# Patient Record
Sex: FEMALE | Race: WHITE | Employment: OTHER | URBAN - METROPOLITAN AREA
[De-identification: names, ages, dates, MRNs, and addresses within clinical notes are randomized per-mention and may not be internally consistent; named-entity substitution may affect disease eponyms.]

---

## 2017-02-15 ENCOUNTER — OFFICE VISIT (OUTPATIENT)
Dept: FAMILY MEDICINE CLINIC | Age: 72
End: 2017-02-15

## 2017-02-15 ENCOUNTER — TELEPHONE (OUTPATIENT)
Dept: FAMILY MEDICINE CLINIC | Age: 72
End: 2017-02-15

## 2017-02-15 VITALS
DIASTOLIC BLOOD PRESSURE: 64 MMHG | WEIGHT: 177.4 LBS | TEMPERATURE: 98.9 F | BODY MASS INDEX: 34.65 KG/M2 | SYSTOLIC BLOOD PRESSURE: 102 MMHG | HEART RATE: 88 BPM | RESPIRATION RATE: 12 BRPM

## 2017-02-15 DIAGNOSIS — I34.1 MVP (MITRAL VALVE PROLAPSE): ICD-10-CM

## 2017-02-15 DIAGNOSIS — I49.9 IRREGULAR HEART RATE: Primary | ICD-10-CM

## 2017-02-15 PROCEDURE — 93000 ELECTROCARDIOGRAM COMPLETE: CPT | Performed by: FAMILY MEDICINE

## 2017-02-15 PROCEDURE — 99214 OFFICE O/P EST MOD 30 MIN: CPT | Performed by: FAMILY MEDICINE

## 2017-03-18 DIAGNOSIS — E78.00 PURE HYPERCHOLESTEROLEMIA: ICD-10-CM

## 2017-03-19 RX ORDER — ATORVASTATIN CALCIUM 20 MG/1
TABLET, FILM COATED ORAL
Qty: 30 TABLET | Refills: 0 | Status: SHIPPED | OUTPATIENT
Start: 2017-03-19 | End: 2017-04-24 | Stop reason: SDUPTHER

## 2017-04-14 ENCOUNTER — OFFICE VISIT (OUTPATIENT)
Dept: FAMILY MEDICINE CLINIC | Age: 72
End: 2017-04-14

## 2017-04-14 VITALS
HEIGHT: 60 IN | OXYGEN SATURATION: 98 % | HEART RATE: 80 BPM | DIASTOLIC BLOOD PRESSURE: 70 MMHG | BODY MASS INDEX: 34.51 KG/M2 | SYSTOLIC BLOOD PRESSURE: 115 MMHG | WEIGHT: 175.8 LBS

## 2017-04-14 DIAGNOSIS — J11.1 INFLUENZA: ICD-10-CM

## 2017-04-14 DIAGNOSIS — E23.0 HYPOPITUITARISM (HCC): Primary | ICD-10-CM

## 2017-04-14 DIAGNOSIS — G25.0 BENIGN ESSENTIAL TREMOR: ICD-10-CM

## 2017-04-14 PROCEDURE — 99214 OFFICE O/P EST MOD 30 MIN: CPT | Performed by: FAMILY MEDICINE

## 2017-04-14 RX ORDER — PROPRANOLOL HCL 60 MG
60 CAPSULE, EXTENDED RELEASE 24HR ORAL DAILY
Qty: 30 CAPSULE | Refills: 5 | Status: SHIPPED | OUTPATIENT
Start: 2017-04-14 | End: 2018-04-16

## 2017-04-14 RX ORDER — LEVOTHYROXINE SODIUM 137 UG/1
125 TABLET ORAL
COMMUNITY
End: 2017-05-05

## 2017-04-14 RX ORDER — ONDANSETRON 4 MG/1
4-8 TABLET, ORALLY DISINTEGRATING ORAL EVERY 8 HOURS PRN
Qty: 20 TABLET | Refills: 2 | Status: SHIPPED | OUTPATIENT
Start: 2017-04-14 | End: 2018-04-09 | Stop reason: SDUPTHER

## 2017-04-14 RX ORDER — BENZONATATE 100 MG/1
100 CAPSULE ORAL
COMMUNITY
Start: 2017-04-10 | End: 2018-04-09

## 2017-04-24 DIAGNOSIS — E78.00 PURE HYPERCHOLESTEROLEMIA: ICD-10-CM

## 2017-04-24 RX ORDER — ATORVASTATIN CALCIUM 20 MG/1
TABLET, FILM COATED ORAL
Qty: 30 TABLET | Refills: 0 | Status: SHIPPED | OUTPATIENT
Start: 2017-04-24 | End: 2017-05-23 | Stop reason: SDUPTHER

## 2017-05-05 ENCOUNTER — HOSPITAL ENCOUNTER (OUTPATIENT)
Dept: OTHER | Age: 72
Discharge: HOME | End: 2017-05-05
Attending: FAMILY MEDICINE | Admitting: FAMILY MEDICINE

## 2017-05-05 ENCOUNTER — OFFICE VISIT (OUTPATIENT)
Dept: FAMILY MEDICINE CLINIC | Age: 72
End: 2017-05-05

## 2017-05-05 VITALS
OXYGEN SATURATION: 98 % | DIASTOLIC BLOOD PRESSURE: 60 MMHG | HEART RATE: 81 BPM | SYSTOLIC BLOOD PRESSURE: 97 MMHG | WEIGHT: 177 LBS | RESPIRATION RATE: 12 BRPM | TEMPERATURE: 98.6 F | BODY MASS INDEX: 34.57 KG/M2

## 2017-05-05 DIAGNOSIS — R09.1 PLEURISY: Primary | ICD-10-CM

## 2017-05-05 DIAGNOSIS — R07.81 RIB PAIN ON RIGHT SIDE: ICD-10-CM

## 2017-05-05 DIAGNOSIS — R09.1 PLEURISY: ICD-10-CM

## 2017-05-05 PROCEDURE — 99213 OFFICE O/P EST LOW 20 MIN: CPT | Performed by: FAMILY MEDICINE

## 2017-05-05 RX ORDER — TRAMADOL HYDROCHLORIDE 50 MG/1
50 TABLET ORAL EVERY 6 HOURS PRN
Qty: 30 TABLET | Refills: 1 | Status: SHIPPED | OUTPATIENT
Start: 2017-05-05 | End: 2018-04-16

## 2017-05-05 RX ORDER — OSELTAMIVIR PHOSPHATE 30 MG/1
CAPSULE ORAL
COMMUNITY
Start: 2017-04-10 | End: 2017-05-05

## 2017-05-05 RX ORDER — LEVOTHYROXINE SODIUM 0.12 MG/1
TABLET ORAL
COMMUNITY
Start: 2017-03-29 | End: 2018-09-06 | Stop reason: ALTCHOICE

## 2017-05-23 DIAGNOSIS — E78.00 PURE HYPERCHOLESTEROLEMIA: ICD-10-CM

## 2017-05-26 RX ORDER — ATORVASTATIN CALCIUM 20 MG/1
TABLET, FILM COATED ORAL
Qty: 30 TABLET | Refills: 0 | Status: SHIPPED | OUTPATIENT
Start: 2017-05-26 | End: 2017-06-27 | Stop reason: SDUPTHER

## 2017-06-27 DIAGNOSIS — E78.00 PURE HYPERCHOLESTEROLEMIA: ICD-10-CM

## 2017-06-27 RX ORDER — ATORVASTATIN CALCIUM 20 MG/1
TABLET, FILM COATED ORAL
Qty: 30 TABLET | Refills: 5 | Status: SHIPPED | OUTPATIENT
Start: 2017-06-27 | End: 2018-02-28 | Stop reason: SDUPTHER

## 2018-04-09 ENCOUNTER — OFFICE VISIT (OUTPATIENT)
Dept: FAMILY MEDICINE CLINIC | Age: 73
End: 2018-04-09

## 2018-04-09 VITALS
RESPIRATION RATE: 16 BRPM | TEMPERATURE: 96.6 F | WEIGHT: 172.4 LBS | BODY MASS INDEX: 33.67 KG/M2 | HEART RATE: 77 BPM | OXYGEN SATURATION: 99 % | SYSTOLIC BLOOD PRESSURE: 120 MMHG | DIASTOLIC BLOOD PRESSURE: 84 MMHG

## 2018-04-09 DIAGNOSIS — E78.00 PURE HYPERCHOLESTEROLEMIA: ICD-10-CM

## 2018-04-09 DIAGNOSIS — E23.0 PANHYPOPITUITARISM (HCC): ICD-10-CM

## 2018-04-09 DIAGNOSIS — J06.9 VIRAL URI: ICD-10-CM

## 2018-04-09 DIAGNOSIS — R73.9 HYPERGLYCEMIA: ICD-10-CM

## 2018-04-09 DIAGNOSIS — L20.82 FLEXURAL ECZEMA: ICD-10-CM

## 2018-04-09 DIAGNOSIS — E03.9 ACQUIRED HYPOTHYROIDISM: ICD-10-CM

## 2018-04-09 DIAGNOSIS — E55.9 VITAMIN D DEFICIENCY: ICD-10-CM

## 2018-04-09 DIAGNOSIS — Z23 NEED FOR SHINGLES VACCINE: ICD-10-CM

## 2018-04-09 DIAGNOSIS — R42 LIGHTHEADEDNESS: Primary | ICD-10-CM

## 2018-04-09 DIAGNOSIS — Z12.31 ENCOUNTER FOR SCREENING MAMMOGRAM FOR BREAST CANCER: ICD-10-CM

## 2018-04-09 DIAGNOSIS — Z11.59 ENCOUNTER FOR HEPATITIS C SCREENING TEST FOR LOW RISK PATIENT: ICD-10-CM

## 2018-04-09 DIAGNOSIS — E27.49 GLUCOCORTICOID DEFICIENCY (HCC): ICD-10-CM

## 2018-04-09 PROCEDURE — 99214 OFFICE O/P EST MOD 30 MIN: CPT | Performed by: FAMILY MEDICINE

## 2018-04-09 PROCEDURE — 93000 ELECTROCARDIOGRAM COMPLETE: CPT | Performed by: FAMILY MEDICINE

## 2018-04-09 RX ORDER — ONDANSETRON 4 MG/1
4-8 TABLET, ORALLY DISINTEGRATING ORAL EVERY 8 HOURS PRN
Qty: 20 TABLET | Refills: 2 | Status: SHIPPED | OUTPATIENT
Start: 2018-04-09 | End: 2019-04-09

## 2018-04-09 RX ORDER — TRIAMCINOLONE ACETONIDE 1 MG/ML
LOTION TOPICAL
Qty: 60 ML | Refills: 1 | Status: SHIPPED | OUTPATIENT
Start: 2018-04-09 | End: 2018-04-16

## 2018-04-09 RX ORDER — ATORVASTATIN CALCIUM 20 MG/1
TABLET, FILM COATED ORAL
Qty: 30 TABLET | Refills: 5 | Status: SHIPPED | OUTPATIENT
Start: 2018-04-09 | End: 2018-11-06 | Stop reason: SDUPTHER

## 2018-04-09 ASSESSMENT — ENCOUNTER SYMPTOMS
SHORTNESS OF BREATH: 0
SORE THROAT: 1
WHEEZING: 0
ABDOMINAL PAIN: 0
CHEST TIGHTNESS: 0
SINUS PRESSURE: 0
RHINORRHEA: 1
SINUS PAIN: 0

## 2018-04-10 DIAGNOSIS — D50.0 IRON DEFICIENCY ANEMIA DUE TO CHRONIC BLOOD LOSS: Primary | ICD-10-CM

## 2018-04-10 DIAGNOSIS — D64.9 ANEMIA, UNSPECIFIED TYPE: ICD-10-CM

## 2018-04-10 DIAGNOSIS — D64.9 ANEMIA, UNSPECIFIED TYPE: Primary | ICD-10-CM

## 2018-04-10 LAB
A/G RATIO: 1.5 (ref 1.1–2.2)
ALBUMIN SERPL-MCNC: 4 G/DL (ref 3.4–5)
ALP BLD-CCNC: 96 U/L (ref 40–129)
ALT SERPL-CCNC: 9 U/L (ref 10–40)
ANION GAP SERPL CALCULATED.3IONS-SCNC: 14 MMOL/L (ref 3–16)
AST SERPL-CCNC: 11 U/L (ref 15–37)
BASOPHILS ABSOLUTE: 0.1 K/UL (ref 0–0.2)
BASOPHILS RELATIVE PERCENT: 0.7 %
BILIRUB SERPL-MCNC: 0.3 MG/DL (ref 0–1)
BUN BLDV-MCNC: 13 MG/DL (ref 7–20)
CALCIUM SERPL-MCNC: 9 MG/DL (ref 8.3–10.6)
CHLORIDE BLD-SCNC: 100 MMOL/L (ref 99–110)
CHOLESTEROL, TOTAL: 154 MG/DL (ref 0–199)
CO2: 24 MMOL/L (ref 21–32)
CREAT SERPL-MCNC: 0.9 MG/DL (ref 0.6–1.2)
EOSINOPHILS ABSOLUTE: 0.2 K/UL (ref 0–0.6)
EOSINOPHILS RELATIVE PERCENT: 1.6 %
FERRITIN: 9.1 NG/ML (ref 15–150)
GFR AFRICAN AMERICAN: >60
GFR NON-AFRICAN AMERICAN: >60
GLOBULIN: 2.7 G/DL
GLUCOSE BLD-MCNC: 123 MG/DL (ref 70–99)
HCT VFR BLD CALC: 30.7 % (ref 36–48)
HCT VFR BLD CALC: 33.4 % (ref 36–48)
HDLC SERPL-MCNC: 58 MG/DL (ref 40–60)
HEMOGLOBIN: 9.9 G/DL (ref 12–16)
HEPATITIS C ANTIBODY INTERPRETATION: NORMAL
IMMATURE RETIC FRACT: 0.46 (ref 0.21–0.37)
IRON SATURATION: 6 % (ref 15–50)
IRON: 25 UG/DL (ref 37–145)
LDL CHOLESTEROL CALCULATED: 61 MG/DL
LYMPHOCYTES ABSOLUTE: 2.2 K/UL (ref 1–5.1)
LYMPHOCYTES RELATIVE PERCENT: 18.3 %
MCH RBC QN AUTO: 23.5 PG (ref 26–34)
MCHC RBC AUTO-ENTMCNC: 32.2 G/DL (ref 31–36)
MCV RBC AUTO: 72.9 FL (ref 80–100)
MONOCYTES ABSOLUTE: 0.8 K/UL (ref 0–1.3)
MONOCYTES RELATIVE PERCENT: 6.8 %
NEUTROPHILS ABSOLUTE: 8.8 K/UL (ref 1.7–7.7)
NEUTROPHILS RELATIVE PERCENT: 72.6 %
PDW BLD-RTO: 19.1 % (ref 12.4–15.4)
PLATELET # BLD: 326 K/UL (ref 135–450)
PMV BLD AUTO: 9.4 FL (ref 5–10.5)
POTASSIUM SERPL-SCNC: 4 MMOL/L (ref 3.5–5.1)
PROLACTIN: 2 NG/ML
RBC # BLD: 4.21 M/UL (ref 4–5.2)
RETICULOCYTE ABSOLUTE COUNT: 0.09 M/UL (ref 0.02–0.1)
RETICULOCYTE COUNT PCT: 1.94 % (ref 0.5–2.18)
SODIUM BLD-SCNC: 138 MMOL/L (ref 136–145)
T4 FREE: 2.2 NG/DL (ref 0.9–1.8)
TOTAL IRON BINDING CAPACITY: 386 UG/DL (ref 260–445)
TOTAL PROTEIN: 6.7 G/DL (ref 6.4–8.2)
TRIGL SERPL-MCNC: 175 MG/DL (ref 0–150)
TSH REFLEX: <0.01 UIU/ML (ref 0.27–4.2)
VITAMIN B-12: 562 PG/ML (ref 211–911)
VITAMIN D 25-HYDROXY: 31.2 NG/ML
VLDLC SERPL CALC-MCNC: 35 MG/DL
WBC # BLD: 12.1 K/UL (ref 4–11)

## 2018-04-10 RX ORDER — LANOLIN ALCOHOL/MO/W.PET/CERES
325 CREAM (GRAM) TOPICAL 2 TIMES DAILY
Qty: 90 TABLET | Refills: 3 | COMMUNITY
Start: 2018-04-10 | End: 2019-07-10

## 2018-04-11 ENCOUNTER — TELEPHONE (OUTPATIENT)
Dept: FAMILY MEDICINE CLINIC | Age: 73
End: 2018-04-11

## 2018-04-11 LAB
ESTIMATED AVERAGE GLUCOSE: 119.8 MG/DL
HBA1C MFR BLD: 5.8 %

## 2018-04-12 LAB — GROWTH HORMONE: 1.28 NG/ML (ref 0.05–8)

## 2018-04-13 LAB — ADRENOCORTICOTROPIC HORMONE: <5 PG/ML (ref 6–58)

## 2018-04-16 ENCOUNTER — HOSPITAL ENCOUNTER (OUTPATIENT)
Dept: MAMMOGRAPHY | Age: 73
Discharge: HOME | End: 2018-04-16
Attending: FAMILY MEDICINE | Admitting: FAMILY MEDICINE

## 2018-04-16 DIAGNOSIS — Z12.31 ENCOUNTER FOR SCREENING MAMMOGRAM FOR BREAST CANCER: ICD-10-CM

## 2018-04-19 LAB — CORTISOL FREE, SERUM: <0.05 UG/DL

## 2018-04-27 ENCOUNTER — HOSPITAL ENCOUNTER (OUTPATIENT)
Dept: ENDOSCOPY | Age: 73
Discharge: HOME | End: 2018-04-27
Attending: INTERNAL MEDICINE | Admitting: INTERNAL MEDICINE

## 2018-04-27 RX ORDER — LIDOCAINE HYDROCHLORIDE 10 MG/ML
1 INJECTION, SOLUTION EPIDURAL; INFILTRATION; INTRACAUDAL; PERINEURAL
Status: DISCONTINUED | OUTPATIENT
Start: 2018-04-27 | End: 2018-04-27

## 2018-04-27 RX ORDER — SODIUM CHLORIDE 9 MG/ML
INJECTION, SOLUTION INTRAVENOUS CONTINUOUS
Status: CANCELLED | OUTPATIENT
Start: 2018-04-27

## 2018-04-27 RX ORDER — SODIUM CHLORIDE 0.9 % (FLUSH) 0.9 %
10 SYRINGE (ML) INJECTION EVERY 12 HOURS SCHEDULED
Status: CANCELLED | OUTPATIENT
Start: 2018-04-27

## 2018-04-27 RX ORDER — SODIUM CHLORIDE 0.9 % (FLUSH) 0.9 %
10 SYRINGE (ML) INJECTION EVERY 12 HOURS SCHEDULED
Status: DISCONTINUED | OUTPATIENT
Start: 2018-04-27 | End: 2018-04-28 | Stop reason: HOSPADM

## 2018-04-27 RX ORDER — LIDOCAINE HYDROCHLORIDE 10 MG/ML
1 INJECTION, SOLUTION EPIDURAL; INFILTRATION; INTRACAUDAL; PERINEURAL
Status: CANCELLED | OUTPATIENT
Start: 2018-04-27 | End: 2018-04-27

## 2018-04-27 RX ORDER — SODIUM CHLORIDE 0.9 % (FLUSH) 0.9 %
10 SYRINGE (ML) INJECTION PRN
Status: DISCONTINUED | OUTPATIENT
Start: 2018-04-27 | End: 2018-04-28 | Stop reason: HOSPADM

## 2018-04-27 RX ORDER — SODIUM CHLORIDE 0.9 % (FLUSH) 0.9 %
10 SYRINGE (ML) INJECTION PRN
Status: CANCELLED | OUTPATIENT
Start: 2018-04-27

## 2018-04-27 RX ORDER — SODIUM CHLORIDE 9 MG/ML
INJECTION, SOLUTION INTRAVENOUS CONTINUOUS
Status: DISCONTINUED | OUTPATIENT
Start: 2018-04-27 | End: 2018-04-28 | Stop reason: HOSPADM

## 2018-04-27 RX ORDER — ONDANSETRON 2 MG/ML
4 INJECTION INTRAMUSCULAR; INTRAVENOUS
Status: ACTIVE | OUTPATIENT
Start: 2018-04-27 | End: 2018-04-27

## 2018-05-09 PROBLEM — Z11.59 ENCOUNTER FOR HEPATITIS C SCREENING TEST FOR LOW RISK PATIENT: Status: RESOLVED | Noted: 2018-04-09 | Resolved: 2018-05-09

## 2018-05-09 PROBLEM — Z12.31 ENCOUNTER FOR SCREENING MAMMOGRAM FOR BREAST CANCER: Status: RESOLVED | Noted: 2018-04-09 | Resolved: 2018-05-09

## 2018-07-13 ENCOUNTER — TELEPHONE (OUTPATIENT)
Dept: FAMILY MEDICINE CLINIC | Age: 73
End: 2018-07-13

## 2018-07-13 DIAGNOSIS — D64.9 ANEMIA, UNSPECIFIED TYPE: Primary | ICD-10-CM

## 2018-07-18 DIAGNOSIS — D64.9 ANEMIA, UNSPECIFIED TYPE: ICD-10-CM

## 2018-07-18 LAB
FERRITIN: 32.1 NG/ML (ref 15–150)
HCT VFR BLD CALC: 42.7 % (ref 36–48)
HEMOGLOBIN: 14.4 G/DL (ref 12–16)
IRON SATURATION: 26 % (ref 15–50)
IRON: 79 UG/DL (ref 37–145)
MCH RBC QN AUTO: 30.2 PG (ref 26–34)
MCHC RBC AUTO-ENTMCNC: 33.8 G/DL (ref 31–36)
MCV RBC AUTO: 89.4 FL (ref 80–100)
PDW BLD-RTO: 17.3 % (ref 12.4–15.4)
PLATELET # BLD: 228 K/UL (ref 135–450)
PMV BLD AUTO: 9.8 FL (ref 5–10.5)
RBC # BLD: 4.77 M/UL (ref 4–5.2)
TOTAL IRON BINDING CAPACITY: 301 UG/DL (ref 260–445)
WBC # BLD: 10.5 K/UL (ref 4–11)

## 2018-09-06 ENCOUNTER — OFFICE VISIT (OUTPATIENT)
Dept: FAMILY MEDICINE CLINIC | Age: 73
End: 2018-09-06

## 2018-09-06 VITALS
OXYGEN SATURATION: 98 % | DIASTOLIC BLOOD PRESSURE: 81 MMHG | SYSTOLIC BLOOD PRESSURE: 134 MMHG | TEMPERATURE: 97.8 F | BODY MASS INDEX: 33.32 KG/M2 | RESPIRATION RATE: 12 BRPM | HEART RATE: 69 BPM | WEIGHT: 170.6 LBS

## 2018-09-06 DIAGNOSIS — R23.8 SKIN IRRITATION: Primary | ICD-10-CM
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
## 2018-09-06 DIAGNOSIS — B35.9 TINEA: ICD-10-CM

## 2018-09-06 DIAGNOSIS — Z23 NEED FOR INFLUENZA VACCINATION: ICD-10-CM

## 2018-09-06 PROCEDURE — 90662 IIV NO PRSV INCREASED AG IM: CPT | Performed by: FAMILY MEDICINE

## 2018-09-06 PROCEDURE — G0008 ADMIN INFLUENZA VIRUS VAC: HCPCS | Performed by: FAMILY MEDICINE

## 2018-09-06 PROCEDURE — 99213 OFFICE O/P EST LOW 20 MIN: CPT | Performed by: FAMILY MEDICINE

## 2018-09-06 RX ORDER — LEVOTHYROXINE SODIUM 0.1 MG/1
TABLET ORAL
COMMUNITY
Start: 2018-08-29 | End: 2021-12-15 | Stop reason: SDUPTHER

## 2018-09-06 RX ORDER — TRIAMCINOLONE ACETONIDE 1 MG/G
CREAM TOPICAL
Qty: 28.4 G | Refills: 0 | Status: SHIPPED | OUTPATIENT
Start: 2018-09-06 | End: 2019-07-10

## 2018-09-06 RX ORDER — NYSTATIN 100000 [USP'U]/G
POWDER TOPICAL
Qty: 56.7 G | Refills: 2 | Status: SHIPPED | OUTPATIENT
Start: 2018-09-06 | End: 2020-08-03 | Stop reason: ALTCHOICE

## 2018-09-06 ASSESSMENT — PATIENT HEALTH QUESTIONNAIRE - PHQ9
SUM OF ALL RESPONSES TO PHQ QUESTIONS 1-9: 0
SUM OF ALL RESPONSES TO PHQ QUESTIONS 1-9: 0
2. FEELING DOWN, DEPRESSED OR HOPELESS: 0
1. LITTLE INTEREST OR PLEASURE IN DOING THINGS: 0
SUM OF ALL RESPONSES TO PHQ9 QUESTIONS 1 & 2: 0

## 2018-09-06 NOTE — PROGRESS NOTES
Vaccine Information Sheet, \"Influenza - Inactivated\"  given to Sekou Mallory, or parent/legal guardian of  Sekou Mallory and verbalized understanding. Patient responses:    Have you ever had a reaction to a flu vaccine? No  Are you able to eat eggs without adverse effects? Yes  Do you have any current illness? No  Have you ever had Guillian Upton Syndrome? No    Flu vaccine given per order. Please see immunization tab. Current Influenza vaccine VIS given to patient. Influenza consent form/questionnaire completed and signed. Patient responses to  Influenza consent form / questionnaire  reviewed. Vaccine given per protocol.
Restless legs syndrome (RLS)     Wears glasses        Past Surgical History:   Procedure Laterality Date    CARPAL TUNNEL RELEASE Bilateral     CHOLECYSTECTOMY  05/13/2013    DIAGNOSTIC LAPAROSCOPY,LIGATION OF RIGHT ROUND    JOINT REPLACEMENT Bilateral     2006 & 2007    PITUITARY EXCISION  1990 5/6 removed - Dr Abby Juan        Family History   Problem Relation Age of Onset    No Known Problems Mother     No Known Problems Father     No Known Problems Maternal Grandmother     No Known Problems Maternal Grandfather     No Known Problems Paternal Grandmother     No Known Problems Paternal Grandfather     No Known Problems Sister     No Known Problems Brother     No Known Problems Paternal Aunt     No Known Problems Paternal Uncle     No Known Problems Maternal Aunt     No Known Problems Maternal Uncle     No Known Problems Other     Anesth Problems Neg Hx     Malig Hyperten Neg Hx     Hypotension Neg Hx     Malig Hypertherm Neg Hx     Pseudochol. Deficiency Neg Hx        Social History   Substance Use Topics    Smoking status: Never Smoker    Smokeless tobacco: Never Used    Alcohol use No            Review of Systems  Review of Systems    Objective:   Physical Exam  Vitals:    09/06/18 1003   BP: 134/81   Pulse: 69   Resp: 12   Temp: 97.8 °F (36.6 °C)   SpO2: 98%   Weight: 170 lb 9.6 oz (77.4 kg)       Physical Exam   Constitutional: She is oriented to person, place, and time. She appears well-developed and well-nourished. No distress. Cardiovascular: Normal rate, regular rhythm and normal heart sounds. Pulmonary/Chest: Effort normal and breath sounds normal.   Neurological: She is alert and oriented to person, place, and time. Skin: Skin is warm and dry. Assessment:       Diagnosis Orders   1. Skin irritation  triamcinolone (KENALOG) 0.1 % cream   2. Insect bite, initial encounter  triamcinolone (KENALOG) 0.1 % cream   3.  Tinea  nystatin (MYCOSTATIN) 531982 UNIT/GM

## 2019-02-11 ENCOUNTER — HOSPITAL ENCOUNTER (OUTPATIENT)
Age: 74
Discharge: HOME | End: 2019-02-11
Payer: MEDICARE

## 2019-02-11 ENCOUNTER — OFFICE VISIT (OUTPATIENT)
Dept: FAMILY MEDICINE CLINIC | Age: 74
End: 2019-02-11
Payer: MEDICARE

## 2019-02-11 ENCOUNTER — HOSPITAL ENCOUNTER (OUTPATIENT)
Dept: GENERAL RADIOLOGY | Age: 74
Discharge: HOME | End: 2019-02-11
Payer: MEDICARE

## 2019-02-11 VITALS
SYSTOLIC BLOOD PRESSURE: 132 MMHG | WEIGHT: 164.4 LBS | TEMPERATURE: 98.1 F | HEIGHT: 60 IN | HEART RATE: 74 BPM | OXYGEN SATURATION: 98 % | DIASTOLIC BLOOD PRESSURE: 82 MMHG | BODY MASS INDEX: 32.28 KG/M2

## 2019-02-11 DIAGNOSIS — M79.644 FINGER PAIN, RIGHT: ICD-10-CM

## 2019-02-11 DIAGNOSIS — E23.0 HYPOPITUITARISM (HCC): ICD-10-CM

## 2019-02-11 DIAGNOSIS — M79.644 FINGER PAIN, RIGHT: Primary | ICD-10-CM

## 2019-02-11 PROCEDURE — 99213 OFFICE O/P EST LOW 20 MIN: CPT | Performed by: FAMILY MEDICINE

## 2019-02-11 PROCEDURE — 73140 X-RAY EXAM OF FINGER(S): CPT

## 2019-02-19 ENCOUNTER — TELEPHONE (OUTPATIENT)
Dept: FAMILY MEDICINE CLINIC | Age: 74
End: 2019-02-19

## 2019-02-19 ENCOUNTER — OFFICE VISIT (OUTPATIENT)
Dept: FAMILY MEDICINE CLINIC | Age: 74
End: 2019-02-19
Payer: MEDICARE

## 2019-02-19 VITALS
SYSTOLIC BLOOD PRESSURE: 136 MMHG | RESPIRATION RATE: 12 BRPM | WEIGHT: 162.8 LBS | OXYGEN SATURATION: 98 % | HEART RATE: 77 BPM | DIASTOLIC BLOOD PRESSURE: 78 MMHG | BODY MASS INDEX: 31.79 KG/M2

## 2019-02-19 DIAGNOSIS — S60.041A CONTUSION OF RIGHT RING FINGER WITHOUT DAMAGE TO NAIL, INITIAL ENCOUNTER: ICD-10-CM

## 2019-02-19 DIAGNOSIS — M81.0 AGE-RELATED OSTEOPOROSIS WITHOUT CURRENT PATHOLOGICAL FRACTURE: ICD-10-CM

## 2019-02-19 DIAGNOSIS — F32.9 REACTIVE DEPRESSION (SITUATIONAL): ICD-10-CM

## 2019-02-19 DIAGNOSIS — D50.0 IRON DEFICIENCY ANEMIA DUE TO CHRONIC BLOOD LOSS: ICD-10-CM

## 2019-02-19 DIAGNOSIS — E23.0 PANHYPOPITUITARISM (HCC): ICD-10-CM

## 2019-02-19 DIAGNOSIS — M19.041 OSTEOARTHRITIS OF FINGER OF RIGHT HAND: Primary | ICD-10-CM

## 2019-02-19 PROCEDURE — 99214 OFFICE O/P EST MOD 30 MIN: CPT | Performed by: FAMILY MEDICINE

## 2019-02-19 RX ORDER — ZOLEDRONIC ACID 5 MG/100ML
5 INJECTION, SOLUTION INTRAVENOUS ONCE
Qty: 100 ML | Refills: 0 | Status: SHIPPED | OUTPATIENT
Start: 2019-02-19 | End: 2019-03-25 | Stop reason: SDUPTHER

## 2019-02-19 RX ORDER — OMEPRAZOLE 40 MG/1
40 CAPSULE, DELAYED RELEASE ORAL DAILY
Qty: 90 CAPSULE | Refills: 3 | Status: SHIPPED | OUTPATIENT
Start: 2019-02-19 | End: 2020-02-13

## 2019-02-19 ASSESSMENT — PATIENT HEALTH QUESTIONNAIRE - PHQ9
SUM OF ALL RESPONSES TO PHQ QUESTIONS 1-9: 0
SUM OF ALL RESPONSES TO PHQ QUESTIONS 1-9: 0
2. FEELING DOWN, DEPRESSED OR HOPELESS: 0
SUM OF ALL RESPONSES TO PHQ9 QUESTIONS 1 & 2: 0
1. LITTLE INTEREST OR PLEASURE IN DOING THINGS: 0

## 2019-02-19 ASSESSMENT — ENCOUNTER SYMPTOMS
SHORTNESS OF BREATH: 0
COUGH: 0

## 2019-03-25 ENCOUNTER — TELEPHONE (OUTPATIENT)
Dept: FAMILY MEDICINE CLINIC | Age: 74
End: 2019-03-25

## 2019-03-25 DIAGNOSIS — M81.0 AGE-RELATED OSTEOPOROSIS WITHOUT CURRENT PATHOLOGICAL FRACTURE: ICD-10-CM

## 2019-03-25 RX ORDER — ZOLEDRONIC ACID 5 MG/100ML
5 INJECTION, SOLUTION INTRAVENOUS ONCE
Qty: 100 ML | Refills: 0 | Status: SHIPPED | OUTPATIENT
Start: 2019-03-25 | End: 2020-08-10

## 2019-03-28 ENCOUNTER — HOSPITAL ENCOUNTER (OUTPATIENT)
Dept: ONCOLOGY | Age: 74
Setting detail: INFUSION SERIES
Discharge: HOME | End: 2019-03-28
Payer: MEDICARE

## 2019-03-28 VITALS
DIASTOLIC BLOOD PRESSURE: 73 MMHG | HEART RATE: 74 BPM | TEMPERATURE: 98.4 F | RESPIRATION RATE: 16 BRPM | SYSTOLIC BLOOD PRESSURE: 107 MMHG

## 2019-03-28 PROCEDURE — 96365 THER/PROPH/DIAG IV INF INIT: CPT

## 2019-03-28 PROCEDURE — 6360000002 HC RX W HCPCS: Performed by: FAMILY MEDICINE

## 2019-03-28 PROCEDURE — 99201 HC NEW PT, OUTPT VISIT LEVEL 1: CPT

## 2019-03-28 RX ORDER — ZOLEDRONIC ACID 5 MG/100ML
5 INJECTION, SOLUTION INTRAVENOUS ONCE
Status: COMPLETED | OUTPATIENT
Start: 2019-03-28 | End: 2019-03-28

## 2019-03-28 RX ADMIN — ZOLEDRONIC ACID 5 MG: 5 INJECTION, SOLUTION INTRAVENOUS at 13:08

## 2019-03-28 NOTE — PLAN OF CARE
Problem: KNOWLEDGE DEFICIT  Goal: Patient/S.O. demonstrates understanding of disease process, treatment plan, medications, and discharge instructions. Note:   Pt seen and assessed at [address] [facility] for Reclast infusion per orders from Dr. Nata Wolfe. Infused per [facility] policy. Monitoring completed for infusion reactions - see flowsheet. Pt tolerated infusion well and without incident. Pt verbalizes understanding of discharge instructions. Discharged ambulatory to home with self.

## 2019-03-28 NOTE — PROGRESS NOTES
Patient arrived ambulatory to Outpatient infusion for Reclast.  Alert, oriented, vss.  Denies complaints.

## 2019-04-29 ENCOUNTER — HOSPITAL ENCOUNTER (OUTPATIENT)
Dept: MAMMOGRAPHY | Age: 74
Discharge: HOME | End: 2019-04-29
Payer: MEDICARE

## 2019-04-29 DIAGNOSIS — Z12.31 VISIT FOR SCREENING MAMMOGRAM: ICD-10-CM

## 2019-04-29 PROCEDURE — 77063 BREAST TOMOSYNTHESIS BI: CPT

## 2019-07-01 LAB
AVERAGE GLUCOSE: 111
HBA1C MFR BLD: 5.5 %

## 2019-07-10 ENCOUNTER — OFFICE VISIT (OUTPATIENT)
Dept: FAMILY MEDICINE CLINIC | Age: 74
End: 2019-07-10
Payer: MEDICARE

## 2019-07-10 VITALS
RESPIRATION RATE: 12 BRPM | BODY MASS INDEX: 30.82 KG/M2 | OXYGEN SATURATION: 96 % | WEIGHT: 157 LBS | SYSTOLIC BLOOD PRESSURE: 98 MMHG | TEMPERATURE: 97.7 F | HEIGHT: 60 IN | HEART RATE: 69 BPM | DIASTOLIC BLOOD PRESSURE: 67 MMHG

## 2019-07-10 DIAGNOSIS — D72.829 LEUKOCYTOSIS, UNSPECIFIED TYPE: Primary | ICD-10-CM

## 2019-07-10 DIAGNOSIS — D72.829 LEUKOCYTOSIS, UNSPECIFIED TYPE: ICD-10-CM

## 2019-07-10 LAB
BASOPHILS ABSOLUTE: 0 K/UL (ref 0–0.2)
BASOPHILS RELATIVE PERCENT: 0.4 %
EOSINOPHILS ABSOLUTE: 0.1 K/UL (ref 0–0.6)
EOSINOPHILS RELATIVE PERCENT: 0.5 %
HCT VFR BLD CALC: 45.2 % (ref 36–48)
HEMOGLOBIN: 14.7 G/DL (ref 12–16)
LYMPHOCYTES ABSOLUTE: 1.4 K/UL (ref 1–5.1)
LYMPHOCYTES RELATIVE PERCENT: 13.6 %
MCH RBC QN AUTO: 30.3 PG (ref 26–34)
MCHC RBC AUTO-ENTMCNC: 32.6 G/DL (ref 31–36)
MCV RBC AUTO: 93.1 FL (ref 80–100)
MONOCYTES ABSOLUTE: 0.5 K/UL (ref 0–1.3)
MONOCYTES RELATIVE PERCENT: 5.1 %
NEUTROPHILS ABSOLUTE: 8.1 K/UL (ref 1.7–7.7)
NEUTROPHILS RELATIVE PERCENT: 80.4 %
PDW BLD-RTO: 14.5 % (ref 12.4–15.4)
PLATELET # BLD: 247 K/UL (ref 135–450)
PMV BLD AUTO: 10.2 FL (ref 5–10.5)
RBC # BLD: 4.86 M/UL (ref 4–5.2)
WBC # BLD: 10.1 K/UL (ref 4–11)

## 2019-07-10 PROCEDURE — 99213 OFFICE O/P EST LOW 20 MIN: CPT | Performed by: FAMILY MEDICINE

## 2019-07-10 NOTE — PROGRESS NOTES
Subjective:      Patient ID: Madeleine Alves 69 y.o. female. is here for evaluation for elevated WBC      HPI    Maggy Mckeon was referred by Dr. Kaci Coffey. Routine labs showed increased WBC. She complains of some fatigue. She denies fever, night sweats, weight loss, URI, ST, cough, congestion, dysuria. She has been doing more the past few weeks; her partner recently had TKR. She is sleeping well. Outpatient Medications Marked as Taking for the 7/10/19 encounter (Office Visit) with Mariia Garcias MD   Medication Sig Dispense Refill    atorvastatin (LIPITOR) 20 MG tablet TAKE ONE TABLET BY MOUTH DAILY 30 tablet 2    omeprazole (PRILOSEC) 40 MG delayed release capsule Take 1 capsule by mouth daily 90 capsule 3    diclofenac sodium (VOLTAREN) 1 % GEL Apply 2 g topically 4 times daily 2 Tube 0    levothyroxine (SYNTHROID) 100 MCG tablet       nystatin (MYCOSTATIN) 390948 UNIT/GM powder Apply 3 times daily.  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[address]    Glucocorticoid deficiency ([address]    Vitamin D deficiency    Osteopenia    Nausea & vomiting    Hiatal hernia    Elevated liver enzymes    Need for shingles vaccine    Anemia       Past Medical History:   Diagnosis Date    Anxiety     Depression     GERD (gastroesophageal reflux disease)     Hypothyroidism     Osteoporosis     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    Restless legs syndrome (RLS)     Wears glasses        Past Surgical History:   Procedure Laterality Date    CARPAL TUNNEL RELEASE Bilateral     CHOLECYSTECTOMY  05/13/2013    DIAGNOSTIC LAPAROSCOPY,LIGATION OF RIGHT ROUND    JOINT REPLACEMENT Bilateral     2006 & 2007    PITUITARY EXCISION  1990 5/6 removed - Dr Cristobal Tirado        Family History   Problem Relation Age of Onset    No Known Problems Mother     No Known Problems Father     No Known Problems Maternal Grandmother     No Known Problems Maternal Grandfather     No Known Problems Paternal Grandmother     No Known Problems Paternal Grandfather     No Known Problems Sister     No Known Problems Brother     No Known Problems Paternal Aunt     No Known Problems Paternal Uncle     No Known Problems Maternal Aunt     No Known Problems Maternal Uncle     No Known Problems Other     Anesth Problems Neg Hx     Malig Hyperten Neg Hx     Hypotension Neg Hx     Malig Hypertherm Neg Hx     Pseudochol. Deficiency Neg Hx        Social History     Tobacco Use    Smoking status: Never Smoker    Smokeless tobacco: Never Used   Substance Use Topics    Alcohol use: No    Drug use: No            Review of Systems  Review of Systems    Objective:   Physical Exam  Vitals:    07/10/19 1424   BP: 98/67   Pulse: 69   Resp: 12   Temp: 97.7 °F (36.5 °C)   TempSrc: Oral   SpO2: 96%   Weight: 157 lb (71.2 kg)   Height: 5' (1.524 m)       Physical Exam  NAD  Skin is warm and dry. Well hydrated, no rash. No cervical, supraclavicular or submandibular LNS. Chest is clear, no wheezing or rales. Normal symmetric air entry throughout both lung fields. Heart regular with normal rate, no murmer or gallop  The abdomen is soft without tenderness, guarding, mass, rebound or organomegaly. Bowel sounds are normal. No CVA inguinal adenopathy noted. Assessment:       Diagnosis Orders   1. Leukocytosis, unspecified type  CBC Auto Differential          Plan:      Reassured this is likely not significant.  Call or return to clinic prn if these symptoms worsen or fail to improve as

## 2019-08-19 DIAGNOSIS — E78.00 PURE HYPERCHOLESTEROLEMIA: ICD-10-CM

## 2019-08-19 RX ORDER — ATORVASTATIN CALCIUM 20 MG/1
TABLET, FILM COATED ORAL
Qty: 90 TABLET | Refills: 1 | Status: SHIPPED | OUTPATIENT
Start: 2019-08-19 | End: 2020-02-13

## 2020-02-07 ENCOUNTER — NURSE TRIAGE (OUTPATIENT)
Dept: OTHER | Facility: CLINIC | Age: 75
End: 2020-02-07

## 2020-02-13 RX ORDER — ATORVASTATIN CALCIUM 20 MG/1
TABLET, FILM COATED ORAL
Qty: 90 TABLET | Refills: 0 | Status: SHIPPED | OUTPATIENT
Start: 2020-02-13 | End: 2020-05-13

## 2020-02-13 RX ORDER — OMEPRAZOLE 40 MG/1
CAPSULE, DELAYED RELEASE ORAL
Qty: 90 CAPSULE | Refills: 0 | Status: SHIPPED | OUTPATIENT
Start: 2020-02-13 | End: 2020-05-13

## 2020-02-14 ENCOUNTER — OFFICE VISIT (OUTPATIENT)
Dept: FAMILY MEDICINE CLINIC | Age: 75
End: 2020-02-14
Payer: MEDICARE

## 2020-02-14 VITALS
WEIGHT: 165 LBS | TEMPERATURE: 97.1 F | HEIGHT: 61 IN | HEART RATE: 69 BPM | SYSTOLIC BLOOD PRESSURE: 132 MMHG | BODY MASS INDEX: 31.15 KG/M2 | DIASTOLIC BLOOD PRESSURE: 81 MMHG | OXYGEN SATURATION: 99 % | RESPIRATION RATE: 12 BRPM

## 2020-02-14 LAB
BILIRUBIN, POC: NEGATIVE
BLOOD URINE, POC: ABNORMAL
CLARITY, POC: CLEAR
COLOR, POC: CLEAR
GLUCOSE URINE, POC: NEGATIVE
KETONES, POC: NEGATIVE
LEUKOCYTE EST, POC: ABNORMAL
NITRITE, POC: NEGATIVE
PH, POC: 6
PROTEIN, POC: NEGATIVE
SPECIFIC GRAVITY, POC: 1
UROBILINOGEN, POC: 0.2

## 2020-02-14 PROCEDURE — 81002 URINALYSIS NONAUTO W/O SCOPE: CPT | Performed by: FAMILY MEDICINE

## 2020-02-14 PROCEDURE — 1111F DSCHRG MED/CURRENT MED MERGE: CPT | Performed by: FAMILY MEDICINE

## 2020-02-14 PROCEDURE — 99215 OFFICE O/P EST HI 40 MIN: CPT | Performed by: FAMILY MEDICINE

## 2020-02-14 NOTE — PROGRESS NOTES
On the basis of positive falls risk screening, assessment and plan is as follows: home safety tips provided. Post-Discharge Transitional Care Management Services or Hospital Follow Up      Ernestine Gamez   YOB: 1945    Date of Office Visit:  2/14/2020  Date of Hospital Admission: 6/12/15  Date of Hospital Discharge: 6/12/15  Readmission Risk Score(high >=14%. Medium >=10%):No data recorded    Care management risk score Rising risk (score 2-5) and Complex Care (Scores >=6): 0     Non face to face  following discharge, date last encounter closed (first attempt may have been earlier): *No documented post hospital discharge outreach found in the last 14 days *No documented post hospital discharge outreach found in the last 14 days    Call initiated 2 business days of discharge: *No response recorded in the last 14 days     Patient Active Problem List   Diagnosis    Hypopituitarism ([address]    Hyperlipemia    Osteoporosis    Reactive depression (situational)    Acromegaly and gigantism ([address]    Hypothyroidism    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[address]    Glucocorticoid deficiency ([address]    Vitamin D deficiency    Osteopenia    Nausea & vomiting    Hiatal hernia    Elevated liver enzymes    Need for shingles vaccine    Anemia       Allergies   Allergen Reactions    Sulfa Antibiotics      anaphylaxis    Codeine      vomiting    Demerol      nausea    Nsaids Dermatitis       Medications listed as ordered at the time of discharge from hospital   Nick, [facility] [place] [employer] Medication Instructions JESENIA:    Printed on:02/14/20 6105   Medication Information                      atorvastatin (LIPITOR) 20 MG tablet  TAKE ONE TABLET BY MOUTH DAILY             CALCIUM PO  Take by mouth             diclofenac sodium (VOLTAREN) 1 % GEL  Apply 2 g topically 4 times daily             levothyroxine (SYNTHROID) 100 MCG tablet               nystatin (MYCOSTATIN) 174930 UNIT/GM powder  Apply 3 times daily.

## 2020-02-17 ENCOUNTER — TELEPHONE (OUTPATIENT)
Dept: FAMILY MEDICINE CLINIC | Age: 75
End: 2020-02-17

## 2020-02-17 RX ORDER — CEFUROXIME AXETIL 500 MG/1
500 TABLET ORAL 2 TIMES DAILY
Qty: 10 TABLET | Refills: 0 | Status: SHIPPED | OUTPATIENT
Start: 2020-02-17 | End: 2020-02-22

## 2020-02-17 NOTE — TELEPHONE ENCOUNTER
Patient said she finished medication on 2-14-20 when she saw you and now symptoms of UTI have returned. She c/o a little burning and urinary urgency, upset stomach and diarrhea. Please advise if something can be called in to pharmacy. Thanks.

## 2020-02-17 NOTE — TELEPHONE ENCOUNTER
Pt said all of her symtoms are back from the UTI. She still has urgency to urinate,and upset stomach.  [facility] [address] [facility], [address] [facility] - P [phone] - f [phone]

## 2020-05-13 RX ORDER — ATORVASTATIN CALCIUM 20 MG/1
TABLET, FILM COATED ORAL
Qty: 90 TABLET | Refills: 0 | Status: SHIPPED | OUTPATIENT
Start: 2020-05-13 | End: 2020-08-10 | Stop reason: SDUPTHER

## 2020-05-13 RX ORDER — OMEPRAZOLE 40 MG/1
CAPSULE, DELAYED RELEASE ORAL
Qty: 90 CAPSULE | Refills: 0 | Status: SHIPPED | OUTPATIENT
Start: 2020-05-13 | End: 2020-08-10 | Stop reason: SDUPTHER

## 2020-07-27 ENCOUNTER — PATIENT MESSAGE (OUTPATIENT)
Dept: FAMILY MEDICINE CLINIC | Age: 75
End: 2020-07-27

## 2020-07-27 NOTE — TELEPHONE ENCOUNTER
Message from Regine Munguia was vomiting every 10-15 minutes from 7pm [id number]. Took several -4 ?- Zofran tablets & 2 old left over phenergran suppositories. She is sleeping now 7:45am. Did not get her temp but will when awake. I have had intermittent diarrhea for at least 2 weeks, headache. Both weak & fatigued. My temp is 100. Is there a norovirus circulating now? Can you call in new phenergran suppositories for [facility] Or? Or something? We've been staying home and very careful-but-a couple of old friends from the [place] dropped in 14 days ago- trumpers fresh from [place]- took their masks off! I should have been rude. Do you have any suggestions? Hope you and your family are all safe & well!   Thank you,  Nayla Huang & for Cecili[facility]

## 2020-07-31 ENCOUNTER — TELEPHONE (OUTPATIENT)
Dept: FAMILY MEDICINE CLINIC | Age: 75
End: 2020-07-31

## 2020-07-31 NOTE — TELEPHONE ENCOUNTER
Patient is calling today to ask for a general surgeon. Marlene Argueta is the surgeon who she is scheduled with for her hernia repair and does not want this surgeon.      Please advise    [phone] (home)

## 2020-08-03 ENCOUNTER — OFFICE VISIT (OUTPATIENT)
Dept: SURGERY | Age: 75
End: 2020-08-03
Payer: MEDICARE

## 2020-08-03 VITALS
BODY MASS INDEX: 29.53 KG/M2 | HEART RATE: 84 BPM | HEIGHT: 61 IN | SYSTOLIC BLOOD PRESSURE: 136 MMHG | WEIGHT: 156.4 LBS | TEMPERATURE: 97.1 F | DIASTOLIC BLOOD PRESSURE: 72 MMHG

## 2020-08-03 PROCEDURE — 99203 OFFICE O/P NEW LOW 30 MIN: CPT | Performed by: SURGERY

## 2020-08-03 NOTE — PROGRESS NOTES
PATIENT NAME: Barbara Romero     YOB: 1945     TODAY'S DATE: 8/3/2020    Reason for Visit:  Umbilical hernia    Requesting Physician:  Dr. Franki Styles:              The patient is a 77 y.o. female who presents with   Chief Complaint   Patient presents with    New Patient     umbilical hernia, Ref: Dr. Ghislaine Ceballos    Ms. Perfecto Rivera is a 77year old female with past medical history significant for anxiety, depression, GERD, hypothyroidism, osteoporosis, and pituitary abnormality who presents with hernia at the umbilicus. She was recently admitted to [facility] for incarcerated umbilical hernia on 2/80/6047. CT scan was obtained which showed incarcerated umbilical hernia resulting in small bowel obstruction with evidence of strangulation. The hernia was reduced in the emergency department and the patient was discharged the following day. She states she still does have some abdominal pain. She was having issues with constipation, however she had a normal bowel movement today. She denies any nausea or vomiting.  She has a history of a laparoscopic cholecystectomy in the past.     REVIEW OF SYSTEMS:  CONSTITUTIONAL:  negative  HEENT:  negative  RESPIRATORY:  negative  CARDIOVASCULAR:  negative  GASTROINTESTINAL:  negative except for constipation and abdominal pain  GENITOURINARY:  negative  HEMATOLOGIC/LYMPHATIC:  negative  NEUROLOGICAL:  negative    PHYSICAL EXAM:  VITALS:  /72   Pulse 84   Temp 97.1 °F (36.2 °C)   Ht 5' 1\" (1.549 m)   Wt 156 lb 6.4 oz (70.9 kg)   BMI 29.55 kg/m²     CONSTITUTIONAL:  alert, no apparent distress   EYES:  sclera clear  ENT:  normocepalic, without obvious abnormality  NECK:  supple, symmetrical, trachea midline   LUNGS:  Symmetric chest rise, normal effort, on RA  CARDIOVASCULAR:  regular rate and rhythm  ABDOMEN:  scars noted laparoscopic including umbilical, soft, non-distended, minimally tender in umbilical region, voluntary guarding absent, no masses palpated and hernia present at previous incision at the umbilicus   MUSCULOSKELETAL:  0+ pitting edema lower extremities  NEUROLOGIC:  Mental Status Exam:  Level of Alertness:   awake  Orientation:   person, place, time  SKIN:  normal skin color, texture, turgor    Radiology Review:    CT abdomen pelvis 7/28/2020: Incarcerated umbilical hernia resulting in small bowel obstruction with some evidence of    strangulation.         Severe diverticulosis      IMPRESSION/RECOMMENDATIONS:    Patient with recent episode of incarceration of incisional hernia at the umbilicus requiring reduction and admission to the hospital. She has been recovering well. Bowel movements have returned to normal. Due to recent episode of incarceration would recommend incisional hernia repair. Risks, benefits, and alternatives were discussed with the patient and she is in agreement. She did state that she sometimes gets steroids when undergoing surgery so will have her see her endocrinologist prior to surgery for further recommendations. [facility]  8/3/20  8:07 PM  [phone]    I have seen, examined, and reviewed the patients chart. I agree with the residents assessment and have made appropriate changes.     Jessica Marcial

## 2020-08-03 NOTE — LETTER
[facility] of [address] [phone]   [phone]                                                                                                      Kathyleen Leyden, MD FACS                                        SURGERY ORDER   -- Time of order -- 8/3/20    5:27 PM    Facility:   [facility] # _________________                                  Scheduled by: ____________    Surgery Date & Time: 20 2:30pm                      Pt arrival:  12:30pm    Patient Name:  Iona Yun     :  1945 PCP:  Ilsa Lucero MD       Home Ph:    [phone] (home)                                                     PROCEDURE:  Open repair of incisional hernia with mesh    DIAGNOSIS:      ICD-10-CM    1.  Incisional hernia, without obstruction or gangrene  K43.2        Anesthesia: _General  Time Needed:  1 hour   Pt Position:  supine         Outpatient __x__ Admit ______  Assist._____  Pre-Op H & P to be done by:PCP     Labs Needed:   CBC ___  PT/PTT___ INR ____  CMP ___ EKG ___   Urine Hcg ___    Cardiac Clearance ___  (if Xd  to be done by) __________________    Patient to meet with Anesthesiology prior to surgery ___no__     Medications to be stopped 5 days before surgery:none   Additional / Special Orders:     **Ancef 2 gm IV OCTOR   (If patient weight is > 120 kg, give 3 gm IV OCTOR)

## 2020-08-03 NOTE — LETTER
Surgery Schedule Request Form  [facility]  [address] [place] [address]. [address]    DATE OF SURGERY: 8/11/20      TIME OF SURGERY: Case #3       Confirmation#:__________________        Surgeon &Procedure Information:    Surgeon Name: Hansonbryant Starks     Phone: [phone]         Fax: [phone]  Co-Case Additional Surgeon: None    Procedure Name: Robotic Incisional hernia repair    CPT CODES (required for scheduling): [id number]  DIAGNOSIS:    Diagnosis Orders   1. Incisional hernia, without obstruction or gangrene     K43.2    LENGTH OF PROCEDURE: 60 min   Patient Status: Outpatient            PATIENT NAME: Joann Romero               YOB: 1945  SEX: female     SS #:   PHONE: [phone] (home)     INSURANCE: [employer]    INSURANCE PHONE:                                              SUBSCRIBER NAME: Self  MEMBER ID: Eglon Party #:     PCP: Elvira Starkey MD                                      WEIGHT: 156 lbs    ANESTHESIA:  Darryl Alaniz, [address] [place] [address] TO: [phone]   QUESTIONS?  CALL: [phone]

## 2020-08-03 NOTE — LETTER
[address] [facility] General Surgery  [address] [address] [facility]. [address] [facility] [facility] [address] [facility]  [phone], fax:  [phone]    Sincere Sotelo M.D. Instructions for Outpatient Surgery or 23 hour Observation    Patient Name: [id number]:    [facility] of [address] [place], [place]    Date of Surgery 08/25/20  Time to arrive 12:30pm at the main entrance    [address] [place] [address] in the [facility] - VOLUNTEER GABRIEL: will need to call and schedule      PLEASE BE CERTAIN TO FOLLOW THESE INSTRUCTIONS CAREFULLY BEFORE SURGERY    1. NO ASPIRIN OR VITAMIN E FOR FIVE (5) DAYS PRIOR TO SURGERY  2. SHOWER / [address] WITH HIBICLENS SOAP THE NIGHT BEFORE AND THE MORNING OF SURGERY. (Available over the counter at any pharmacy)  3. Do NOT eat or drink anything after midnight the evening before surgery. Food or drink intake of any kind will result in the cancellation of surgery. 4. Be certain to report to your physician any changes in your physical condition. 5. A pre-operative exam MUST be performed by your primary medical doctor. This must be within 30 days prior to your surgery date. 6. Covid testing must be performed at [facility] in the white tent outside the ER on 08/19/20 between 8am-11am.     AFTER SURGERY  1. A responsible adult is REQUIRED to accompany you to the hospital and remain there for your surgery. If this arrangement has not been made at the time of your surgery, your surgery may be cancelled  2. You should not be left alone for 24  48 hours following surgery. 3. You should not drive, sign any important documents or make any important decisions until you have fully recovered from anesthesia (approximately 24  48 hours) AND are off all narcotic pain medications. · Any paperwork needing completion for either your employer or insurance company can be faxed, mailed or dropped off at our office to my attention. Please allow 7 business days for the paperwork to be completed. You will be contacted once it has been completed at which time you will be able to pick it up or have it mailed. · NOTE: Due to HIPPA policy, completed paperwork can not be faxed and per [facility] of 2-1-16, Fees for form completion may apply. If you have any questions, please feel free to call me at the number above.

## 2020-08-03 NOTE — LETTER
General Surgery  Dr. Hal Castleman  [address] [place] [address]  Phone  [phone]    Please present this paper to the staff upon your pre operative exam.       Patient's name:Lexi Romero       : 1945    Dear Kane Sloan Provider: The following procedure has been scheduled and the patient will be required to have a preoperative exam within 30 days of surgery. Please order any additional testing based upon your patient's health history you feel is necessary in order to give medical clearance for surgery. If you have any questions, please feel free to call our office. Hospital: [facility].     Date of procedure: 20    Procedure: Robotic Incisional hernia repair    Type of anesthesia used: General          Once medical clearance has been given, please  fax it to our office at [phone], if outside of the [facility]. If a [facility] provider, please state whether the patient is cleared or not in your office notes for your patient in [address] [place] [address] Rd.        Thank you,    Beryl Castillo  Patient

## 2020-08-05 ENCOUNTER — TELEPHONE (OUTPATIENT)
Dept: SURGERY | Age: 75
End: 2020-08-05

## 2020-08-05 NOTE — TELEPHONE ENCOUNTER
Patient is requesting a return call to schedule surgery sooner. Patient states he would recommend another Dr in office if patient decided she wanted the surgery sooner. Please advise, Thank you!

## 2020-08-05 NOTE — TELEPHONE ENCOUNTER
Spoke with patient. Informed her that [address] from Dr. Andy Cogan office will be calling her tomorrow to schedule surgery.

## 2020-08-06 ENCOUNTER — NURSE ONLY (OUTPATIENT)
Dept: PRIMARY CARE CLINIC | Age: 75
End: 2020-08-06
Payer: MEDICARE

## 2020-08-06 PROCEDURE — 99211 OFF/OP EST MAY X REQ PHY/QHP: CPT | Performed by: NURSE PRACTITIONER

## 2020-08-06 NOTE — PROGRESS NOTES
[facility] / [facility] ([facility]) [address] [facility], [address]    Acknowledgment of Informed Consent for Surgical or Medical Procedure and Sedation  I agree to allow doctor(s) Miles Steiner and his/her associates or assistants, including residents and/or other qualified medical practitioner to perform the following medical treatment or procedure and to administer or direct the administration of sedation as necessary:  Procedure(s): ROBOTIC [address]  My doctor has explained the following regarding the proposed procedure:   the explanation of the procedure   the benefits of the procedure   the potential problems that might occur during recuperation   the risks and side effects of the procedure which could include but are not limited to severe blood loss, infection, stroke or death   the benefits, risks and side effect of alternative procedures including the consequences of declining this procedure or any alternative procedures   the likelihood of achieving satisfactory results. I acknowledge no guarantee or assurance has been made to me regarding the results. I understand that during the course of this treatment/procedure, unforeseen conditions can occur which require an additional or different procedure. I agree to allow my physician or assistants to perform such extension of the original procedure as they may find necessary. I understand that sedation will often result in temporary impairment of memory and fine motor skills and that sedation can occasionally progress to a state of deep sedation or general anesthesia. I understand the risks of anesthesia for surgery include, but are not limited to, sore throat, hoarseness, injury to face, mouth, or teeth; nausea; headache; injury to blood vessels or nerves; death, brain damage, or paralysis.     I understand that if I have a Limitation of Treatment order in effect during my hospitalization, the order may or may not be in effect during this procedure. I give my doctor permission to give me blood or blood products. I understand that there are risks with receiving blood such as hepatitis, AIDS, fever, or allergic reaction. I acknowledge that the risks, benefits, and alternatives of this treatment have been explained to me and that no express or implied warranty has been given by the hospital, any blood bank, or any person or entity as to the blood or blood components transfused. At the discretion of my doctor, I agree to allow observers, equipment/product representatives and allow photographing, and/or televising of the procedure, provided my name or identity is maintained confidentially. I agree the hospital may dispose of or use for scientific or educational purposes any tissue, fluid, or body parts which may be removed.     ________________________________Date________Time______ am/pm  (Sharon One)  Patient or Signature of Closest Relative or Legal Guardian    ________________________________Date________Time______am/pm      Page 1 of  1  Witness

## 2020-08-06 NOTE — TELEPHONE ENCOUNTER
Called pt, Surgery scheduled for robotic, incisional hernia repair with Dr. Marrero Left on 8/11/20. Pt getting Covid test today, and scheduling a H&P with Dr. Ilsa Fernandez ASAP.  Surgery information to be given to pt in person after her Covid test.

## 2020-08-10 ENCOUNTER — TELEPHONE (OUTPATIENT)
Dept: BARIATRICS/WEIGHT MGMT | Age: 75
End: 2020-08-10

## 2020-08-10 ENCOUNTER — OFFICE VISIT (OUTPATIENT)
Dept: FAMILY MEDICINE CLINIC | Age: 75
End: 2020-08-10
Payer: MEDICARE

## 2020-08-10 ENCOUNTER — ANESTHESIA EVENT (OUTPATIENT)
Dept: OPERATING ROOM | Age: 75
End: 2020-08-10
Payer: MEDICARE

## 2020-08-10 VITALS
HEART RATE: 72 BPM | HEIGHT: 60 IN | OXYGEN SATURATION: 99 % | WEIGHT: 154 LBS | DIASTOLIC BLOOD PRESSURE: 73 MMHG | RESPIRATION RATE: 14 BRPM | BODY MASS INDEX: 30.23 KG/M2 | SYSTOLIC BLOOD PRESSURE: 104 MMHG | TEMPERATURE: 98 F

## 2020-08-10 PROCEDURE — 99215 OFFICE O/P EST HI 40 MIN: CPT | Performed by: FAMILY MEDICINE

## 2020-08-10 PROCEDURE — 93000 ELECTROCARDIOGRAM COMPLETE: CPT | Performed by: FAMILY MEDICINE

## 2020-08-10 RX ORDER — ATORVASTATIN CALCIUM 20 MG/1
TABLET, FILM COATED ORAL
Qty: 90 TABLET | Refills: 0 | Status: SHIPPED | OUTPATIENT
Start: 2020-08-10 | End: 2020-11-05

## 2020-08-10 RX ORDER — CIPROFLOXACIN 500 MG/1
500 TABLET, FILM COATED ORAL 2 TIMES DAILY
Qty: 6 TABLET | Refills: 0 | Status: SHIPPED | OUTPATIENT
Start: 2020-08-10 | End: 2020-08-13

## 2020-08-10 RX ORDER — OMEPRAZOLE 40 MG/1
CAPSULE, DELAYED RELEASE ORAL
Qty: 90 CAPSULE | Refills: 0 | Status: SHIPPED | OUTPATIENT
Start: 2020-08-10 | End: 2020-11-05

## 2020-08-10 NOTE — PROGRESS NOTES
Preoperative Consultation      Fernando Urbano  YOB: 1945    Date of Service:  8/10/2020    Vitals:    08/10/20 1106   BP: 104/73   Pulse: 72   Resp: 14   Temp: 98 °F (36.7 °C)   TempSrc: Tympanic   SpO2: 99%   Weight: 154 lb (69.9 kg)   Height: 5' (1.524 m)      Wt Readings from Last 2 Encounters:   08/10/20 154 lb (69.9 kg)   08/03/20 156 lb 6.4 oz (70.9 kg)     BP Readings from Last 3 Encounters:   08/10/20 104/73   08/03/20 136/72   02/14/20 132/81        Chief Complaint   Patient presents with    Pre-op Exam     hernia repair 8/11/20 w/ Dr. Jena Lim     Allergies   Allergen Reactions    Sulfa Antibiotics      anaphylaxis    Codeine      vomiting    Demerol      nausea    Nsaids Dermatitis     Outpatient Medications Marked as Taking for the 8/10/20 encounter (Office Visit) with Janice Marin MD   Medication Sig Dispense Refill    atorvastatin (LIPITOR) 20 MG tablet TAKE ONE TABLET BY MOUTH DAILY 90 tablet 0    omeprazole (PRILOSEC) 40 MG delayed release capsule TAKE ONE CAPSULE BY MOUTH DAILY 90 capsule 0    levothyroxine (SYNTHROID) 100 MCG tablet       CALCIUM PO Take by mouth      predniSONE (DELTASONE) 5 MG tablet Take 5 mg by mouth daily         This patient presents to the office today for a preoperative consultation at the request of surgeon, Dr. Skylar Young, who plans on performing umbilical hernia repair on August 11 at [place] [address].  The current problem began 2 weeks ago, and symptoms have been improving with time. Conservative therapy: hernia reduced in the ER.     Planned anesthesia: General   Known anesthesia problems: None   Bleeding risk: No recent or remote history of abnormal bleeding  Personal or FH of DVT/PE: No    Patient objection to receiving blood products: No    Patient Active Problem List   Diagnosis    Hypopituitarism ([address]    Hyperlipemia    Osteoporosis    Reactive depression (situational)    Acromegaly and gigantism ([address]    Hypothyroidism    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    Glucocorticoid deficiency ([facility]    Vitamin D deficiency    Osteopenia    Nausea & vomiting    Hiatal hernia    Elevated liver enzymes    Need for shingles vaccine    Anemia       Past Medical History:   Diagnosis Date    Anxiety     Depression     GERD (gastroesophageal reflux disease)     Hypothyroidism     Osteoporosis     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    Restless legs syndrome (RLS)     Wears glasses      Past Surgical History:   Procedure Laterality Date    CARPAL TUNNEL RELEASE Bilateral     CHOLECYSTECTOMY  05/13/2013    DIAGNOSTIC LAPAROSCOPY,LIGATION OF RIGHT ROUND    JOINT REPLACEMENT Bilateral     2006 & 2007    PITUITARY EXCISION  1990 5/6 removed - Dr Xavi Escoto     Family History   Problem Relation Age of Onset    No Known Problems Mother     No Known Problems Father     No Known Problems Maternal Grandmother     No Known Problems Maternal Grandfather     No Known Problems Paternal Grandmother     No Known Problems Paternal Grandfather     No Known Problems Sister     No Known Problems Brother     No Known Problems Paternal Aunt     No Known Problems Paternal Uncle     No Known Problems Maternal Aunt     No Known Problems Maternal Uncle     No Known Problems Other     Anesth Problems Neg Hx     Malig Hyperten Neg Hx     Hypotension Neg Hx     Malig Hypertherm Neg Hx     Pseudochol.  Deficiency Neg Hx      Social History     Socioeconomic History    Marital status: Single     Spouse name: Not on file    Number of children: Not on file    Years of education: Not on file    Highest education level: Not on file   Occupational History    Not on file   Social Needs    Financial resource strain: Not on file    Food insecurity     Worry: Not on file     Inability: Not on file    Transportation needs     Medical: Not on file     Non-medical: Not on file   Tobacco Use    Smoking status: Never Smoker    Smokeless tobacco: Never Used   Substance and Sexual Activity    Alcohol use: No    Drug use: No    Sexual activity: Yes     Partners: Female   Lifestyle    Physical activity     Days per week: Not on file     Minutes per session: Not on file    Stress: Not on file   Relationships    Social connections     Talks on phone: Not on file     Gets together: Not on file     Attends [employer] service: Not on file     Active member of club or organization: Not on file     Attends meetings of clubs or organizations: Not on file     Relationship status: Not on file    Intimate partner violence     Fear of current or ex partner: Not on file     Emotionally abused: Not on file     Physically abused: Not on file     Forced sexual activity: Not on file   Other Topics Concern    Not on file   Social History Narrative    Not on file       Review of Systems  A comprehensive review of systems was negative except for what was noted in the HPI. No dysuria      Physical Exam   Constitutional: She is oriented to person, place, and time. She appears well-developed and well-nourished. No distress. HENT:   Head: Normocephalic and atraumatic. Mouth/Throat: Uvula is midline, oropharynx is clear and moist and mucous membranes are normal.   Eyes: Conjunctivae and EOM are normal. Pupils are equal, round, and reactive to light. Neck: Trachea normal and normal range of motion. Neck supple. No JVD present. Carotid bruit is not present. No mass and no thyromegaly present. Cardiovascular: Normal rate, regular rhythm, normal heart sounds and intact distal pulses. Exam reveals no gallop and no friction rub. No murmur heard. Pulmonary/Chest: Effort normal and breath sounds normal. No respiratory distress. She has no wheezes. She has no rales. Abdominal: Soft. Normal aorta and bowel sounds are normal. She exhibits no distension and no mass. There is no hepatosplenomegaly. No tenderness.    Musculoskeletal: She exhibits no edema and no tenderness. Neurological: She is alert and oriented to person, place, and time. She has normal strength. No cranial nerve deficit. Coordination and gait normal.   Skin: Skin is warm and dry. No rash noted. No erythema. Psychiatric: She has a normal mood and affect. Her behavior is normal.     EKG Interpretation:  normal sinus rhythm, low voltage    Lab Review  Reviewed on Care Everywhere. Urine cx in the ER was positive - she was notified. She is asymptomatic. CBC normal.  Cr 1.09    K 4.2       Assessment:       77 y.o. patient with planned surgery as above. Known risk factors for perioperative complications: panhypopituitary. Current medications which may produce withdrawal symptoms if withheld perioperatively: none      Plan:     1. Preoperative workup as follows: ECG  2. Change in medication regimen before surgery: medication management per endocrinology, Dr. Melani Dukes. Melanie Robertson spoke to her this am.   3. Prophylaxis for cardiac events with perioperative beta-blockers: Not indicated  ACC/AHA indications for pre-operative beta-blocker use:    · Vascular surgery with history of postitive stress test  · Intermediate or high risk surgery with history of CAD   · Intermediate or high risk surgery with multiple clinical predictors of CAD- 2 of the following: history of compensated or prior heart failure, history of cerebrovascular disease, DM, or renal insufficiency    Routine administration of higher-dose, long-acting metoprolol in beta-blocker-naïve patients on the day of surgery, and in the absence of dose titration is associated with an overall increase in mortality. Beta-blockers should be started days to weeks prior to surgery and titrated to pulse < 70.  4. Deep vein thrombosis prophylaxis: not indicated  5. No contraindications to planned surgery  6. UTI - Cipro   2 doses today.

## 2020-08-10 NOTE — TELEPHONE ENCOUNTER
Dr. Warden Pierre, pt's endocrinologist said pt could be given 1 dose of either: Solu Medrol 40 mg or hydro cortisone 100 mg by anesthesia the morning of surgery.

## 2020-08-10 NOTE — TELEPHONE ENCOUNTER
Pt is scheduled for surgery with Dr. Burt Dill tomorrow. When speaking with her to confirm she was getting her H&P and orders from her endocrinologist, she stated that her endocrinologist wanted her to have a dose of Solu Medrol, or another medicine, but seemed confused on dosing. She stated her dr mailed us a letter. I asked pt to call them and have them fax it to us instead. Can you please scan that and route it to Dr. Burt Dill so he can see her endocrinologist's advice? I told her to put the fax to your attention.

## 2020-08-10 NOTE — PROGRESS NOTES
[facility] PRE-SURGICAL TESTING INSTRUCTIONS                              PRIOR TO PROCEDURE DATE:  1. Please follow any guidelines/instructions prior to your procedure as advised by your surgeon. 2. Arrange for someone to drive you home and be with you for the first 24 hours after discharge for your safety after your procedure for which you received sedation. Ensure it is someone we can share information with regarding your discharge. 3. You must contact your surgeon for instructions IF:   You are taking any blood thinners, aspirin, anti-inflammatory or vitamin E.   There is a change in your physical condition such as a cold, fever, rash, cuts, sores or any other infection, especially near your surgical site. 4. Do not drink alcohol the day before or day of your procedure. 5. A Pre-op History and Physical for surgery MUST be completed by your Physician or Urgent Care within 30 days of your procedure date. Please bring a copy with you on the day of your procedure and along with any other testing performed. THE DAY OF YOUR PROCEDURE:  1. Follow instructions for ARRIVAL TIME as DIRECTED BY YOUR SURGEON. I    2. Enter the MAIN entrance from [employer] and follow the signs to the free [employer] or [employer] parking (offered free of charge 6am-5pm). 3. Enter the Main Entrance of the hospital (do not enter from the lower level of the parking garage). Upon entrance, check in with the  at the main desk on your left. If no one is available at the desk, proceed into the [facility] Waiting Room and go through the door directly into the [facility]. There is a Check-in desk ACROSS from Room 5 (marked with a sign hanging from the ceiling). The phone number for the surgery center is [phone]. 4. Please call [phone] option #2 option #2 if you have not been preregistered yet. On the day of your procedure bring your insurance card and photo ID.  You will be registered at your bedside once brought back to your room. 5. DO NOT EAT ANYTHING eight hours prior to surgery. May have 8 ounces of water 4 hours prior to surgery. 6. MEDICATIONS    Take the following medications with a SMALL sip of water: levothyroxine and omeprazole. Patient to get clarification about steroids from endocrinologist.   Use your usual dose of inhalers the morning of surgery. BRING your rescue inhaler with you to hospital.    Anesthesia does NOT want you to take insulin the morning of surgery. They will control your blood sugar while you are at the hospital. Please contact your ordering physician for instructions regarding your insulin the night before your procedure. If you have an insulin pump, please keep it set on basal rate. 7. Do not swallow water when brushing teeth. No gum, candy, mints or ice chips. Refrain from smoking or at least decrease the amount. 8. Dress in loose, comfortable clothing appropriate for redressing after your procedure. Do not wear jewelry (including body piercings), make-up (especially NO eye make-up), fingernail polish (NO toenail polish if foot/leg surgery), lotion, powders or metal hairclips. 9. Dentures, glasses, or contacts will need to be removed before your procedure. Bring cases for your glasses, contacts, dentures, or hearing aids to protect them while you are in surgery. 10. If you use a CPAP, please bring it with you on the day of your procedure. 11. We recommend that valuable personal  belongings such as cash, cell phones, e-tablets or jewelry, be left at home during your stay. The hospital will not be responsible for valuables that are not secured in the hospital safe. However, if your insurance requires a co-pay, you may want to bring a method of payment, i.e. Check or credit card, if you wish to pay your co-pay the day of surgery. 12. If you are to stay overnight, you may bring a bag with personal items.  Please have any large items you may need brought in by your family after your arrival to your hospital room. 15. If you have a Living Will or Durable Power of , please bring a copy on the day of your procedure. 15. With your permission, one family member may accompany you while you are being prepared for surgery. Once you are ready, additional family members may join you. HOW WE KEEP YOU SAFE and WORK TO PREVENT SURGICAL SITE INFECTIONS:  1. Health care workers should always check your ID bracelet to verify your name and birth date. You will be asked many times to state your name, date of birth, and allergies. 2. Health care workers should always clean their hands with soap or alcohol gel before providing care to you. It is okay to ask anyone if they cleaned their hands before they touch you. 3. You will be actively involved in verifying the type of procedure you are having and ensuring the correct surgical site. This will be confirmed multiple times prior to your procedure. Do NOT ashkan your surgery site UNLESS instructed to by your surgeon. 4. Do not shave or wax for 72 hours prior to procedure near your operative site. Shaving with a razor can irritate your skin and make it easier to develop an infection. On the day of your procedure, any hair that needs to be removed near the surgical site will be clipped by a healthcare worker using a special clippers designed to avoid skin irritation. 5. When you are in the operating room, your surgical site will be cleansed with a special soap, and in most cases, you will be given an antibiotic before the surgery begins. What to expect AFTER YOUR PROCEDURE:  1. Immediately following your procedure, your will be taken to the PACU for the first phase of your recovery. Your nurse will help you recover from any potential side effects of anesthesia, such as extreme drowsiness, changes in your vital signs or breathing patterns.  Nausea, headache, muscle aches, or sore throat may also occur after anesthesia. Your nurse will help you manage these potential side effects. 2. For comfort and safety, arrange to have someone at home with you for the first 24 hours after discharge. 3. You and your family will be given written instructions about your diet, activity, dressing care, medications, and return visits. 4. Once at home, should issues with nausea, pain, or bleeding occur, or should you notice any signs of infection, you should call your surgeon. 5. Always clean your hands before and after caring for your wound. Do not let your family touch your surgery site without cleaning their hands. 6. Narcotic pain medications can cause significant constipation. You may want to add a stool softener to your postoperative medication schedule or speak to your surgeon on how best to manage this SIDE EFFECT. SPECIAL INSTRUCTIONS   Thank you for allowing us to care for you. We strive to exceed your expectations in the delivery of care and service provided to you and your family. If you need to contact us for any reason, please call us at [phone]    Instructions reviewed with patient during preadmission testing phone interview. Richelle Gordon. 8/10/2020 .2:50 PM      ADDITIONAL EDUCATIONAL INFORMATION REVIEWED PER PHONE WITH YOU AND/OR YOUR FAMILY:  Yes Antibacterial Soap

## 2020-08-10 NOTE — TELEPHONE ENCOUNTER
Called nephro office. Got a verbal order. Pt can be given Solu medrol 40 mg or hydro cortisone 100 mg by anesthesia the morning of surgery. Dr. Shayy Wallace notified.

## 2020-08-11 ENCOUNTER — ANESTHESIA (OUTPATIENT)
Dept: OPERATING ROOM | Age: 75
End: 2020-08-11
Payer: MEDICARE

## 2020-08-11 ENCOUNTER — HOSPITAL ENCOUNTER (OUTPATIENT)
Age: 75
Setting detail: OUTPATIENT SURGERY
Discharge: HOME | End: 2020-08-11
Attending: SURGERY | Admitting: SURGERY
Payer: MEDICARE

## 2020-08-11 VITALS
HEIGHT: 60 IN | TEMPERATURE: 96.9 F | SYSTOLIC BLOOD PRESSURE: 152 MMHG | RESPIRATION RATE: 16 BRPM | WEIGHT: 154 LBS | DIASTOLIC BLOOD PRESSURE: 72 MMHG | OXYGEN SATURATION: 98 % | BODY MASS INDEX: 30.23 KG/M2 | HEART RATE: 66 BPM

## 2020-08-11 VITALS — DIASTOLIC BLOOD PRESSURE: 104 MMHG | TEMPERATURE: 94.6 F | OXYGEN SATURATION: 99 % | SYSTOLIC BLOOD PRESSURE: 201 MMHG

## 2020-08-11 PROCEDURE — 2500000003 HC RX 250 WO HCPCS: Performed by: NURSE ANESTHETIST, CERTIFIED REGISTERED

## 2020-08-11 PROCEDURE — S2900 ROBOTIC SURGICAL SYSTEM: HCPCS | Performed by: SURGERY

## 2020-08-11 PROCEDURE — 6360000002 HC RX W HCPCS: Performed by: ANESTHESIOLOGY

## 2020-08-11 PROCEDURE — 3700000001 HC ADD 15 MINUTES (ANESTHESIA): Performed by: SURGERY

## 2020-08-11 PROCEDURE — 7100000001 HC PACU RECOVERY - ADDTL 15 MIN: Performed by: SURGERY

## 2020-08-11 PROCEDURE — C1781 MESH (IMPLANTABLE): HCPCS | Performed by: SURGERY

## 2020-08-11 PROCEDURE — 6370000000 HC RX 637 (ALT 250 FOR IP): Performed by: STUDENT IN AN ORGANIZED HEALTH CARE EDUCATION/TRAINING PROGRAM

## 2020-08-11 PROCEDURE — 2580000003 HC RX 258: Performed by: SURGERY

## 2020-08-11 PROCEDURE — 7100000000 HC PACU RECOVERY - FIRST 15 MIN: Performed by: SURGERY

## 2020-08-11 PROCEDURE — 6360000002 HC RX W HCPCS: Performed by: SURGERY

## 2020-08-11 PROCEDURE — 6360000002 HC RX W HCPCS: Performed by: NURSE ANESTHETIST, CERTIFIED REGISTERED

## 2020-08-11 PROCEDURE — 49655 PR LAP, INCISIONAL HERNIA REPAIR,INCARCERATED: CPT | Performed by: SURGERY

## 2020-08-11 PROCEDURE — 7100000010 HC PHASE II RECOVERY - FIRST 15 MIN: Performed by: SURGERY

## 2020-08-11 PROCEDURE — 3700000000 HC ANESTHESIA ATTENDED CARE: Performed by: SURGERY

## 2020-08-11 PROCEDURE — 2709999900 HC NON-CHARGEABLE SUPPLY: Performed by: SURGERY

## 2020-08-11 PROCEDURE — 3600000009 HC SURGERY ROBOT BASE: Performed by: SURGERY

## 2020-08-11 PROCEDURE — 2580000003 HC RX 258: Performed by: ANESTHESIOLOGY

## 2020-08-11 PROCEDURE — 3600000019 HC SURGERY ROBOT ADDTL 15MIN: Performed by: SURGERY

## 2020-08-11 PROCEDURE — 2500000003 HC RX 250 WO HCPCS: Performed by: SURGERY

## 2020-08-11 PROCEDURE — L0450 TLSO FLEX TRUNK/THOR PRE OTS: HCPCS | Performed by: SURGERY

## 2020-08-11 PROCEDURE — 6370000000 HC RX 637 (ALT 250 FOR IP): Performed by: ANESTHESIOLOGY

## 2020-08-11 DEVICE — MESH HERN DIA4.5IN MFIL RESRB RND W/ HYDRGEL BARR SCFLD: Type: IMPLANTABLE DEVICE | Site: ABDOMEN | Status: FUNCTIONAL

## 2020-08-11 RX ORDER — LABETALOL 20 MG/4 ML (5 MG/ML) INTRAVENOUS SYRINGE
5 EVERY 10 MIN PRN
Status: DISCONTINUED | OUTPATIENT
Start: 2020-08-11 | End: 2020-08-11 | Stop reason: HOSPADM

## 2020-08-11 RX ORDER — DOCUSATE SODIUM 100 MG/1
100 CAPSULE, LIQUID FILLED ORAL 2 TIMES DAILY PRN
Qty: 60 CAPSULE | Refills: 0 | Status: SHIPPED | OUTPATIENT
Start: 2020-08-11 | End: 2020-09-10

## 2020-08-11 RX ORDER — SODIUM CHLORIDE 0.9 % (FLUSH) 0.9 %
10 SYRINGE (ML) INJECTION EVERY 12 HOURS SCHEDULED
Status: DISCONTINUED | OUTPATIENT
Start: 2020-08-11 | End: 2020-08-11 | Stop reason: HOSPADM

## 2020-08-11 RX ORDER — ESMOLOL HYDROCHLORIDE 10 MG/ML
INJECTION INTRAVENOUS PRN
Status: DISCONTINUED | OUTPATIENT
Start: 2020-08-11 | End: 2020-08-11 | Stop reason: SDUPTHER

## 2020-08-11 RX ORDER — FENTANYL CITRATE 50 UG/ML
25 INJECTION, SOLUTION INTRAMUSCULAR; INTRAVENOUS EVERY 5 MIN PRN
Status: DISCONTINUED | OUTPATIENT
Start: 2020-08-11 | End: 2020-08-11 | Stop reason: HOSPADM

## 2020-08-11 RX ORDER — OXYCODONE HYDROCHLORIDE AND ACETAMINOPHEN 5; 325 MG/1; MG/1
1 TABLET ORAL
Status: COMPLETED | OUTPATIENT
Start: 2020-08-11 | End: 2020-08-11

## 2020-08-11 RX ORDER — SODIUM CHLORIDE, SODIUM LACTATE, POTASSIUM CHLORIDE, CALCIUM CHLORIDE 600; 310; 30; 20 MG/100ML; MG/100ML; MG/100ML; MG/100ML
INJECTION, SOLUTION INTRAVENOUS CONTINUOUS
Status: DISCONTINUED | OUTPATIENT
Start: 2020-08-11 | End: 2020-08-11 | Stop reason: HOSPADM

## 2020-08-11 RX ORDER — ONDANSETRON 2 MG/ML
INJECTION INTRAMUSCULAR; INTRAVENOUS PRN
Status: DISCONTINUED | OUTPATIENT
Start: 2020-08-11 | End: 2020-08-11 | Stop reason: SDUPTHER

## 2020-08-11 RX ORDER — HYDROMORPHONE HCL 110MG/55ML
PATIENT CONTROLLED ANALGESIA SYRINGE INTRAVENOUS PRN
Status: DISCONTINUED | OUTPATIENT
Start: 2020-08-11 | End: 2020-08-11 | Stop reason: SDUPTHER

## 2020-08-11 RX ORDER — SODIUM CHLORIDE 0.9 % (FLUSH) 0.9 %
10 SYRINGE (ML) INJECTION PRN
Status: DISCONTINUED | OUTPATIENT
Start: 2020-08-11 | End: 2020-08-11 | Stop reason: HOSPADM

## 2020-08-11 RX ORDER — SODIUM CHLORIDE 9 MG/ML
INJECTION, SOLUTION INTRAVENOUS CONTINUOUS
Status: DISCONTINUED | OUTPATIENT
Start: 2020-08-11 | End: 2020-08-11 | Stop reason: HOSPADM

## 2020-08-11 RX ORDER — MAGNESIUM HYDROXIDE 1200 MG/15ML
LIQUID ORAL CONTINUOUS PRN
Status: COMPLETED | OUTPATIENT
Start: 2020-08-11 | End: 2020-08-11

## 2020-08-11 RX ORDER — ACETAMINOPHEN 160 MG
1000 TABLET,DISINTEGRATING ORAL DAILY
COMMUNITY

## 2020-08-11 RX ORDER — PROPOFOL 10 MG/ML
INJECTION, EMULSION INTRAVENOUS PRN
Status: DISCONTINUED | OUTPATIENT
Start: 2020-08-11 | End: 2020-08-11 | Stop reason: SDUPTHER

## 2020-08-11 RX ORDER — FENTANYL CITRATE 50 UG/ML
50 INJECTION, SOLUTION INTRAMUSCULAR; INTRAVENOUS EVERY 5 MIN PRN
Status: DISCONTINUED | OUTPATIENT
Start: 2020-08-11 | End: 2020-08-11 | Stop reason: HOSPADM

## 2020-08-11 RX ORDER — ACETAMINOPHEN 500 MG
1000 TABLET ORAL
Status: COMPLETED | OUTPATIENT
Start: 2020-08-11 | End: 2020-08-11

## 2020-08-11 RX ORDER — BUPIVACAINE HYDROCHLORIDE 5 MG/ML
INJECTION, SOLUTION EPIDURAL; INTRACAUDAL PRN
Status: DISCONTINUED | OUTPATIENT
Start: 2020-08-11 | End: 2020-08-11 | Stop reason: ALTCHOICE

## 2020-08-11 RX ORDER — OXYCODONE HYDROCHLORIDE AND ACETAMINOPHEN 5; 325 MG/1; MG/1
1 TABLET ORAL EVERY 6 HOURS PRN
Qty: 16 TABLET | Refills: 0 | Status: SHIPPED | OUTPATIENT
Start: 2020-08-11 | End: 2020-08-15

## 2020-08-11 RX ORDER — LIDOCAINE HYDROCHLORIDE 20 MG/ML
INJECTION, SOLUTION EPIDURAL; INFILTRATION; INTRACAUDAL; PERINEURAL PRN
Status: DISCONTINUED | OUTPATIENT
Start: 2020-08-11 | End: 2020-08-11 | Stop reason: SDUPTHER

## 2020-08-11 RX ORDER — FENTANYL CITRATE 50 UG/ML
INJECTION, SOLUTION INTRAMUSCULAR; INTRAVENOUS PRN
Status: DISCONTINUED | OUTPATIENT
Start: 2020-08-11 | End: 2020-08-11 | Stop reason: SDUPTHER

## 2020-08-11 RX ORDER — HEPARIN SODIUM 5000 [USP'U]/ML
5000 INJECTION, SOLUTION INTRAVENOUS; SUBCUTANEOUS ONCE
Status: COMPLETED | OUTPATIENT
Start: 2020-08-11 | End: 2020-08-11

## 2020-08-11 RX ORDER — HYDRALAZINE HYDROCHLORIDE 20 MG/ML
5 INJECTION INTRAMUSCULAR; INTRAVENOUS EVERY 10 MIN PRN
Status: DISCONTINUED | OUTPATIENT
Start: 2020-08-11 | End: 2020-08-11 | Stop reason: HOSPADM

## 2020-08-11 RX ORDER — PROMETHAZINE HYDROCHLORIDE 25 MG/ML
6.25 INJECTION, SOLUTION INTRAMUSCULAR; INTRAVENOUS
Status: COMPLETED | OUTPATIENT
Start: 2020-08-11 | End: 2020-08-11

## 2020-08-11 RX ORDER — ONDANSETRON 2 MG/ML
4 INJECTION INTRAMUSCULAR; INTRAVENOUS
Status: COMPLETED | OUTPATIENT
Start: 2020-08-11 | End: 2020-08-11

## 2020-08-11 RX ORDER — ROCURONIUM BROMIDE 10 MG/ML
INJECTION, SOLUTION INTRAVENOUS PRN
Status: DISCONTINUED | OUTPATIENT
Start: 2020-08-11 | End: 2020-08-11 | Stop reason: SDUPTHER

## 2020-08-11 RX ADMIN — HYDROMORPHONE HYDROCHLORIDE 1 MG: 2 INJECTION, SOLUTION INTRAMUSCULAR; INTRAVENOUS; SUBCUTANEOUS at 12:10

## 2020-08-11 RX ADMIN — LIDOCAINE HYDROCHLORIDE 100 MG: 20 INJECTION, SOLUTION EPIDURAL; INFILTRATION; INTRACAUDAL; PERINEURAL at 09:58

## 2020-08-11 RX ADMIN — ROCURONIUM BROMIDE 30 MG: 10 INJECTION INTRAVENOUS at 11:03

## 2020-08-11 RX ADMIN — FENTANYL CITRATE 100 MCG: 50 INJECTION INTRAMUSCULAR; INTRAVENOUS at 09:55

## 2020-08-11 RX ADMIN — HYDROCORTISONE SODIUM SUCCINATE 100 MG: 100 INJECTION, POWDER, FOR SOLUTION INTRAMUSCULAR; INTRAVENOUS at 09:49

## 2020-08-11 RX ADMIN — SUGAMMADEX 200 MG: 100 INJECTION, SOLUTION INTRAVENOUS at 12:05

## 2020-08-11 RX ADMIN — SODIUM CHLORIDE, SODIUM LACTATE, POTASSIUM CHLORIDE, AND CALCIUM CHLORIDE: 600; 310; 30; 20 INJECTION, SOLUTION INTRAVENOUS at 09:15

## 2020-08-11 RX ADMIN — FENTANYL CITRATE 75 MCG: 50 INJECTION INTRAMUSCULAR; INTRAVENOUS at 11:05

## 2020-08-11 RX ADMIN — HYDROMORPHONE HYDROCHLORIDE 0.5 MG: 1 INJECTION, SOLUTION INTRAMUSCULAR; INTRAVENOUS; SUBCUTANEOUS at 13:08

## 2020-08-11 RX ADMIN — HEPARIN SODIUM 5000 UNITS: 5000 INJECTION INTRAVENOUS; SUBCUTANEOUS at 09:24

## 2020-08-11 RX ADMIN — ONDANSETRON 4 MG: 2 INJECTION INTRAMUSCULAR; INTRAVENOUS at 13:28

## 2020-08-11 RX ADMIN — SODIUM CHLORIDE, SODIUM LACTATE, POTASSIUM CHLORIDE, AND CALCIUM CHLORIDE: 600; 310; 30; 20 INJECTION, SOLUTION INTRAVENOUS at 09:00

## 2020-08-11 RX ADMIN — ESMOLOL HYDROCHLORIDE 10 MG: 10 INJECTION, SOLUTION INTRAVENOUS at 10:52

## 2020-08-11 RX ADMIN — PROPOFOL 130 MG: 10 INJECTION, EMULSION INTRAVENOUS at 09:58

## 2020-08-11 RX ADMIN — PROMETHAZINE HYDROCHLORIDE 6.25 MG: 25 INJECTION INTRAMUSCULAR; INTRAVENOUS at 15:27

## 2020-08-11 RX ADMIN — ESMOLOL HYDROCHLORIDE 20 MG: 10 INJECTION, SOLUTION INTRAVENOUS at 12:03

## 2020-08-11 RX ADMIN — ACETAMINOPHEN 1000 MG: 500 TABLET ORAL at 14:23

## 2020-08-11 RX ADMIN — ONDANSETRON 4 MG: 2 INJECTION INTRAMUSCULAR; INTRAVENOUS at 11:40

## 2020-08-11 RX ADMIN — FENTANYL CITRATE 50 MCG: 50 INJECTION, SOLUTION INTRAMUSCULAR; INTRAVENOUS at 12:39

## 2020-08-11 RX ADMIN — CEFAZOLIN 2 G: 330 INJECTION, POWDER, FOR SOLUTION INTRAMUSCULAR; INTRAVENOUS at 10:09

## 2020-08-11 RX ADMIN — PROPOFOL 70 MG: 10 INJECTION, EMULSION INTRAVENOUS at 10:40

## 2020-08-11 RX ADMIN — OXYCODONE HYDROCHLORIDE AND ACETAMINOPHEN 1 TABLET: 5; 325 TABLET ORAL at 13:20

## 2020-08-11 RX ADMIN — ROCURONIUM BROMIDE 50 MG: 10 INJECTION INTRAVENOUS at 09:58

## 2020-08-11 RX ADMIN — HYDROMORPHONE HYDROCHLORIDE 1 MG: 2 INJECTION, SOLUTION INTRAMUSCULAR; INTRAVENOUS; SUBCUTANEOUS at 12:14

## 2020-08-11 ASSESSMENT — PULMONARY FUNCTION TESTS
PIF_VALUE: 28
PIF_VALUE: 28
PIF_VALUE: 20
PIF_VALUE: 23
PIF_VALUE: 20
PIF_VALUE: 22
PIF_VALUE: 0
PIF_VALUE: 29
PIF_VALUE: 0
PIF_VALUE: 32
PIF_VALUE: 29
PIF_VALUE: 0
PIF_VALUE: 28
PIF_VALUE: 19
PIF_VALUE: 31
PIF_VALUE: 20
PIF_VALUE: 0
PIF_VALUE: 21
PIF_VALUE: 25
PIF_VALUE: 20
PIF_VALUE: 21
PIF_VALUE: 29
PIF_VALUE: 28
PIF_VALUE: 22
PIF_VALUE: 31
PIF_VALUE: 32
PIF_VALUE: 24
PIF_VALUE: 28
PIF_VALUE: 1
PIF_VALUE: 21
PIF_VALUE: 19
PIF_VALUE: 20
PIF_VALUE: 30
PIF_VALUE: 28
PIF_VALUE: 30
PIF_VALUE: 2
PIF_VALUE: 8
PIF_VALUE: 21
PIF_VALUE: 32
PIF_VALUE: 22
PIF_VALUE: 14
PIF_VALUE: 30
PIF_VALUE: 20
PIF_VALUE: 12
PIF_VALUE: 6
PIF_VALUE: 21
PIF_VALUE: 19
PIF_VALUE: 30
PIF_VALUE: 31
PIF_VALUE: 28
PIF_VALUE: 28
PIF_VALUE: 32
PIF_VALUE: 0
PIF_VALUE: 24
PIF_VALUE: 32
PIF_VALUE: 30
PIF_VALUE: 20
PIF_VALUE: 30
PIF_VALUE: 1
PIF_VALUE: 20
PIF_VALUE: 32
PIF_VALUE: 31
PIF_VALUE: 30
PIF_VALUE: 20
PIF_VALUE: 30
PIF_VALUE: 32
PIF_VALUE: 20
PIF_VALUE: 19
PIF_VALUE: 6
PIF_VALUE: 20
PIF_VALUE: 20
PIF_VALUE: 6
PIF_VALUE: 31
PIF_VALUE: 29
PIF_VALUE: 31
PIF_VALUE: 28
PIF_VALUE: 30
PIF_VALUE: 30
PIF_VALUE: 24
PIF_VALUE: 19
PIF_VALUE: 21
PIF_VALUE: 31
PIF_VALUE: 20
PIF_VALUE: 31
PIF_VALUE: 31
PIF_VALUE: 28
PIF_VALUE: 26
PIF_VALUE: 23
PIF_VALUE: 3
PIF_VALUE: 0
PIF_VALUE: 28
PIF_VALUE: 19
PIF_VALUE: 19
PIF_VALUE: 23
PIF_VALUE: 20
PIF_VALUE: 19
PIF_VALUE: 31
PIF_VALUE: 21
PIF_VALUE: 32
PIF_VALUE: 30
PIF_VALUE: 31
PIF_VALUE: 6
PIF_VALUE: 24
PIF_VALUE: 31
PIF_VALUE: 34
PIF_VALUE: 20
PIF_VALUE: 30
PIF_VALUE: 32
PIF_VALUE: 23
PIF_VALUE: 20
PIF_VALUE: 25
PIF_VALUE: 24
PIF_VALUE: 20
PIF_VALUE: 20
PIF_VALUE: 1
PIF_VALUE: 28
PIF_VALUE: 29
PIF_VALUE: 28
PIF_VALUE: 5
PIF_VALUE: 0
PIF_VALUE: 19
PIF_VALUE: 32
PIF_VALUE: 24
PIF_VALUE: 34
PIF_VALUE: 30
PIF_VALUE: 30
PIF_VALUE: 31
PIF_VALUE: 30
PIF_VALUE: 22
PIF_VALUE: 20
PIF_VALUE: 30
PIF_VALUE: 19
PIF_VALUE: 0
PIF_VALUE: 0
PIF_VALUE: 20
PIF_VALUE: 29
PIF_VALUE: 21
PIF_VALUE: 28
PIF_VALUE: 22
PIF_VALUE: 21
PIF_VALUE: 0
PIF_VALUE: 24
PIF_VALUE: 20
PIF_VALUE: 31

## 2020-08-11 ASSESSMENT — PAIN DESCRIPTION - DESCRIPTORS
DESCRIPTORS: ACHING;SORE

## 2020-08-11 ASSESSMENT — PAIN DESCRIPTION - LOCATION
LOCATION: ABDOMEN

## 2020-08-11 ASSESSMENT — PAIN SCALES - GENERAL
PAINLEVEL_OUTOF10: 3
PAINLEVEL_OUTOF10: 7
PAINLEVEL_OUTOF10: 3
PAINLEVEL_OUTOF10: 6
PAINLEVEL_OUTOF10: 5
PAINLEVEL_OUTOF10: 5
PAINLEVEL_OUTOF10: 8

## 2020-08-11 ASSESSMENT — PAIN DESCRIPTION - ORIENTATION
ORIENTATION: MID

## 2020-08-11 ASSESSMENT — PAIN DESCRIPTION - PAIN TYPE
TYPE: ACUTE PAIN;SURGICAL PAIN
TYPE: ACUTE PAIN
TYPE: ACUTE PAIN;SURGICAL PAIN

## 2020-08-11 ASSESSMENT — PAIN - FUNCTIONAL ASSESSMENT: PAIN_FUNCTIONAL_ASSESSMENT: 0-10

## 2020-08-11 NOTE — PROGRESS NOTES
Pt continuing to c/o abd pain. Dr Behzad Coats informed. Instructed to give pt one time dose of tordal. Pt stated she has generalized itching from all NSAIDS. Will place call to surgeon.

## 2020-08-11 NOTE — ANESTHESIA POSTPROCEDURE EVALUATION
Department of Anesthesiology  Postprocedure Note    Patient: Adina Kwon  MRN: [id number]  YOB: 1945  Date of evaluation: 8/11/2020  Time:  7:27 PM     Procedure Summary     Date:  08/11/20 Room / Location:  [address] [place] [address]    Anesthesia Start:  9094 Anesthesia Stop:  6698    Procedure:  ROBOTIC INCISIONAL INCARCERATED HERNIA REPAIR WITH MESH (N/A ) Diagnosis:       Incisional hernia, without obstruction or gangrene      (Incisional hernia, without obstruction or gangrene [K43.2])    Surgeon: Michelle Whiteside DO Responsible Provider:  Luis Oglesby DO    Anesthesia Type:  general ASA Status:  2          Anesthesia Type: general    Vangie Phase I: Vangie Score: 10    Vangie Phase II: Vangie Score: 10    Last vitals: Reviewed and per EMR flowsheets.        Anesthesia Post Evaluation    Patient location during evaluation: PACU  Patient participation: complete - patient participated  Level of consciousness: awake and alert  Pain score: 3  Airway patency: patent  Nausea & Vomiting: no nausea and no vomiting  Complications: no  Cardiovascular status: hemodynamically stable  Respiratory status: acceptable  Hydration status: euvolemic

## 2020-08-11 NOTE — OP NOTE
DATE OF PROCEDURE:  08/11/2020     PREOPERATIVE DIAGNOSIS:  Incarcerated incisional hernia.     POSTOPERATIVE DIAGNOSIS:  Incarcerated incisional hernia.     PROCEDURE PERFORMED:  Robotic incarcerated incisional hernia  repair with mesh.     SURGEON:  Aureliano Cardona DO     ASSISTANT:  Jack Beckford DO     ANESTHESIA:  General endotracheal.     COMPLICATIONS:  None.     CONDITION:  Stable.     ESTIMATED BLOOD LOSS:  20 mL.     INDICATION FOR PROCEDURE:  The patient is a 42-year-old female on chronic steroids for adrenal insufficiency with incisional hernia with incarcerated fat. She was boarded and consented after understanding all risks, benefits, and alternatives of the procedure.     DESCRIPTION OF PROCEDURE:  The patient was brought to the operative  suite, laid in the supine position with arms outstretched and after  induction of general endotracheal anesthesia, tube was confirmed to be  in place with end-tidal CO2 and secured. Armstrong catheter was placed with  clear return of urine. The patient was prepped and draped in the usual  sterile manner.     Using the existing peritoneal dialysis catheter, pneumoperitoneum was  established and this was to 12 mm of CO2. A 5-mm 30-degree camera  housed in a 5-mm Optiview trocar was used to enter the abdomen under  direct visualization in the right upper quadrant. Two additional 8-mm  trocars were placed and the original 5-mm trocar was replaced for an  8-mm trocar and robot was docked.     Attention was first paid to the hernia which was circumferentially dissected free and a piece of omentum that was incarcerated in it was completely reduced. Picture was taken before and after reduction of incarcerated fat.     Once this was accomplished, an approximately 2 cm defect was appreciated and this was primarily closed using #1 Stratafix suture. Once this was accomplished, an 11-cm Phasix ST mesh was introduced into the abdominal cavity with Prolene stitch in the middle.  A

## 2020-08-11 NOTE — PROGRESS NOTES
Pt arrived anxious with CO pain from surgery and CO breathing spO2 100 % @ 4L NC report from crna of recent dose of 2 mg hydromorphone and 25 mcg of Fentanyl, Steroids also given in OR

## 2020-08-11 NOTE — PROGRESS NOTES
Ambulatory Surgery/Procedure Discharge Note    Vitals:    08/11/20 1507   BP: (!) 152/72   Pulse: 66   Resp: 16   Temp: 96.9 °F (36.1 °C)   SpO2: 98%     Patient arrived in Phase II recovery alert and oriented. Reports 3 out of 10 pain. Patient received pain medication in PACU and reports no pain if sitting still. Patient slightly nauseated. Administered Phenergan as ordered. Patient reports decrease in nausea. Reviewed discharge instructions with patient and sister. Both verbalized understanding. No intake/output data recorded. Restroom use offered before discharge. Yes    Pain assessment:  present - adequately treated  Pain Level: 3        Patient discharged to home/self care.  Patient discharged via wheel chair by transporter to waiting family/S.O.       8/11/2020 3:54 PM

## 2020-08-11 NOTE — H&P
Full history and physical exam performed within the last 24 hours. Patient states no interval change since H&P completed.     Luis Enrique Knight 8/11/2020

## 2020-08-11 NOTE — ANESTHESIA PRE PROCEDURE
Department of Anesthesiology  Preprocedure Note       Name:  Nick Patterson   Age:  77 y.o.  :  1945                                          MRN:  [id number]         Date:  2020      Surgeon: Sigifredo Gutierrez): Marquise Ruiz DO    Procedure: Procedure(s):  ROBOTIC INCISIONAL HERNIA REPAIR    Medications prior to admission:   Prior to Admission medications    Medication Sig Start Date End Date Taking? Authorizing Provider   atorvastatin (LIPITOR) 20 MG tablet TAKE ONE TABLET BY MOUTH DAILY 8/10/20  Yes Conner Barros MD   omeprazole (PRILOSEC) 40 MG delayed release capsule TAKE ONE CAPSULE BY MOUTH DAILY 8/10/20  Yes Conner Barros MD   ciprofloxacin (CIPRO) 500 MG tablet Take 1 tablet by mouth 2 times daily for 3 days 8/10/20 8/13/20 Yes Conner Barros MD   levothyroxine (SYNTHROID) 100 MCG tablet  18  Yes Historical Provider, MD   CALCIUM PO Take by mouth   Yes Historical Provider, MD   predniSONE (DELTASONE) 5 MG tablet Take 5 mg by mouth daily   Yes Historical Provider, MD       Current medications:    Current Facility-Administered Medications   Medication Dose Route Frequency Provider Last Rate Last Dose    sodium chloride flush 0.9 % injection 10 mL  10 mL Intravenous 2 times per day Urbano Krunal, DO        sodium chloride flush 0.9 % injection 10 mL  10 mL Intravenous PRN Urbano Krunal, DO        0.9 % sodium chloride infusion   Intravenous Continuous Urbano Krunal, DO        lactated ringers infusion   Intravenous Continuous Urbano Krunal, DO        ceFAZolin (ANCEF) 2 g in dextrose 5 % 50 mL IVPB  2 g Intravenous Once Marquise Ruiz DO        heparin (porcine) injection 5,000 Units  5,000 Units Subcutaneous Once Marquise Ruiz DO           Allergies:     Allergies   Allergen Reactions    Sulfa Antibiotics      anaphylaxis    Codeine      vomiting    Demerol      nausea    Nsaids Dermatitis       Problem List:    Patient Active Problem List   Diagnosis Code    [facility] E23.0    Hyperlipemia E78.5    Osteoporosis M81.0    Reactive depression (situational) F32.9    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    Hypothyroidism E03.9    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    Glucocorticoid deficiency (HCC) E27.49    Vitamin D deficiency E55.9    Osteopenia M85.80    Nausea & vomiting R11.2    Hiatal hernia K44.9    Elevated liver enzymes R74.8    Need for shingles vaccine Z23    Anemia D64.9       Past Medical History:        Diagnosis Date    Anxiety     Depression     GERD (gastroesophageal reflux disease)     History of blood transfusion     Hypothyroidism     Osteoporosis     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    Restless legs syndrome (RLS)     Wears glasses        Past Surgical History:        Procedure Laterality Date    CARPAL TUNNEL RELEASE Bilateral     CHOLECYSTECTOMY  05/13/2013    DIAGNOSTIC LAPAROSCOPY,LIGATION OF RIGHT ROUND    JOINT REPLACEMENT Bilateral     2006 & 2007    PITUITARY EXCISION  1990 5/6 removed - Dr Walter Joyce       Social History:    Social History     Tobacco Use    Smoking status: Never Smoker    Smokeless tobacco: Never Used   Substance Use Topics    Alcohol use: No                                Counseling given: Not Answered      Vital Signs (Current):   Vitals:    08/11/20 0728   BP: 109/77   Pulse: 71   Resp: 16   Temp: 97.5 °F (36.4 °C)   TempSrc: Oral   SpO2: 98%   Weight: 154 lb (69.9 kg)   Height: 5' (1.524 m)                                              BP Readings from Last 3 Encounters:   08/11/20 109/77   08/10/20 104/73   08/03/20 136/72       NPO Status:                                                                                 BMI:   Wt Readings from Last 3 Encounters:   08/11/20 154 lb (69.9 kg)   08/10/20 154 lb (69.9 kg)   08/03/20 156 lb 6.4 oz (70.9 kg)     Body mass index is 30.08 kg/m².     CBC:   Lab Results   Component Value Date    WBC 10.1 07/10/2019    RBC 4.86 07/10/2019    HGB 14.7 07/10/2019    HCT 45.2 07/10/2019    MCV 93.1 07/10/2019    RDW 14.5 07/10/2019     07/10/2019       CMP:   Lab Results   Component Value Date     04/10/2018    K 4.0 04/10/2018     04/10/2018    CO2 24 04/10/2018    BUN 13 04/10/2018    CREATININE 0.9 04/10/2018    GFRAA >60 04/10/2018    GFRAA 112 05/14/2013    AGRATIO 1.5 04/10/2018    LABGLOM >60 04/10/2018    LABGLOM 58 12/02/2010    GLUCOSE 123 04/10/2018    PROT 6.7 04/10/2018    PROT 7.1 03/18/2013    CALCIUM 9.0 04/10/2018    BILITOT 0.3 04/10/2018    ALKPHOS 96 04/10/2018    AST 11 04/10/2018    ALT 9 04/10/2018       POC Tests: No results for input(s): POCGLU, POCNA, POCK, POCCL, POCBUN, POCHEMO, POCHCT in the last 72 hours.     Coags:   Lab Results   Component Value Date    PROTIME 13.0 05/09/2013    INR 1.0 05/09/2013    APTT 31.6 05/09/2013       HCG (If Applicable): No results found for: PREGTESTUR, PREGSERUM, HCG, HCGQUANT     ABGs: No results found for: PHART, PO2ART, ZYL9TBD, LNR3XDO, BEART, M9HMXMGR     Type & Screen (If Applicable):  No results found for: LABABO, LABRH    Drug/Infectious Status (If Applicable):  No results found for: HIV, HEPCAB    COVID-19 Screening (If Applicable): No results found for: COVID19      Anesthesia Evaluation  Patient summary reviewed and Nursing notes reviewed no history of anesthetic complications:   Airway: Mallampati: II  TM distance: >3 FB   Neck ROM: full  Mouth opening: > = 3 FB Dental: normal exam         Pulmonary:Negative Pulmonary ROS                              Cardiovascular:    (+) hyperlipidemia        Rhythm: regular  Rate: normal                    Neuro/Psych:   (+) psychiatric history:             ROS comment: RLS  H/o pituitary tumor resection 20 years ago GI/Hepatic/Renal:   (+) GERD:,           Endo/Other:    (+) hypothyroidism: arthritis: OA., .                 Abdominal:           Vascular:                                      Anesthesia Plan      general     ASA 3 (With prior surgery pt had to return to hospital (hypotension and shock) due to not receiving stress dose steroids in the periop period. Will give 100 mg hydrocortisone)  Induction: intravenous. MIPS: Prophylactic antiemetics administered. Anesthetic plan and risks discussed with patient. Plan discussed with CRNA.                 Sapphire Renner DO   8/11/2020

## 2020-08-11 NOTE — PROGRESS NOTES
Spoke with Dr Jessica Townsend, one time dose Tylenol ordered. Anesthesia updated with plan for alternative treatment.

## 2020-08-11 NOTE — PROGRESS NOTES
PACU Transfer to hospitals  Pt's Current Allergies: Sulfa antibiotics; Codeine; Demerol; and Nsaids    Pt meets criteria to transfer to next phase of care per DAMEON SCORE and ASPAN standards        Vitals:    08/11/20 1445   BP: (!) 147/76   Pulse: 66   Resp: 9   Temp: 97.2 °F (36.2 °C)   SpO2: 100%      BP within 20% of pt's admitting BP as per Dameon Score      Intake/Output Summary (Last 24 hours) at 8/11/2020 1453  Last data filed at 8/11/2020 1445  Gross per 24 hour   Intake 2060 ml   Output 500 ml   Net 1560 ml       Asked patient if needed to use the bathroom prior to transfer to hospitals. Pt attempted to void per bedpan, unable to void. Pain assessment:  present - adequately treated  Pain Level: 3      Patient transferred to care of hospitals RN.   Family updated and directed to [facility]    8/11/2020 2:53 PM

## 2020-08-14 LAB — SARS-COV-2, NAA: NOT DETECTED

## 2020-08-14 NOTE — RESULT ENCOUNTER NOTE
Your test for COVID-19, also known as novel coronavirus, came back negative. No virus was detected from the sample collected. Testing is not 100%. Until your symptoms are fully resolved, you may still be contagious. We recommend that you remain isolated for 7 days minimum or 72 hours after your symptoms have completely resolved, whichever is longer. If you were exposed to a known positive COVID-19 patient, then you must remain isolated for 14 days. If you were tested for a pre-op, then you remain in isolated until your procedure. Continually monitor symptoms. Contact a medical provider if symptoms are worsening. If you have any additional questions, contact your PCP.     For additional information, please visit the Centers for Disease Control and Prevention [email or web address]cy

## 2020-08-27 ENCOUNTER — OFFICE VISIT (OUTPATIENT)
Dept: BARIATRICS/WEIGHT MGMT | Age: 75
End: 2020-08-27

## 2020-08-27 VITALS
SYSTOLIC BLOOD PRESSURE: 100 MMHG | HEART RATE: 86 BPM | DIASTOLIC BLOOD PRESSURE: 80 MMHG | WEIGHT: 152.2 LBS | TEMPERATURE: 97.3 F | BODY MASS INDEX: 29.72 KG/M2

## 2020-08-27 PROCEDURE — 99024 POSTOP FOLLOW-UP VISIT: CPT | Performed by: SURGERY

## 2020-08-28 NOTE — PROGRESS NOTES
Patient doing well  No N/V/F/C  Tolerating diet  Having regular BM's    Incisions C/D/I    S/P robotic incisional hernia repair    F/U PRN     Salvage

## 2020-11-05 RX ORDER — ATORVASTATIN CALCIUM 20 MG/1
TABLET, FILM COATED ORAL
Qty: 90 TABLET | Refills: 0 | Status: SHIPPED | OUTPATIENT
Start: 2020-11-05 | End: 2021-02-04

## 2020-11-05 RX ORDER — OMEPRAZOLE 40 MG/1
CAPSULE, DELAYED RELEASE ORAL
Qty: 90 CAPSULE | Refills: 0 | Status: SHIPPED | OUTPATIENT
Start: 2020-11-05 | End: 2021-02-04

## 2020-11-05 NOTE — TELEPHONE ENCOUNTER
Last OV 8/10/20  Please advise  Thank you      Ref.  Range 4/10/2018 09:13   Cholesterol, Total Latest Ref Range: 0 - 199 mg/dL 154   HDL Cholesterol Latest Ref Range: 40 - 60 mg/dL 58   LDL Calculated Latest Ref Range: <100 mg/dL 61   Triglycerides Latest Ref Range: 0 - 150 mg/dL 175 (H)   VLDL Cholesterol Calculated Latest Ref Range: Not Established mg/dL 35

## 2020-11-05 NOTE — TELEPHONE ENCOUNTER
Okay to schedule nurse visit for irrigation or would you like to see pt in office first  Please advise  Thank you     ----- Message from Kenyatta Vásquez sent at 11/5/2020 10:28 AM EST -----  Subject: Message to Provider    QUESTIONS  Information for Provider? Pt. would like to know if she can have her ears   (wax) cleaned out at the office tomorrow when she comes to the lab for   blood work please advise and call pt. back to confirm. Q  ---------------------------------------------------------------------------  --------------  CALL BACK INFO  What is the best way for the office to contact you? OK to leave message on   voicemail  Preferred Call Back Phone Number? [phone]  ---------------------------------------------------------------------------  --------------  SCRIPT ANSWERS  Relationship to Patient?  Self

## 2020-11-09 NOTE — PROGRESS NOTES
Subjective:      Patient ID: Ronny Runner 77 y.o. female. is here for evaluation for ear fullness. [facility] complains of fullness in her ears; left for a month or two. Right just this. She had an episode of light headedness and feeling that she was going to faint in the shower this am.  Had not had anything to eat or drink and took a hot shower. Got out and felt fine. No LOC. Does not describe vertigo. The hearing is blunted and is having a static sound in her left ear. No pain or discharge. Rx: Debrox did not help. 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 Has appt with her this week.            Outpatient Medications Marked as Taking for the 11/10/20 encounter (Office Visit) with Edouard Funez MD   Medication Sig Dispense Refill    omeprazole (PRILOSEC) 40 MG delayed release capsule TAKE ONE CAPSULE BY MOUTH DAILY 90 capsule 0    atorvastatin (LIPITOR) 20 MG tablet TAKE ONE TABLET BY MOUTH DAILY 90 tablet 0    Cholecalciferol (VITAMIN D3) 50 MCG (2000 UT) CAPS Take 1,000 Units by mouth daily      levothyroxine (SYNTHROID) 100 MCG tablet       CALCIUM PO Take by mouth      predniSONE (DELTASONE) 5 MG tablet Take 5 mg by mouth daily          Allergies   Allergen Reactions    Sulfa Antibiotics      anaphylaxis    Codeine      vomiting    Demerol      nausea    Nsaids Itching and Dermatitis     States that all NSAIDS cause severe itching       Patient Active Problem List   Diagnosis    Hypopituitarism ([facility]    Hyperlipemia    Osteoporosis    Reactive depression (situational)    Acromegaly and gigantism (HCC)    Hypothyroidism    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HCC)    Glucocorticoid deficiency (HCC)    Vitamin D deficiency    Osteopenia    Nausea & vomiting    Hiatal hernia    Elevated liver enzymes    Need for shingles vaccine    Anemia       Past Medical History:   Diagnosis Date    Anxiety     Depression     GERD (gastroesophageal reflux disease)  History of blood transfusion     Hypothyroidism     Osteoporosis     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    Restless legs syndrome (RLS)     Wears glasses        Past Surgical History:   Procedure Laterality Date    CARPAL TUNNEL RELEASE Bilateral     CHOLECYSTECTOMY  05/13/2013    DIAGNOSTIC LAPAROSCOPY,LIGATION OF RIGHT ROUND    HERNIA REPAIR N/A 8/11/2020    ROBOTIC INCISIONAL INCARCERATED HERNIA REPAIR WITH MESH performed by Rebecca Mcnamara DO at [address] Bilateral     2006 & 2007    PITUITARY EXCISION  1990 5/6 removed - Dr Mary Hoffman        Family History   Problem Relation Age of Onset    No Known Problems Mother     No Known Problems Father     No Known Problems Maternal Grandmother     No Known Problems Maternal Grandfather     No Known Problems Paternal Grandmother     No Known Problems Paternal Grandfather     No Known Problems Sister     No Known Problems Brother     No Known Problems Paternal Aunt     No Known Problems Paternal Uncle     No Known Problems Maternal Aunt     No Known Problems Maternal Uncle     No Known Problems Other     Anesth Problems Neg Hx     Malig Hyperten Neg Hx     Hypotension Neg Hx     Malig Hypertherm Neg Hx     Pseudochol. Deficiency Neg Hx        Social History     Tobacco Use    Smoking status: Never Smoker    Smokeless tobacco: Never Used   Substance Use Topics    Alcohol use: No    Drug use: No            Review of Systems  Review of Systems    Objective:   Physical Exam  Vitals:    11/10/20 1458   BP: 98/77   Pulse: 64   Resp: 14   Temp: 97.2 °F (36.2 °C)   TempSrc: Temporal   Weight: 160 lb (72.6 kg)       Physical Exam  NAD    Skin is warm and dry. No rash. Well hydrated  TM/EAC clear and TM is mobile on the left. TM is occluded by cerumen on the right. Cardiovascular: PMI is not displaced, and no thrill noted. Regular rate and rhythm with no rub, murmur or gallop. No peripheral edema.

## 2020-11-10 ENCOUNTER — OFFICE VISIT (OUTPATIENT)
Dept: FAMILY MEDICINE CLINIC | Age: 75
End: 2020-11-10
Payer: MEDICARE

## 2020-11-10 VITALS
RESPIRATION RATE: 14 BRPM | DIASTOLIC BLOOD PRESSURE: 77 MMHG | WEIGHT: 160 LBS | TEMPERATURE: 97.2 F | SYSTOLIC BLOOD PRESSURE: 98 MMHG | BODY MASS INDEX: 31.25 KG/M2 | HEART RATE: 64 BPM

## 2020-11-10 PROCEDURE — 99213 OFFICE O/P EST LOW 20 MIN: CPT | Performed by: FAMILY MEDICINE

## 2020-11-10 PROCEDURE — 69210 REMOVE IMPACTED EAR WAX UNI: CPT | Performed by: FAMILY MEDICINE

## 2020-11-19 ENCOUNTER — TELEPHONE (OUTPATIENT)
Dept: FAMILY MEDICINE CLINIC | Age: 75
End: 2020-11-19

## 2020-11-19 NOTE — TELEPHONE ENCOUNTER
Mayito Dixon called stating the spray that Dr. Juanita Wild prescribed to help with her left ear is not working. She still cannot hear out of it. She called to schedule with Dr. Shanthi Lopez but they need a referral.  She would like to know if a referral can be placed in Epic. Please advise. Thanks.         Mayito Dixon [phone] (home)

## 2020-12-01 NOTE — PROGRESS NOTES
Jessica Sanchez   1945, 77 y.o. female   [id number]       Referring Provider: Ramonita Pizano MD, PhD  Referral Type: In an order in [address] [place] [address]    Reason for Visit: Evaluation of suspected change in hearing, tinnitus, or balance. ADULT AUDIOLOGIC EVALUATION      Jessica Sanchez is a 77 y.o. female seen today, 12/7/2020 for an initial audiologic evaluation. Temperature taken upon entering office and was in acceptable range. AUDIOLOGIC AND OTHER PERTINENT MEDICAL HISTORY:        Jessica Sanchez noted concern for left ear tinnitus (static sound), ear pressure (clogged sensation), and decreased hearing for about 6 months, started after washing her hair, felt like she had water or wax stuck in her ear; no concerns for right ear; can feel imbalanced at times, comes and goes; family history of hearing loss - father, he had occupational noise exposure. Jessica Sanchez denied otalgia, otorrhea, history of occupational/recreational noise exposure, history of head trauma, and history of ear surgery. IMPRESSIONS:       Today's results are consistent with left ear sensorineural hearing loss with right ear within normal limits to mild high frequency sensorineural hearing loss. Hearing loss is significant enough to result in difficulty understanding speech in most listening environments. Discussed good communication strategies, tinnitus management strategies, and recommended hearing aid for left ear. ASSESSMENT AND FINDINGS:       Otoscopy revealed: Clear ear canals bilaterally      RIGHT EAR:  Hearing Sensitivity: Within normal limits through 3000 sloping to mild high frequency sensorineural hearing loss. Speech Recognition Threshold: 15 dB HL  Word Recognition: Excellent (100%), based on NU-6 25-word list at 50 dBHL using recorded speech stimuli. This finding is consistent with hearing sensitivity.   Tympanometry: Normal peak pressure and compliance, Type A tympanogram, consistent with normal middle ear function. LEFT EAR:  Hearing Sensitivity: Mild to moderate through 2000 Hz precipitously sloping to severe sensorineural hearing loss. Speech Recognition Threshold: 50 dB HL, masked  Word Recognition: Good (80%), based on NU-6 25-word list at 75 dBHL, masked, using recorded speech stimuli. This finding is consistent with hearing sensitivity. Tympanometry: Normal peak pressure and compliance, Type A tympanogram, consistent with normal middle ear function. NOTE: An asymmetry of 20-60 dBHL is present across all frequencies tested with left ear worse than right ear; there is also a clinically significant difference in word recognition with left ear (80%) worse than right ear (100%). Reliability: Good  Transducer: Inserts    See scanned audiogram dated 12/7/2020 for results. PATIENT EDUCATION:       The following items were discussed with the patient:    - Good Communication Strategies   - Hearing Loss and Hearing Aids    - Tinnitus Management Strategies    Educational information was shared in the After Visit Summary. RECOMMENDATIONS:                                                                                                                                                                                                                                                                      The following items are recommended based on patient report and results from today's appointment:   - Continue medical follow-up with Lupe Wood MD, PhD.   - Jose Guadalupe Opal hearing as medically indicated and/or sooner if a change in hearing is noted. - Pending medical clearance, if desired, schedule a Hearing Aid Evaluation (HAE) appointment to discuss hearing aid options. - Utilize \"Good Communication Strategies\" as discussed to assist in speech understanding with communication partners.     - Maintain a sound enriched environment to assist in the management of tinnitus symptoms.            [address] [place]  Audiologist      Chart CC'd to: Lyly Munguia MD, PhD      Degree of   Hearing Sensitivity dB Range   Within Normal Limits (WNL) 0 - 20   Mild 20 - 40   Moderate 40 - 55   Moderately-Severe 55 - 70   Severe 70 - 90   Profound 90 +

## 2020-12-07 ENCOUNTER — OFFICE VISIT (OUTPATIENT)
Dept: ENT CLINIC | Age: 75
End: 2020-12-07
Payer: MEDICARE

## 2020-12-07 ENCOUNTER — PROCEDURE VISIT (OUTPATIENT)
Dept: AUDIOLOGY | Age: 75
End: 2020-12-07
Payer: MEDICARE

## 2020-12-07 VITALS
TEMPERATURE: 96.9 F | HEIGHT: 60 IN | DIASTOLIC BLOOD PRESSURE: 85 MMHG | BODY MASS INDEX: 31.41 KG/M2 | HEART RATE: 75 BPM | SYSTOLIC BLOOD PRESSURE: 134 MMHG | WEIGHT: 160 LBS

## 2020-12-07 PROCEDURE — 92557 COMPREHENSIVE HEARING TEST: CPT | Performed by: AUDIOLOGIST

## 2020-12-07 PROCEDURE — 99203 OFFICE O/P NEW LOW 30 MIN: CPT | Performed by: OTOLARYNGOLOGY

## 2020-12-07 PROCEDURE — 92567 TYMPANOMETRY: CPT | Performed by: AUDIOLOGIST

## 2020-12-07 ASSESSMENT — ENCOUNTER SYMPTOMS
EYE DISCHARGE: 0
STRIDOR: 0
CHOKING: 0
EYE PAIN: 0
BLOOD IN STOOL: 0
RHINORRHEA: 0
FACIAL SWELLING: 0
SINUS PAIN: 0
CHEST TIGHTNESS: 0
COUGH: 0
SINUS PRESSURE: 0
WHEEZING: 0
BACK PAIN: 0
SORE THROAT: 0
VOMITING: 0
TROUBLE SWALLOWING: 0
DIARRHEA: 0
SHORTNESS OF BREATH: 0
VOICE CHANGE: 0
COLOR CHANGE: 0
NAUSEA: 0
APNEA: 0
CONSTIPATION: 0

## 2020-12-07 NOTE — PROGRESS NOTES
Subjective:      Patient ID: Abby Eller is a 77 y.o. female. HPI  Hearing Loss HPI  CC: hearing loss    General: Jerrod Coleman is a(n) 77 y.o. female who presents with a 6 week hsisory of left sided hearing loss. Noticed after shower. Static in left ear. Though it was water. Persisted. No change since. No vertigo or loud tinnitus. Denies recent cold or flu. Denies acoustic trauma.    How lon weeks  Side:left  Prior audiogram:No  Previous episodes: no  Tinnitus:Yes  Otorrhea:No  Vertigo:No  Prior ear surgery: No  Ear trauma: No  Ear problems as child: No  Denies other medical problems including: birth anoxia, kidney disease, poor vision or progressive vision loss, syncope, thyroid problems  Family Hx of hearing loss:No  Denies family history of: thyroid disease, renal disease, syncope, autoimmune disease, hearing loss at a young age, progressive hearing loss    Patient Active Problem List   Diagnosis    Hypopituitarism ([address]    Hyperlipemia    Osteoporosis    Reactive depression (situational)    Acromegaly and gigantism ([address]    Hypothyroidism    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[address]    Glucocorticoid deficiency ([address]    Vitamin D deficiency    Osteopenia    Nausea & vomiting    Hiatal hernia    Elevated liver enzymes    Need for shingles vaccine    Anemia     Past Surgical History:   Procedure Laterality Date    CARPAL TUNNEL RELEASE Bilateral     CHOLECYSTECTOMY  2013    DIAGNOSTIC LAPAROSCOPY,LIGATION OF RIGHT ROUND    HERNIA REPAIR N/A 2020    ROBOTIC INCISIONAL INCARCERATED HERNIA REPAIR WITH MESH performed by Fausto Fish DO at [facility] 15 Bilateral      &    [address] [place] [address]     removed - Dr Osbaldo Jay     Family History   Problem Relation Age of Onset    No Known Problems Mother     No Known Problems Father     No Known Problems Maternal Grandmother     No Known Problems Maternal Grandfather     No Known Problems Paternal Grandmother     No Known Authorizing Provider   budesonide (RHINOCORT AQUA) 32 MCG/ACT nasal spray 2 sprays by Each Nostril route 2 times daily 11/10/20   Zack Perrin MD   omeprazole (PRILOSEC) 40 MG delayed release capsule TAKE ONE CAPSULE BY MOUTH DAILY 11/5/20   Zack Perrin MD   atorvastatin (LIPITOR) 20 MG tablet TAKE ONE TABLET BY MOUTH DAILY 11/5/20   Zack Perrin MD   Cholecalciferol (VITAMIN D3) 50 MCG (2000 UT) CAPS Take 1,000 Units by mouth daily    Historical Provider, MD   levothyroxine (SYNTHROID) 100 MCG tablet  8/29/18   Historical Provider, MD   CALCIUM PO Take by mouth    Historical Provider, MD   predniSONE (DELTASONE) 5 MG tablet Take 5 mg by mouth daily    Historical Provider, MD       Review of Systems   Constitutional: Negative for activity change, appetite change, chills, fatigue and fever. HENT: Negative for congestion, dental problem, drooling, ear discharge, ear pain, facial swelling, hearing loss, mouth sores, nosebleeds, postnasal drip, rhinorrhea, sinus pressure, sinus pain, sneezing, sore throat, tinnitus, trouble swallowing and voice change. Eyes: Negative for pain, discharge and visual disturbance. Respiratory: Negative for apnea, cough, choking, chest tightness, shortness of breath, wheezing and stridor. Cardiovascular: Negative for palpitations. Gastrointestinal: Negative for blood in stool, constipation, diarrhea, nausea and vomiting. Endocrine: Negative for cold intolerance, heat intolerance, polydipsia, polyphagia and polyuria. Musculoskeletal: Negative for back pain, gait problem, neck pain and neck stiffness. Skin: Negative for color change, pallor, rash and wound. Allergic/Immunologic: Negative for environmental allergies, food allergies and immunocompromised state. Neurological: Negative for dizziness, facial asymmetry, speech difficulty, light-headedness, numbness and headaches. Hematological: Negative for adenopathy. Does not bruise/bleed easily. midline. No oropharyngeal exudate, posterior oropharyngeal erythema or uvula swelling. Tonsils: No tonsillar abscesses. Eyes:      General: Lids are normal. Lids are everted, no foreign bodies appreciated. Right eye: No discharge. Left eye: No discharge. Extraocular Movements:      Right eye: Normal extraocular motion and no nystagmus. Left eye: Normal extraocular motion and no nystagmus. Conjunctiva/sclera:      Right eye: No chemosis or exudate. Left eye: No chemosis or exudate. Neck:      Musculoskeletal: Neck supple. Thyroid: No thyroid mass or thyromegaly. Vascular: Normal carotid pulses. Trachea: Trachea normal. No tracheal deviation. Cardiovascular:      Rate and Rhythm: Normal rate and regular rhythm. Pulmonary:      Effort: No tachypnea, bradypnea or respiratory distress. Breath sounds: No stridor. Musculoskeletal:      Right shoulder: She exhibits normal range of motion. Left shoulder: She exhibits normal range of motion. Lymphadenopathy:      Head:      Right side of head: No submental, submandibular, tonsillar, preauricular, posterior auricular or occipital adenopathy. Left side of head: No submental, submandibular, tonsillar, preauricular, posterior auricular or occipital adenopathy. Cervical:      Right cervical: No superficial, deep or posterior cervical adenopathy. Left cervical: No superficial, deep or posterior cervical adenopathy. Skin:     Findings: No bruising, erythema, laceration or lesion. Neurological:      Mental Status: She is alert and oriented to person, place, and time. Psychiatric:         Speech: Speech normal.         Behavior: Behavior normal.       Patient here for audiology results. Explained audiogram to patient and discussed findings in light of symptoms. Answered all patient questions and formulated treatment plan. Left mild to moderate SNHL with decreased discrimination.  Normal tymps. Please see Plan section for further details. Assessment:       Diagnosis Orders   1. Sensorineural hearing loss (SNHL) of left ear with unrestricted hearing of right ear  MRI IAC POSTERIOR FOSSA W WO CONTRAST   2. Tinnitus of left ear             Plan:      Asymmetric hearing loss of unknown origin. MRI IAC with and without to identify possible retrocochlear pathology. If negative recommend left sided hearing aid to imrpove sound localization and vocal identity. Repeat audio in one year.            Nicole Sheppard MD

## 2020-12-07 NOTE — Clinical Note
Dr. Cash Garrett,    Please see note from this patient's audiogram from today. Please let me know if there is anything further you need.       Angela Party [id number] [place]  Audiologist

## 2020-12-07 NOTE — PATIENT INSTRUCTIONS
Good Communication Strategies    Communication can be challenging for anyone, but can be especially difficult for those with some degree of hearing loss. While we may not be able to control every factor that may lead to difficulty with communication, there are Good Communication Strategies that we can all use in our day-to-day lives. Communication takes both parties working together for it to be successful. Tips as a Listener:   1. Control your environment. It is important to limit the amount of background noise in the room when possible. You should also consider having a good light source in the room to best see the other person. 2. Ask for clarification. Instead of saying \"What?\", you can use parts of what you heard to make a new question. For example, if you heard the word \"Thursday\" but not the rest of the week, you may ask \"What was that about Thursday? \" or \"What did you want to do Thursday? \". This shows the person talking that you are listening and will help them better explain what they are saying. 3. Be an advocate for yourself. If you are hearing but not understanding, tell the other person \"I can hear you, but I need you to slow down when you speak. \"  Or if someone is facing the other direction, say \"I cannot hear you when you are not looking at me when we talk. \"       Tips as a Talker:   - Sit or stand 3 to 6 feet away to maximize audibility         -- It is unrealistic to believe someone else will fully hear your message if you are speaking from across the room or in a different room in the house   - Stay at eye level to help with visual cues   - Make sure you have the persons attention before speaking   - Use facial expressions and gestures to accentuate your message   - Raise your voice slightly (do not scream)   - Speak slowly and distinctly   - Use short, simple sentences   - Rephrase your words if the person is having a hard time understanding you    - To avoid distortion, dont speak If it goes on all the time, you may have tinnitus. Tinnitus is usually caused by long-term exposure to loud noise. This damages the nerves in the inner ear. It can occur with all types of hearing loss. It may be a symptom of almost any ear problem. Tinnitus may be caused by a buildup of earwax. Or, it may be caused by ear infections or certain medicines (especially antibiotics or large amounts of aspirin). You can also hear noises in your ears because of an injury to the ears, drinking too much alcohol or caffeine, or a medical condition. Other conditions may also contribute to tinnitus, including: head and neck trauma, temporomandibular joint disorder (TMJ), sinus pressure and barometric trauma, traumatic brain injury, metabolic disorders, autoimmune disorders, stress, and high blood pressure. You may need tests to evaluate your hearing and to find causes of long-lasting tinnitus. Your doctor may suggest one or more treatments to help you cope with the tinnitus. You can also do things at home to help reduce symptoms. Follow-up care is a key part of your treatment and safety. Be sure to make and go to all appointments, and call your doctor if you are having problems. It's also a good idea to know your test results and keep a list of the medicines you take. How can you care for yourself at home? · Limit or cut out alcohol, caffeine, and sodium. They can make your symptoms worse. · Do not smoke or use other tobacco products. Nicotine reduces blood flow to the ear and makes tinnitus worse. If you need help quitting, talk to your doctor about stop-smoking programs and medicines. These can increase your chances of quitting for good. · Talk to your doctor about whether to stop taking aspirin and similar products such as ibuprofen or naproxen. · Get exercise often. It can help improve blood flow to the ear. Ways to manage/cope with tinnitus  Some tinnitus may last a long time.  To manage your tinnitus, try to:  · Avoid noises that you think caused your tinnitus. If you can't avoid loud noises, wear earplugs or earmuffs. · Ignore the sound by paying attention to other things. Keeping your brain busy with other tasks or background noise can help your brain not focus on the tinnitus. · Try to not give the tinnitus an emotional reaction. Do your best to ignore the sound and not let it bother you. Relax using biofeedback, meditation, or yoga. Feeling stressed and being tired can make tinnitus worse. · Play music or white noise to help you sleep. Background noise may cover up the noise that you hear in your ears. You can buy a tabletop machine or a device that sits under your pillow to play soothing sounds, like ocean waves. · Smart phones have free apps, such as Whist, Relax Melodies, ReSound Relief, and White Noise Lite. These apps have different types of sounds/noise, some of which you can blend together to find sounds that are most soothing to you. · Hearing aid technology, especially when there is some hearing loss, may help reduce tinnitus symptoms by giving your brain better access to the sounds it is missing. There are some hearing aids with built-in noise generator programs, which may help when amplification alone is not enough. Additional resources may be found through the [employer] at www.betty.org    When should you call for help? Call 911 anytime you think you may need emergency care. For example, call if:    · You have symptoms of a stroke. These may include:  ? Sudden numbness, tingling, weakness, or loss of movement in your face, arm, or leg, especially on only one side of your body. ? Sudden vision changes. ? Sudden trouble speaking. ? Sudden confusion or trouble understanding simple statements. ? Sudden problems with walking or balance. ? A sudden, severe headache that is different from past headaches.     Call your doctor now or seek immediate medical care if:    · You develop other symptoms. These may include hearing loss (or worse hearing loss), balance problems, dizziness, nausea, or vomiting. Watch closely for changes in your health, and be sure to contact your doctor if:    · Your tinnitus moves from both ears to one ear. · Your hearing loss gets worse within 1 day after an ear injury. · Your tinnitus or hearing loss does not get better within 1 week after an ear injury. · Your tinnitus bothers you enough that you want to take medicines to help you cope with it. If you notice changes in your tinnitus and/or your hearing, it is recommended that you have your hearing tested by your audiologist and to follow-up with your physician that manages your hearing loss (such as your ENT or Primary Care doctor). Hearing Loss: Care Instructions  Your Care Instructions      Hearing loss is a sudden or slow decrease in how well you hear. It can range from mild to profound. Permanent hearing loss can occur with aging, and it can happen when you are exposed long-term to loud noise. Examples include listening to loud music, riding motorcycles, or being around other loud machines. Hearing loss can affect your work and home life. It can make you feel lonely or depressed. You may feel that you have lost your independence. But hearing aids and other devices can help you hear better and feel connected to others. Follow-up care is a key part of your treatment and safety. Be sure to make and go to all appointments, and call your doctor if you are having problems. It's also a good idea to know your test results and keep a list of the medicines you take. How can you care for yourself at home? · Avoid loud noises whenever possible. This helps keep your hearing from getting worse. Always wear hearing protection around loud noises. · If appropriate, wear hearing aid(s) as directed.   It is recommended that hearing aids are worn during all waking hours to keep getting worse. · You have new symptoms, such as dizziness or nausea.

## 2021-02-04 DIAGNOSIS — E78.00 PURE HYPERCHOLESTEROLEMIA: ICD-10-CM

## 2021-02-04 RX ORDER — ATORVASTATIN CALCIUM 20 MG/1
TABLET, FILM COATED ORAL
Qty: 90 TABLET | Refills: 1 | Status: SHIPPED | OUTPATIENT
Start: 2021-02-04 | End: 2022-02-16

## 2021-02-04 RX ORDER — OMEPRAZOLE 40 MG/1
CAPSULE, DELAYED RELEASE ORAL
Qty: 90 CAPSULE | Refills: 1 | Status: SHIPPED | OUTPATIENT
Start: 2021-02-04 | End: 2022-02-21

## 2021-02-04 NOTE — TELEPHONE ENCOUNTER
Last OV 11/10/20  Please advise  Thank you      Ref.  Range 4/10/2018 09:13   Cholesterol, Total Latest Ref Range: 0 - 199 mg/dL 154   HDL Cholesterol Latest Ref Range: 40 - 60 mg/dL 58   LDL Calculated Latest Ref Range: <100 mg/dL 61   Triglycerides Latest Ref Range: 0 - 150 mg/dL 175 (H)   VLDL Cholesterol Calculated Latest Ref Range: Not Established mg/dL 35

## 2021-08-24 PROBLEM — Z23 NEED FOR SHINGLES VACCINE: Status: RESOLVED | Noted: 2018-04-09 | Resolved: 2021-08-24

## 2021-08-25 ENCOUNTER — OFFICE VISIT (OUTPATIENT)
Dept: FAMILY MEDICINE CLINIC | Age: 76
End: 2021-08-25
Payer: MEDICARE

## 2021-08-25 VITALS
BODY MASS INDEX: 31.68 KG/M2 | TEMPERATURE: 96.8 F | WEIGHT: 162.2 LBS | HEART RATE: 57 BPM | OXYGEN SATURATION: 98 % | SYSTOLIC BLOOD PRESSURE: 124 MMHG | DIASTOLIC BLOOD PRESSURE: 78 MMHG | RESPIRATION RATE: 12 BRPM

## 2021-08-25 DIAGNOSIS — E27.49 GLUCOCORTICOID DEFICIENCY (HCC): ICD-10-CM

## 2021-08-25 DIAGNOSIS — R11.2 INTRACTABLE VOMITING WITH NAUSEA, UNSPECIFIED VOMITING TYPE: Primary | ICD-10-CM

## 2021-08-25 DIAGNOSIS — R73.01 ELEVATED FASTING BLOOD SUGAR: ICD-10-CM

## 2021-08-25 DIAGNOSIS — E22.0 ACROMEGALY AND GIGANTISM (HCC): ICD-10-CM

## 2021-08-25 DIAGNOSIS — E78.00 PURE HYPERCHOLESTEROLEMIA: ICD-10-CM

## 2021-08-25 DIAGNOSIS — R74.8 ELEVATED LIVER ENZYMES: ICD-10-CM

## 2021-08-25 DIAGNOSIS — Z91.81 AT HIGH RISK FOR FALLS: ICD-10-CM

## 2021-08-25 DIAGNOSIS — R25.1 TREMOR: ICD-10-CM

## 2021-08-25 DIAGNOSIS — E23.0 PANHYPOPITUITARISM (HCC): ICD-10-CM

## 2021-08-25 DIAGNOSIS — K44.9 HIATAL HERNIA: ICD-10-CM

## 2021-08-25 PROCEDURE — 99214 OFFICE O/P EST MOD 30 MIN: CPT | Performed by: FAMILY MEDICINE

## 2021-08-25 RX ORDER — PANTOPRAZOLE SODIUM 40 MG/1
40 TABLET, DELAYED RELEASE ORAL DAILY
Qty: 30 TABLET | Refills: 5 | Status: SHIPPED | OUTPATIENT
Start: 2021-08-25 | End: 2022-02-21 | Stop reason: SDUPTHER

## 2021-08-25 NOTE — PROGRESS NOTES
Subjective:      Patient ID: Martín Gomez 77 y.o. female. The primary encounter diagnosis was Acromegaly and gigantism ([address] Diagnoses of Glucocorticoid deficiency ([address] Elevated liver enzymes, Pure hypercholesterolemia, Hiatal hernia, and 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[address] were also pertinent to this visit. HPI    Acromegaly, glucocorticoid deficiency, panhypopituitarism:  Has not had labs done or seen endocrinology since July 2020    She has had heartburn on and off x several months. Omeprazole did not help much so started to take to it just occasionally. The heart bur   5 days heartburn, nausea vomiting. This is a recurrent problem  Emesis occurs after eating. No abdominal pain. Alternates constipation or diarrhea. Mild constipation before onset of sxs. No dalila or hematochezia. .  This is more severe than previous flares. She has not had emesis since improved diet - eating oatmeal, cooked vegetables. Drinking plenty of fluids. Still has heartburn but much less severe. Her bowels are back to daily x few days. She has not lost any weight. Has not been taking Prilosec regularly. Complaints of significant fatigue x over one year. She did not have labs done last year due to Alan. Had endoscopy and colonoscopy 4/27/18 - normal except hiatal hernia. Hs tremor right. interferes with writing, eating.  Mom had head tremor      Lab Results   Component Value Date     04/10/2018    K 4.0 04/10/2018     04/10/2018    CO2 24 04/10/2018    BUN 13 04/10/2018    CREATININE 0.9 04/10/2018    GLUCOSE 123 (H) 04/10/2018    CALCIUM 9.0 04/10/2018    PROT 6.7 04/10/2018    LABALBU 4.0 04/10/2018    BILITOT 0.3 04/10/2018    ALKPHOS 96 04/10/2018    AST 11 (L) 04/10/2018    ALT 9 (L) 04/10/2018    LABGLOM >60 04/10/2018    GFRAA >60 04/10/2018    AGRATIO 1.5 04/10/2018    GLOB 2.7 04/10/2018        Lab Results   Component Value Date    WBC 10.1 07/10/2019    HGB 14.7 07/10/2019    HCT 45.2 07/10/2019    MCV 93.1 07/10/2019     07/10/2019     TSH   Date Value Ref Range Status   04/10/2018 <0.01 (L) 0.27 - 4.20 uIU/mL Final   06/23/2015 0.01 uIU/mL Final   10/17/2013 0.01 uIU/mL Final   08/28/2013 <0.01 (L) 0.35 - 5.50 uIU/mL Final   03/18/2013 0.06 (L) 0.35 - 5.5 uIU/ml Final   03/20/2012 <0.01 (L) 0.35 - 5.5 uIU/ml Final   05/24/2011 <0.01 (L) 0.35 - 5.5 uIU/ml Final     Lab Results   Component Value Date    CHOL 154 04/10/2018    CHOL 205 (H) 03/18/2013    CHOL 178 03/20/2012     Lab Results   Component Value Date    TRIG 175 (H) 04/10/2018    TRIG 128 03/18/2013    TRIG 103 03/20/2012     Lab Results   Component Value Date    HDL 58 04/10/2018    HDL 63 (H) 03/18/2013    HDL 66 (H) 03/20/2012     Lab Results   Component Value Date    LDLCALC 61 04/10/2018    LDLCALC 117 (H) 03/18/2013    LDLCALC 91 03/20/2012     Lab Results   Component Value Date    LABVLDL 35 04/10/2018    LABVLDL 26 03/18/2013    LABVLDL 21 03/20/2012     Lab Results   Component Value Date    CHOLHDLRATIO 4.1 12/02/2010          Outpatient Medications Marked as Taking for the 8/25/21 encounter (Office Visit) with Elkin Vieira MD   Medication Sig Dispense Refill    omeprazole (PRILOSEC) 40 MG delayed release capsule TAKE ONE CAPSULE BY MOUTH DAILY 90 capsule 1    atorvastatin (LIPITOR) 20 MG tablet TAKE ONE TABLET BY MOUTH DAILY 90 tablet 1    budesonide (RHINOCORT AQUA) 32 MCG/ACT nasal spray 2 sprays by Each Nostril route 2 times daily 8.6 g 2    Cholecalciferol (VITAMIN D3) 50 MCG (2000 UT) CAPS Take 1,000 Units by mouth daily      levothyroxine (SYNTHROID) 100 MCG tablet       CALCIUM PO Take by mouth      predniSONE (DELTASONE) 5 MG tablet Take 5 mg by mouth daily          Allergies   Allergen Reactions    Sulfa Antibiotics      anaphylaxis    Codeine      vomiting    Demerol      nausea    Flurbiprofen Itching    Nsaids Itching and Dermatitis     States that all NSAIDS cause severe itching Patient Active Problem List   Diagnosis    [facility]    Hyperlipemia    Osteoporosis    Reactive depression (situational)    Acromegaly and gigantism ([facility]    Hypothyroidism    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[facility]    Glucocorticoid deficiency ([facility]    Vitamin D deficiency    Osteopenia    Nausea & vomiting    Hiatal hernia    Elevated liver enzymes    Anemia       Past Medical History:   Diagnosis Date    Anxiety     Depression     GERD (gastroesophageal reflux disease)     History of blood transfusion     Hypothyroidism     Osteoporosis     Pituitary abnormality ([facility]     Benign Tumor - acromegaly    Restless legs syndrome (RLS)     Wears glasses        Past Surgical History:   Procedure Laterality Date    CARPAL TUNNEL RELEASE Bilateral     CHOLECYSTECTOMY  05/13/2013    DIAGNOSTIC LAPAROSCOPY,LIGATION OF RIGHT ROUND    HERNIA REPAIR N/A 8/11/2020    ROBOTIC INCISIONAL INCARCERATED HERNIA REPAIR WITH MESH performed by Neli Ledesma DO at [place] [address] Bilateral     2006 & 2007    PITUITARY EXCISION  1990 5/6 removed - Dr Galindo [place]        Family History   Problem Relation Age of Onset    No Known Problems Mother     No Known Problems Father     No Known Problems Maternal Grandmother     No Known Problems Maternal Grandfather     No Known Problems Paternal Grandmother     No Known Problems Paternal Grandfather     No Known Problems Sister     No Known Problems Brother     No Known Problems Paternal Aunt     No Known Problems Paternal Uncle     No Known Problems Maternal Aunt     No Known Problems Maternal Uncle     No Known Problems Other     Anesth Problems Neg Hx     Malig Hyperten Neg Hx     Hypotension Neg Hx     Malig Hypertherm Neg Hx     Pseudochol.  Deficiency Neg Hx        Social History     Tobacco Use    Smoking status: Never Smoker    Smokeless tobacco: Never Used   Vaping Use    Vaping Use: Never used   Substance Use Topics    Alcohol use: No    Drug use: No            Review of Systems  Review of Systems    Objective:   Physical Exam  Vitals:    08/25/21 1610   BP: 124/78   Pulse: 57   Resp: 12   Temp: 96.8 °F (36 °C)   TempSrc: Temporal   SpO2: 98%   Weight: 162 lb 3.2 oz (73.6 kg)     Wt Readings from Last 3 Encounters:   08/25/21 162 lb 3.2 oz (73.6 kg)   12/07/20 160 lb (72.6 kg)   11/10/20 160 lb (72.6 kg)        Physical Exam    NAD    Skin is warm and dry. No rash. Well hydrated  Alert and oriented x 3. Mood and affect are normal.  Mildly fatigued appearing. The neck is supple and free of adenopathy or masses, the thyroid is normal without enlargement or nodules. Chest: clear with no wheezes or rales. No retractions, or use of accessory muscles noted. Cardiovascular: PMI is not displaced, and no thrill noted. Regular rate and rhythm with no rub, murmur or gallop. No peripheral edema. The abdomen is soft with mild diffuse tenderness; no guarding, mass, rebound or organomegaly. Aorta, femoral, DP and PT pulses intact.    + hi frequency, low amp tremor right UE. No cogwheeling, masked facies. Assessment:       Diagnosis Orders   1. Intractable vomiting with nausea, unspecified vomiting type  PAOLA Coppola MD, Gastroenterology, [facility]  Suspect secondary to [facility]  Continue low residue diet for now. Add Protonix; stop Prilosec   2. Hiatal hernia  CBC Auto Differential    PAOLA Coppola MD, Gastroenterology, [facility]   3. Acromegaly and gigantism ([facility]  Comprehensive Metabolic Panel    Hemoglobin A1C    TSH with Reflex    CBC Auto Differential    GROWTH HORMONE    INSULIN-LIKE GROWTH FACTOR    PROLACTIN    FOLLICLE STIMULATING HORMONE    Importance of labs, medication compliance and endocrinology follow up addressed  She will schedule with Dr. Laurent Tompkins   4.  Glucocorticoid deficiency ([facility]  Comprehensive Metabolic Panel    Hemoglobin A1C    TSH with Reflex    CBC Auto Differential    GROWTH HORMONE INSULIN-LIKE GROWTH FACTOR    PROLACTIN    FOLLICLE STIMULATING HORMONE   5. Elevated liver enzymes  cmp   6. Pure hypercholesterolemia  Lipid Panel   7. 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[address]  Comprehensive Metabolic Panel    Hemoglobin A1C    TSH with Reflex    GROWTH HORMONE    INSULIN-LIKE GROWTH FACTOR    PROLACTIN    FOLLICLE STIMULATING HORMONE   8. Elevated fasting blood sugar  Hemoglobin A1C   9. 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 At high risk for falls            Plan:      Side effects of current medications reviewed and questions answered. Follow up in 3 months or prn. On the basis of positive falls risk screening, assessment and plan is as follows: home safety tips provided.

## 2021-10-11 ENCOUNTER — OFFICE VISIT (OUTPATIENT)
Dept: FAMILY MEDICINE CLINIC | Age: 76
End: 2021-10-11
Payer: MEDICARE

## 2021-10-11 VITALS
RESPIRATION RATE: 16 BRPM | HEART RATE: 80 BPM | SYSTOLIC BLOOD PRESSURE: 124 MMHG | TEMPERATURE: 97.3 F | WEIGHT: 166 LBS | HEIGHT: 60 IN | BODY MASS INDEX: 32.59 KG/M2 | DIASTOLIC BLOOD PRESSURE: 86 MMHG | OXYGEN SATURATION: 97 %

## 2021-10-11 DIAGNOSIS — Z23 NEED FOR INFLUENZA VACCINATION: ICD-10-CM

## 2021-10-11 DIAGNOSIS — Z00.00 ROUTINE GENERAL MEDICAL EXAMINATION AT A HEALTH CARE FACILITY: Primary | ICD-10-CM

## 2021-10-11 DIAGNOSIS — Z23 NEED FOR PROPHYLACTIC VACCINATION AND INOCULATION AGAINST VARICELLA: ICD-10-CM

## 2021-10-11 PROCEDURE — G0008 ADMIN INFLUENZA VIRUS VAC: HCPCS | Performed by: FAMILY MEDICINE

## 2021-10-11 PROCEDURE — 90694 VACC AIIV4 NO PRSRV 0.5ML IM: CPT | Performed by: FAMILY MEDICINE

## 2021-10-11 PROCEDURE — G0438 PPPS, INITIAL VISIT: HCPCS | Performed by: FAMILY MEDICINE

## 2021-10-11 ASSESSMENT — PATIENT HEALTH QUESTIONNAIRE - PHQ9
SUM OF ALL RESPONSES TO PHQ QUESTIONS 1-9: 0
SUM OF ALL RESPONSES TO PHQ QUESTIONS 1-9: 0
SUM OF ALL RESPONSES TO PHQ9 QUESTIONS 1 & 2: 0
SUM OF ALL RESPONSES TO PHQ QUESTIONS 1-9: 0
1. LITTLE INTEREST OR PLEASURE IN DOING THINGS: 0
1. LITTLE INTEREST OR PLEASURE IN DOING THINGS: 0
SUM OF ALL RESPONSES TO PHQ9 QUESTIONS 1 & 2: 0
2. FEELING DOWN, DEPRESSED OR HOPELESS: 0
2. FEELING DOWN, DEPRESSED OR HOPELESS: 0

## 2021-10-11 ASSESSMENT — LIFESTYLE VARIABLES
HOW OFTEN DO YOU HAVE A DRINK CONTAINING ALCOHOL: NEVER
HOW OFTEN DO YOU HAVE A DRINK CONTAINING ALCOHOL: 0
AUDIT TOTAL SCORE: INCOMPLETE
AUDIT-C TOTAL SCORE: INCOMPLETE

## 2021-10-11 NOTE — PATIENT INSTRUCTIONS
Personalized Preventive Plan for Karolina Miners - 10/11/2021  Medicare offers a range of preventive health benefits. Some of the tests and screenings are paid in full while other may be subject to a deductible, co-insurance, and/or copay. Some of these benefits include a comprehensive review of your medical history including lifestyle, illnesses that may run in your family, and various assessments and screenings as appropriate. After reviewing your medical record and screening and assessments performed today your provider may have ordered immunizations, labs, imaging, and/or referrals for you. A list of these orders (if applicable) as well as your Preventive Care list are included within your After Visit Summary for your review. Other Preventive Recommendations:    · A preventive eye exam performed by an eye specialist is recommended every 1-2 years to screen for glaucoma; cataracts, macular degeneration, and other eye disorders. · A preventive dental visit is recommended every 6 months. · Try to get at least 150 minutes of exercise per week or 10,000 steps per day on a pedometer . · Order or download the FREE \"Exercise & Physical Activity: Your Everyday Guide\" from [employer]. Call 8-973.429.4531 or search [employer] online. · You need 9350-7726 mg of calcium and 2826-9347 IU of vitamin D per day. It is possible to meet your calcium requirement with diet alone, but a vitamin D supplement is usually necessary to meet this goal.  · When exposed to the sun, use a sunscreen that protects against both UVA and UVB radiation with an SPF of 30 or greater. Reapply every 2 to 3 hours or after sweating, drying off with a towel, or swimming. · Always wear a seat belt when traveling in a car. Always wear a helmet when riding a bicycle or motorcycle.

## 2021-10-11 NOTE — PROGRESS NOTES
Medicare Annual Wellness Visit  Name: Sabina Rojas Date: 10/11/2021   MRN: [id number] Sex: Female   Age: 77 y.o. Ethnicity: Non- / Non    : 1945 Race: White (non-)      Gregorio Yun is here for Medicare AWV    Screenings for behavioral, psychosocial and functional/safety risks, and cognitive dysfunction are all negative except as indicated below. These results, as well as other patient data from the [address] form, are documented in Flowsheets linked to this Encounter.     Allergies   Allergen Reactions    Sulfa Antibiotics      anaphylaxis    Codeine      vomiting    Demerol      nausea    Flurbiprofen Itching    Nsaids Itching and Dermatitis     States that all NSAIDS cause severe itching     Outpatient Medications Marked as Taking for the 10/11/21 encounter (Office Visit) with Tomy Kidd MD   Medication Sig Dispense Refill    pantoprazole (PROTONIX) 40 MG tablet Take 1 tablet by mouth daily 30 tablet 5    omeprazole (PRILOSEC) 40 MG delayed release capsule TAKE ONE CAPSULE BY MOUTH DAILY 90 capsule 1    atorvastatin (LIPITOR) 20 MG tablet TAKE ONE TABLET BY MOUTH DAILY 90 tablet 1    budesonide (RHINOCORT AQUA) 32 MCG/ACT nasal spray 2 sprays by Each Nostril route 2 times daily 8.6 g 2    Cholecalciferol (VITAMIN D3) 50 MCG ( UT) CAPS Take 1,000 Units by mouth daily      levothyroxine (SYNTHROID) 100 MCG tablet       CALCIUM PO Take by mouth      predniSONE (DELTASONE) 5 MG tablet Take 5 mg by mouth daily          Past Medical History:   Diagnosis Date    Anxiety     Depression     GERD (gastroesophageal reflux disease)     History of blood transfusion     Hypothyroidism     Osteoporosis     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    Restless legs syndrome (RLS)     Wears glasses        Past Surgical History:   Procedure Laterality Date    CARPAL TUNNEL RELEASE Bilateral     CHOLECYSTECTOMY  2013 DIAGNOSTIC LAPAROSCOPY,LIGATION OF RIGHT ROUND    HERNIA REPAIR N/A 8/11/2020    ROBOTIC INCISIONAL INCARCERATED HERNIA REPAIR WITH MESH performed by Estevan Castellano DO [facility] Bilateral     2006 & 2007    PITUITARY EXCISION  1990 5/6 removed - Dr Shruthi Barnes       Family History   Problem Relation Age of Onset    No Known Problems Mother     No Known Problems Father     No Known Problems Maternal Grandmother     No Known Problems Maternal Grandfather     No Known Problems Paternal Grandmother     No Known Problems Paternal Grandfather     No Known Problems Sister     No Known Problems Brother     No Known Problems Paternal Aunt     No Known Problems Paternal Uncle     No Known Problems Maternal Aunt     No Known Problems Maternal Uncle     No Known Problems Other     Anesth Problems Neg Hx     Malig Hyperten Neg Hx     Hypotension Neg Hx     Malig Hypertherm Neg Hx     Pseudochol. Deficiency Neg Hx        CareTeam (Including outside providers/suppliers regularly involved in providing care):   Patient Care Team:  Franky Schmidt MD as PCP - General (Family Medicine)  Franky Schmidt MD as PCP - [facility] Empaneled Provider  Franky Schmidt MD (Family Medicine)  Yanci Lindquist MD as Consulting Physician (Gastroenterology)    Wt Readings from Last 3 Encounters:   10/11/21 166 lb (75.3 kg)   08/25/21 162 lb 3.2 oz (73.6 kg)   12/07/20 160 lb (72.6 kg)     Vitals:    10/11/21 1509   BP: 124/86   Pulse: 80   Resp: 16   Temp: 97.3 °F (36.3 °C)   TempSrc: Temporal   SpO2: 97%   Weight: 166 lb (75.3 kg)   Height: 5' (1.524 m)     Body mass index is 32.42 kg/m². Based upon direct observation of the patient, evaluation of cognition reveals recent and remote memory intact.     General Appearance: alert and oriented to person, place and time, well developed and well- nourished, in no acute distress  Skin: warm and dry, no rash or erythema  Head: normocephalic and atraumatic  Eyes: pupils equal, round, and reactive to light, extraocular eye movements intact, conjunctivae normal  ENT: tympanic membrane, external ear and ear canal normal bilaterally, nose without deformity, nasal mucosa and turbinates normal without polyps  Neck: supple and non-tender without mass, no thyromegaly or thyroid nodules, no cervical lymphadenopathy  Pulmonary/Chest: clear to auscultation bilaterally- no wheezes, rales or rhonchi, normal air movement, no respiratory distress  Cardiovascular: normal rate, regular rhythm, normal S1 and S2, no murmurs, rubs, clicks, or gallops, distal pulses intact, no carotid bruits  Abdomen: soft, non-tender, non-distended, normal bowel sounds, no masses or organomegaly  Extremities: no cyanosis, clubbing or edema  Musculoskeletal: normal range of motion, no joint swelling, deformity or tenderness  Neurologic: reflexes normal and symmetric, no cranial nerve deficit, gait, coordination and speech normal    Patient's complete Health Risk Assessment and screening values have been reviewed and are found in Flowsheets. The following problems were reviewed today and where indicated follow up appointments were made and/or referrals ordered. Positive Risk Factor Screenings with Interventions:      Cognitive: Words recalled: 0 Words Recalled  Clock Drawing Test (CDT) Score: Normal  Total Score Interpretation: Positive Mini-Cog  Did the patient refuse to take the cognition test?: No  Cognitive Impairment Interventions:  · she notes is forgetful about things other people say. she has no trouble paying bills, getting a meal etc   · Mom had dementia in her 76s. General Health and ACP:  General  In general, how would you say your health is?: Very Good  In the past 7 days, have you experienced any of the following?  New or Increased Pain, New or Increased Fatigue, Loneliness, Social Isolation, Stress or Anger?: (!) New or Increased Fatigue  Do you get the social and emotional support that you need?: Yes  Do you have a Living Will?: Yes  Advance Directives     Power of  Living Will ACP-Advance Directive ACP-Power of     Not on File Filed on 06/24/15 Filed Not on File      General Health Risk Interventions:  · will provide a copy LW and DPOA   · Fatigue is an intermittent and self limiting. · Has not seen endocrinology for     Health Habits/Nutrition:  Health Habits/Nutrition  Do you exercise for at least 20 minutes 2-3 times per week?: (!) No  Have you lost any weight without trying in the past 3 months?: No  Do you eat only one meal per day?: No  Have you seen the dentist within the past year?: (!) No  Body mass index: (!) 32.42  Health Habits/Nutrition Interventions:  · Inadequate physical activity:  referred to [facility]. · Dental exam overdue:  patient encouraged to make appointment with his/her dentist    Hearing/Vision:  No exam data present  Hearing/Vision  Do you or your family notice any trouble with your hearing that hasn't been managed with hearing aids?: (!) Yes  Do you have difficulty driving, watching TV, or doing any of your daily activities because of your eyesight?: No  Have you had an eye exam within the past year?: (!) No  Hearing/Vision Interventions:  · Hearing concerns:  loss in left only; followed by ENT. · Vision concerns:  patient encouraged to make appointment with his/her eye specialist    Safety:  Safety  Do you have working smoke detectors?: Yes  Have all throw rugs been removed or fastened?: Yes  Do you have non-slip mats or surfaces in all bathtubs/showers?: (!) No  Do all of your stairways have a railing or banister?: (!) No  Are your doorways, halls and stairs free of clutter?: Yes  Do you always fasten your seatbelt when you are in a car?: Yes  Safety Interventions:  · Home safety tips provided    ADL:  ADLs  In the past 7 days, did you need help from others to perform any of the following everyday activities?  Eating, dressing, grooming, bathing, toileting, or walking/balance?: None  In the past 7 days, did you need help from others to take care of any of the following? Laundry, housekeeping, banking/finances, shopping, telephone use, food preparation, transportation, or taking medications?: (!) Transportation  ADL Interventions:  · has transporation support    Personalized Preventive Plan   Current Health Maintenance Status  Immunization History   Administered Date(s) Administered    COVID-19, Moderna, PF, 100mcg/0.5mL 01/25/2021, 02/22/2021    Influenza, High Dose (Fluzone 65 yrs and older) 09/27/2011, 10/29/2012, 11/08/2013, 12/01/2014, 09/10/2015, 09/06/2018, 10/03/2019    Influenza, High-dose, Steven Pilar, 65 yrs +, IM (Fluzone) 09/15/2020    Influenza, Quadv, adjuvanted, 65 yrs +, IM, PF (Fluad) 10/11/2021    Pneumococcal Conjugate 13-valent ([id number]) 05/13/2015    Pneumococcal Polysaccharide ([id number]) 09/27/2011    Tdap (Boostrix, Adacel) 09/27/2011, 02/27/2018, 05/31/2019        Health Maintenance   Topic Date Due    Shingles Vaccine (2 of 3) 09/17/2007    Lipid screen  04/10/2019    Annual Wellness Visit (AWV)  Never done    TSH testing  09/29/2022    Colon cancer screen colonoscopy  04/27/2023    DTaP/Tdap/Td vaccine (4 - Td or Tdap) 05/31/2029    DEXA (modify frequency per FRAX score)  Completed    Flu vaccine  Completed    Pneumococcal 65+ years Vaccine  Completed    COVID-19 Vaccine  Completed    Hepatitis C screen  Completed    Hepatitis A vaccine  Aged Out    Hepatitis B vaccine  Aged Out    Hib vaccine  Aged Out    Meningococcal (ACWY) vaccine  Aged Out     Recommendations for Spayee Due: see orders and patient instructions/AVS.  . Recommended screening schedule for the next 5-10 years is provided to the patient in written form: see Patient Kathleen Pope was seen today for medicare awv.     Diagnoses and all orders for this visit:    Routine general medical examination at a health care facility    Need for influenza vaccination  -     INFLUENZA, QUADV, ADJUVANTED, 65 YRS =, IM, PF, PREFILL SYR, 0.5ML (FLUAD)    Need for prophylactic vaccination and inoculation against varicella  -     zoster recombinant adjuvanted vaccine (SHINGRIX) 50 MCG/0.5ML SUSR injection; Inject 0.5 mLs into the muscle once for 1 dose    She will have labs done this week. Advance Care Planning   Advanced Care Planning: Discussed the patients choices for care and treatment in case of a health event that adversely affects decision-making abilities. Also discussed the patients long-term treatment options. Reviewed with the patient the [address] [place] [address] of [address] [place] [address] Declaration forms  Reviewed the process of designating a competent adult as an Agent (or -in-fact) that could take make health care decisions for the patient if incompetent. Patient was asked to complete the declaration forms, either acknowledge the forms by a public notary or an eligible witness and provide a signed copy to the practice office. Time spent (minutes): 5    Obesity Counseling: Assessed behavioral health risks and factors affecting choice of behavior. Suggested weight control approaches, including dietary changes behavioral modification and follow up plan. Provided educational and support documentation. Time spent (minutes): 2  Cardiovascular Disease Risk Counseling: Assessed the patient's risk to develop cardiovascular disease and reviewed main risk factors.    Reviewed steps to reduce disease risk including:   · Quitting tobacco use, reducing amount smoked, or not starting the habit  · Making healthy food choices  · Being physically active and gradualy increasing activity levels   · Reduce weight and determine a healthy BMI goal  · Monitor blood pressure and treat if higher than 140/90 mmHg  · Maintain blood total cholesterol levels under 5 mmol/l or 190 mg/dl  · Maintain LDL cholesterol levels under 3.0 mmol/l or 115 mg/dl   · Control blood glucose levels  · Consider taking aspirin (75 mg daily), once blood pressure is controlled   Provided a follow up plan.   Time spent (minutes): 2

## 2021-10-19 ENCOUNTER — HOSPITAL ENCOUNTER (OUTPATIENT)
Dept: CT IMAGING | Age: 76
Discharge: HOME | End: 2021-10-19
Payer: MEDICARE

## 2021-10-19 DIAGNOSIS — R10.31 ABDOMINAL PAIN, RIGHT LOWER QUADRANT: ICD-10-CM

## 2021-10-19 PROCEDURE — 6360000004 HC RX CONTRAST MEDICATION: Performed by: INTERNAL MEDICINE

## 2021-10-19 PROCEDURE — 74177 CT ABD & PELVIS W/CONTRAST: CPT

## 2021-10-19 RX ADMIN — IOHEXOL 50 ML: 240 INJECTION, SOLUTION INTRATHECAL; INTRAVASCULAR; INTRAVENOUS; ORAL at 15:52

## 2021-10-19 RX ADMIN — IOPAMIDOL 80 ML: 755 INJECTION, SOLUTION INTRAVENOUS at 15:51

## 2021-12-15 ENCOUNTER — TELEPHONE (OUTPATIENT)
Dept: FAMILY MEDICINE CLINIC | Age: 76
End: 2021-12-15

## 2021-12-15 RX ORDER — LEVOTHYROXINE SODIUM 0.1 MG/1
100 TABLET ORAL DAILY
Qty: 90 TABLET | Refills: 1 | Status: SHIPPED
Start: 2021-12-15 | End: 2022-10-06 | Stop reason: DRUGHIGH

## 2021-12-15 NOTE — TELEPHONE ENCOUNTER
Patient would like to know if you would fill her levothyroxine (SYNTHROID) 100 MCG tablet   Dr Jason Johnson usually fills for her,but she cant get a hold of her office. Please call patient to let her know at [phone] (H).  Ulises Hemphill [address] [place] [address], [address] [facility] [phone] - F [phone]

## 2021-12-15 NOTE — TELEPHONE ENCOUNTER
Requested Prescriptions     Pending Prescriptions Disp Refills    levothyroxine (SYNTHROID) 100 MCG tablet 30 tablet        lov 10/11/2021  No f/u  Labs 10/11/21

## 2022-02-16 DIAGNOSIS — E78.00 PURE HYPERCHOLESTEROLEMIA: ICD-10-CM

## 2022-02-16 RX ORDER — ATORVASTATIN CALCIUM 20 MG/1
TABLET, FILM COATED ORAL
Qty: 30 TABLET | Refills: 0 | Status: SHIPPED | OUTPATIENT
Start: 2022-02-16 | End: 2022-02-21 | Stop reason: SDUPTHER

## 2022-02-16 NOTE — TELEPHONE ENCOUNTER
10/11/2021 LOV  No Scheduled FOV    30 days of medication pending to be signed. LMOM for the patient to call and schedule an appointment.

## 2022-02-21 ENCOUNTER — OFFICE VISIT (OUTPATIENT)
Dept: FAMILY MEDICINE CLINIC | Age: 77
End: 2022-02-21
Payer: MEDICARE

## 2022-02-21 VITALS
SYSTOLIC BLOOD PRESSURE: 114 MMHG | WEIGHT: 157.4 LBS | RESPIRATION RATE: 15 BRPM | OXYGEN SATURATION: 97 % | TEMPERATURE: 97.3 F | DIASTOLIC BLOOD PRESSURE: 70 MMHG | HEART RATE: 69 BPM | BODY MASS INDEX: 30.74 KG/M2

## 2022-02-21 DIAGNOSIS — E78.00 PURE HYPERCHOLESTEROLEMIA: ICD-10-CM

## 2022-02-21 DIAGNOSIS — M19.039 WRIST ARTHRITIS: Primary | ICD-10-CM

## 2022-02-21 DIAGNOSIS — E28.39 ESTROGEN DEFICIENCY: ICD-10-CM

## 2022-02-21 DIAGNOSIS — Z12.31 ENCOUNTER FOR SCREENING MAMMOGRAM FOR MALIGNANT NEOPLASM OF BREAST: ICD-10-CM

## 2022-02-21 DIAGNOSIS — E22.0 ACROMEGALY AND GIGANTISM (HCC): ICD-10-CM

## 2022-02-21 DIAGNOSIS — K21.9 GASTROESOPHAGEAL REFLUX DISEASE, UNSPECIFIED WHETHER ESOPHAGITIS PRESENT: ICD-10-CM

## 2022-02-21 DIAGNOSIS — R07.9 CHEST PAIN, UNSPECIFIED TYPE: ICD-10-CM

## 2022-02-21 DIAGNOSIS — R41.3 MEMORY LOSS: ICD-10-CM

## 2022-02-21 DIAGNOSIS — Z79.52 IMMUNOSUPPRESSION DUE TO CHRONIC STEROID USE (HCC): ICD-10-CM

## 2022-02-21 DIAGNOSIS — D84.821 IMMUNOSUPPRESSION DUE TO CHRONIC STEROID USE (HCC): ICD-10-CM

## 2022-02-21 DIAGNOSIS — T38.0X5A IMMUNOSUPPRESSION DUE TO CHRONIC STEROID USE (HCC): ICD-10-CM

## 2022-02-21 PROCEDURE — 99214 OFFICE O/P EST MOD 30 MIN: CPT | Performed by: FAMILY MEDICINE

## 2022-02-21 PROCEDURE — 93000 ELECTROCARDIOGRAM COMPLETE: CPT | Performed by: FAMILY MEDICINE

## 2022-02-21 RX ORDER — PREDNISONE 1 MG/1
5 TABLET ORAL DAILY
Qty: 90 TABLET | Refills: 1 | Status: SHIPPED | OUTPATIENT
Start: 2022-02-21

## 2022-02-21 RX ORDER — ATORVASTATIN CALCIUM 20 MG/1
20 TABLET, FILM COATED ORAL DAILY
Qty: 90 TABLET | Refills: 1 | Status: SHIPPED | OUTPATIENT
Start: 2022-02-21 | End: 2022-09-23

## 2022-02-21 RX ORDER — PANTOPRAZOLE SODIUM 40 MG/1
40 TABLET, DELAYED RELEASE ORAL DAILY
Qty: 90 TABLET | Refills: 3 | Status: SHIPPED | OUTPATIENT
Start: 2022-02-21

## 2022-02-21 NOTE — PROGRESS NOTES
Subjective:      Patient ID: Sulma Malik 77 y.o. female. 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 A diagnosis of Acromegaly and gigantism ([address] was also pertinent to this visit. HPI    DUE Shingrix, COVID booster and DEXA scan. Wrist arthritis:  Was in the ER with severe wrist pain x 2 days. No trauma, injury or unusual activity. Xray showed erosions ulnar styloid and carpal bones. Consider RA, chondrocalcinosis. She was treated with prednisone 20 mg BID - she only took it once a day. . No labs were done   Pain is almost gone  FH neg for autoimmune arthritis. Acromegaly: she sees Dr. Krystal Trejo at [facility]. Last appt 7/25/21. Has appt on 2/23. She ran out of prednisone; she is not sure how long ago but maybe a few weeks. She has been exhausted, forgetful. Was a bit better after taking the steroids over the weekend. Her partner is with her and is worried about her memory. Forgetful. Tired all the time for a year or so. She gets confused easily. She will forget that Jass Cardenas told her she fed the cat. Not always sure what day it is (has not schedule). She is able to work the [employer]. She spends a lot of time in the bed or the chair. Rarely goes outside. May be a little depressed. Sometimes falls asleep early and then wakes up early. Appetite is poor. Stopped driving - never renewed her licence after did not drive for a long time with COVID restrictions    FH + dementia in her Mom in her 62s. Sister age 61 has no memory problems      Stomach gets upset easily. Has recurrent vomiting - chronic. Saw GI who put her on laxatives. She never took them she has intermittent constipation. The vomiting has improved - vomiting once a week or so. Has a BM every 2 to 3 days. Straining is \"not bad\". Has heartburn every AM.  She is taking PPI only occasionally. Had pain in the chest a few weeks ago at rest.   Has had this a few times in the past few months. Davide Anderson does not remember this. No exertional CP, palpitations, dyspnea. Often feels light headed or balance off but not pre-syncopal and no syncope. Hyperlipidemia:  No new myalgias or GI upset on atorvastatin (Lipitor). Medication compliance: noncompliant: ran out a few weeks ago. . Patient is  following a low fat, low cholesterol diet. She is not exercising regularly. Lab Results   Component Value Date    CHOL 202 (H) 10/11/2021    TRIG 197 (H) 10/11/2021    HDL 56 10/11/2021    LDLCALC 107 (H) 10/11/2021     Lab Results   Component Value Date    ALT 16 10/11/2021    AST 24 10/11/2021           The 10-year ASCVD risk score (Carolee Matthews, et al., 2013) is: 13%    Values used to calculate the score:      Age: 77 years      Sex: Female      Is Non- : No      Diabetic: No      Tobacco smoker: No      Systolic Blood Pressure: 175 mmHg      Is BP treated: No      HDL Cholesterol: 56 mg/dL      Total Cholesterol: 202 mg/dL       Lab Results   Component Value Date     (L) 10/11/2021    K 5.0 10/11/2021    CL 99 10/11/2021    CO2 19 (L) 10/11/2021    BUN 12 10/11/2021    CREATININE 0.9 10/11/2021    GLUCOSE 112 (H) 10/11/2021    CALCIUM 10.1 10/11/2021    PROT 7.7 10/11/2021    LABALBU 4.0 10/11/2021    BILITOT 0.4 10/11/2021    ALKPHOS 85 10/11/2021    AST 24 10/11/2021    ALT 16 10/11/2021    LABGLOM >60 10/11/2021    GFRAA >60 10/11/2021    AGRATIO 1.1 10/11/2021    GLOB 3.7 10/11/2021        Lab Results   Component Value Date    WBC 11.5 (H) 10/11/2021    HGB 14.9 10/11/2021    HCT 46.1 10/11/2021    MCV 90.4 10/11/2021     10/11/2021     No results found for: CRP  .   Lab Results   Component Value Date    LABA1C 5.5 10/11/2021     Lab Results   Component Value Date    .2 10/11/2021      TSH   Date Value Ref Range Status   09/29/2021 0.005 uIU/mL Final   04/10/2018 <0.01 (L) 0.27 - 4.20 uIU/mL Final   06/23/2015 0.01 uIU/mL Final   10/17/2013 0.01 uIU/mL Final 08/28/2013 <0.01 (L) 0.35 - 5.50 uIU/mL Final       Lab Results   Component Value Date    [id number] 04/10/2018        Outpatient Medications Marked as Taking for the 2/21/22 encounter (Office Visit) with Angely Bautista MD   Medication Sig Dispense Refill    atorvastatin (LIPITOR) 20 MG tablet TAKE ONE TABLET BY MOUTH DAILY 30 tablet 0    levothyroxine (SYNTHROID) 100 MCG tablet Take 1 tablet by mouth Daily 90 tablet 1    pantoprazole (PROTONIX) 40 MG tablet Take 1 tablet by mouth daily (Patient taking differently: Take 40 mg by mouth daily occassionall) 30 tablet 5    Cholecalciferol (VITAMIN D3) 50 MCG (2000 UT) CAPS Take 1,000 Units by mouth daily      CALCIUM PO Take by mouth sometimes      predniSONE (DELTASONE) 5 MG tablet Take 5 mg by mouth daily          Allergies   Allergen Reactions    Sulfa Antibiotics      anaphylaxis    Codeine      vomiting    Demerol      nausea    Flurbiprofen Itching    Nsaids Itching and Dermatitis     States that all NSAIDS cause severe itching       Patient Active Problem List   Diagnosis    [address]op[address] ([facility] [address]    Hyperlipemia    Osteoporosis    Reactive depression (situational)    Acromegaly and gigantism (HCC)    Hypothyroidism    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HCC)    Glucocorticoid deficiency (HCC)    Vitamin D deficiency    Osteopenia    Nausea & vomiting    Hiatal hernia    Elevated liver enzymes    Anemia       Past Medical History:   Diagnosis Date    Anxiety     Depression     GERD (gastroesophageal reflux disease)     History of blood transfusion     Hypothyroidism     Osteoporosis     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    Restless legs syndrome (RLS)     Wears glasses        Past Surgical History:   Procedure Laterality Date    CARPAL TUNNEL RELEASE Bilateral     CHOLECYSTECTOMY  05/13/2013    DIAGNOSTIC LAPAROSCOPY,LIGATION OF RIGHT ROUND    HERNIA REPAIR N/A 8/11/2020    ROBOTIC INCISIONAL INCARCERATED HERNIA REPAIR WITH MESH performed by Vianca Max DO at [facility] Bilateral     2006 & 2007    PITUITARY EXCISION  1990 5/6 removed -  Jayne Man        Family History   Problem Relation Age of Onset    Diabetes Mother     Pancreatic Cancer Father     No Known Problems Sister     No Known Problems Brother     No Known Problems Maternal Grandmother     No Known Problems Maternal Grandfather     No Known Problems Paternal Grandmother     No Known Problems Paternal Grandfather     No Known Problems Maternal Aunt     No Known Problems Maternal Uncle     No Known Problems Paternal Aunt     No Known Problems Paternal Uncle     No Known Problems Other     Anesth Problems Neg Hx     Malig Hyperten Neg Hx     Hypotension Neg Hx     Malig Hypertherm Neg Hx     Pseudochol. Deficiency Neg Hx        Social History     Tobacco Use    Smoking status: Never Smoker    Smokeless tobacco: Never Used   Vaping Use    Vaping Use: Never used   Substance Use Topics    Alcohol use: No    Drug use: No            Review of Systems  Review of Systems    Objective:   Physical Exam  Vitals:    02/21/22 1410   BP: 114/70   Pulse: 69   Resp: 15   Temp: 97.3 °F (36.3 °C)   TempSrc: Temporal   SpO2: 97%   Weight: 157 lb 6.4 oz (71.4 kg)       Physical Exam  NAD    Skin is warm and dry. No rash. Well hydrated  Alert and oriented x 3. Mood and affect are mildly depressed. Speech is clear and appropriate. The neck is supple and free of adenopathy or masses, the thyroid is normal without enlargement or nodules. Chest: clear with no wheezes or rales. No retractions, or use of accessory muscles noted. Cardiovascular: PMI is not displaced, and no thrill noted. Regular rate and rhythm with no rub, murmur or gallop. No peripheral edema. No carotid bruits. The abdomen is soft without tenderness, guarding, mass, rebound or organomegaly. Aorta, femoral, DP and PT pulses intact.     No swelling, tenderness, synovitis wrists, MPJS,      EKG - low voltage, no acute changes. Assessment:       Diagnosis Orders   1. Wrist arthritis  C-Reactive Protein    Cyclic Citrul Peptide Antibody, IgG    Rheumatoid Factor    Comprehensive Metabolic Panel    CBC with Auto Differential  Improved with steroid burst.  Consider rheum referral    2. Acromegaly and gigantism ([address]  CBC with Auto Differential  Importance of NOT running out of prednisone addressed. Her partner, Sreekanth Mobley, is going to start to manage her meds. appt with Dr. Aleksandra Carpio this week as planned. 3. Estrogen deficiency  DEXA BONE DENSITY 2 SITES   4. Pure hypercholesterolemia  atorvastatin (LIPITOR) 20 MG tablet   5. Encounter for screening mammogram for malignant neoplasm of breast  BREN DIGITAL SCREEN W OR WO CAD BILATERAL   6. 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)  DEXA BONE DENSITY 2 SITES   7. Chest pain, unspecified type  EKG 12 Lead    CARDIAC STRESS TEST EXERCISE ONLY  Could be GERD; back on PPI daily   To ER if worsening/presistent pain. 9.  Memory loss - could be depression, isolation, early Alzheimer's. Check labs. Consider MRI or neurology referral.  Consider SSRI. Addressed; decided to address chest pain, arthritis and endocrinology issues first       Plan:      Side effects of current medications reviewed and questions answered. Follow up in 4 weeks or prn.

## 2022-02-24 DIAGNOSIS — E53.8 FOLATE DEFICIENCY: Primary | ICD-10-CM

## 2022-02-24 RX ORDER — B-COMPLEX WITH VITAMIN C
1 TABLET ORAL DAILY
Refills: 0 | COMMUNITY
Start: 2022-02-24

## 2022-02-25 ENCOUNTER — TELEPHONE (OUTPATIENT)
Dept: FAMILY MEDICINE CLINIC | Age: 77
End: 2022-02-25

## 2022-02-25 ENCOUNTER — HOSPITAL ENCOUNTER (OUTPATIENT)
Dept: NON INVASIVE DIAGNOSTICS | Age: 77
Discharge: HOME | End: 2022-02-25
Payer: MEDICARE

## 2022-02-25 DIAGNOSIS — R07.9 CHEST PAIN, UNSPECIFIED TYPE: ICD-10-CM

## 2022-02-25 PROCEDURE — 93017 CV STRESS TEST TRACING ONLY: CPT

## 2022-02-25 NOTE — TELEPHONE ENCOUNTER
Patient called returning a call from the office concerning test results and wants to know how to proceed because of medications. Patient would like a call back as soon as possible.     [phone] (home)

## 2022-08-18 RX ORDER — OMEPRAZOLE 40 MG/1
CAPSULE, DELAYED RELEASE ORAL
Qty: 90 CAPSULE | Refills: 1 | Status: SHIPPED | OUTPATIENT
Start: 2022-08-18 | End: 2022-10-07

## 2022-08-18 NOTE — TELEPHONE ENCOUNTER
02/21/2022 LOV  No Scheduled appointment. Labs: 02/21/2022 Ordered - Not Completed    Medication pending to be signed.     Requested Prescriptions     Pending Prescriptions Disp Refills    omeprazole (PRILOSEC) 40 MG delayed release capsule [Pharmacy Med Name: OMEPRAZOLE DR 40MG CAPSULE, UU] 90 capsule 1     Sig: TAKE ONE CAPSULE BY MOUTH DAILY

## 2022-09-23 DIAGNOSIS — E78.00 PURE HYPERCHOLESTEROLEMIA: ICD-10-CM

## 2022-09-23 RX ORDER — ATORVASTATIN CALCIUM 20 MG/1
TABLET, FILM COATED ORAL
Qty: 90 TABLET | Refills: 1 | Status: SHIPPED | OUTPATIENT
Start: 2022-09-23

## 2022-09-23 NOTE — TELEPHONE ENCOUNTER
Requested Prescriptions     Pending Prescriptions Disp Refills    atorvastatin (LIPITOR) 20 MG tablet [Pharmacy Med Name: ATORVASTATIN 20 MG TABLET] 90 tablet 1     Sig: TAKE ONE TABLET BY MOUTH DAILY     Last OV; 2/21/2022  Last labs; 2/23/2022  No f/u    Sent an message on [employer] reminding pt she is overdue for an office.

## 2022-10-06 RX ORDER — LEVOTHYROXINE SODIUM 112 UG/1
100 TABLET ORAL DAILY
COMMUNITY
Start: 2022-08-23

## 2022-10-06 NOTE — PROGRESS NOTES
Subjective:      Patient ID: Silvia Jenkins 69 y.o. female. The primary encounter diagnosis was Atrial fibrillation, unspecified type ([address] Diagnoses of 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[address] and Acquired hypothyroidism were also pertinent to this visit. HPI    DUE flu vaccine, COVID booster, shingles vaccine. Afib:  Abisai Carballo was hospitalized after confusion at home. Was found to be hypotensive and in rapid atrial fibrillation. She was treated with amiodarone and Cardizem and converted to NSR. Mentation improved. Echo:  EF 60-65%. No LVH. Normal diastolic dysfunction. Left atrium size is upper limits of normal.  Normal valves except mild moderate tricuspid regurgitation. She knows to avoid NSAID. She does not recall the day or two before her hospitalization but is essentially back to normal.  She dose have a mild upset stomach. No constipation or diarrhea. Was discharged on Eliquis. Has appt with cardiology on 11/16/22    Her partner notes she has sleep apnea. Has daytime sleepiness.       Synthroid was decreased from 112 to 100 mcg     H/H 11.5/35.2  Creatinine: 0.73  K 3.6  Na 139  Magnesium 1.7  TSH <.005  T4 1.89    Lab Results   Component Value Date    CHOL 202 (H) 10/11/2021    CHOL 154 04/10/2018    CHOL 205 (H) 03/18/2013     Lab Results   Component Value Date    TRIG 197 (H) 10/11/2021    TRIG 175 (H) 04/10/2018    TRIG 128 03/18/2013     Lab Results   Component Value Date    HDL 56 10/11/2021    HDL 58 04/10/2018    HDL 63 (H) 03/18/2013     Lab Results   Component Value Date    LDLCALC 107 (H) 10/11/2021    LDLCALC 61 04/10/2018    LDLCALC 117 (H) 03/18/2013     Lab Results   Component Value Date    LABVLDL 39 10/11/2021    LABVLDL 35 04/10/2018    LABVLDL 26 03/18/2013     Lab Results   Component Value Date    CHOLHDLRATIO 4.1 12/02/2010        Outpatient Medications Marked as Taking for the 10/7/22 encounter (Office Visit) with Mary Stewart MD   Medication Sig Dispense Refill apixaban (ELIQUIS) 5 MG TABS tablet Take 5 mg by mouth 2 times daily      levothyroxine (SYNTHROID) 112 MCG tablet Take 100 mcg by mouth daily      atorvastatin (LIPITOR) 20 MG tablet TAKE ONE TABLET BY MOUTH DAILY 90 tablet 1    Vitamin B Complex-C CAPS Take 1 capsule by mouth daily  0    predniSONE (DELTASONE) 5 MG tablet Take 1 tablet by mouth daily 90 tablet 1    pantoprazole (PROTONIX) 40 MG tablet Take 1 tablet by mouth daily 90 tablet 3    Cholecalciferol (VITAMIN D3) 50 MCG (2000 UT) CAPS Take 1,000 Units by mouth daily      CALCIUM PO Take by mouth sometimes     f     Allergies   Allergen Reactions    Sulfa Antibiotics      anaphylaxis    Codeine      vomiting    Demerol      nausea    Flurbiprofen Itching    Nsaids Itching and Dermatitis     States that all NSAIDS cause severe itching       Patient Active Problem List   Diagnosis    Hypopituitarism (HCC)    Hyperlipemia    Osteoporosis    Reactive depression (situational)    Acromegaly and gigantism (HCC)    Hypothyroidism    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HCC)    Glucocorticoid deficiency (HCC)    Vitamin D deficiency    Osteopenia    Nausea & vomiting    Hiatal hernia    Elevated liver enzymes    Anemia    Memory loss    Folate deficiency       Past Medical History:   Diagnosis Date    Anxiety     Depression     GERD (gastroesophageal reflux disease)     History of blood transfusion     Hypothyroidism     Osteoporosis     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    Restless legs syndrome (RLS)     Wears glasses        Past Surgical History:   Procedure Laterality Date    CARPAL TUNNEL RELEASE Bilateral     CHOLECYSTECTOMY  05/13/2013    DIAGNOSTIC LAPAROSCOPY,LIGATION OF RIGHT ROUND    HERNIA REPAIR N/A 8/11/2020    ROBOTIC INCISIONAL INCARCERATED HERNIA REPAIR WITH MESH performed by Emani Chávez DO at [facility]. [address] Final Bilateral     2006 & 2007    [address] [place]    5/6 removed - Dr Emani Velasquez        Family History   Problem Relation Age of Onset    Diabetes Mother     Pancreatic Cancer Father     No Known Problems Sister     No Known Problems Brother     No Known Problems Maternal Grandmother     No Known Problems Maternal Grandfather     No Known Problems Paternal Grandmother     No Known Problems Paternal Grandfather     No Known Problems Maternal Aunt     No Known Problems Maternal Uncle     No Known Problems Paternal Aunt     No Known Problems Paternal Uncle     No Known Problems Other     Anesth Problems Neg Hx     Malig Hyperten Neg Hx     Hypotension Neg Hx     Malig Hypertherm Neg Hx     Pseudochol. Deficiency Neg Hx        Social History     Tobacco Use    Smoking status: Never    Smokeless tobacco: Never   Vaping Use    Vaping Use: Never used   Substance Use Topics    Alcohol use: No    Drug use: No            Review of Systems  Review of Systems    Objective:   Physical Exam  Vitals:    10/07/22 1419   BP: 110/60   Pulse: 61   Resp: 14   Temp: 97.8 °F (36.6 °C)   TempSrc: Temporal   SpO2: 97%   Weight: 169 lb 9.6 oz (76.9 kg)     Wt Readings from Last 3 Encounters:   10/07/22 169 lb 9.6 oz (76.9 kg)   02/21/22 157 lb 6.4 oz (71.4 kg)   10/11/21 166 lb (75.3 kg)        Physical Exam  NAD    Skin is warm and dry. No rash. Well hydrated  Alert and oriented x 3. Mood and affect are normal.  The neck is supple and free of adenopathy or masses, the thyroid is normal without enlargement or nodules. Chest: clear with no wheezes or rales. No retractions, or use of accessory muscles noted. Cardiovascular: PMI is not displaced, and no thrill noted. Regular rate and rhythm with no rub, murmur or gallop. No peripheral edema. The abdomen is soft without tenderness, guarding, mass, rebound or organomegaly. Aorta, femoral, DP and PT pulses intact. Assessment:       Diagnosis Orders   1.  Atrial fibrillation, unspecified type (HCC)  apixaban (ELIQUIS) 5 MG TABS tablet  Sleep study  Follow up with cardiology as planned  Advised to avoid NSAID, to ER if even minor head injury       2. 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[address]  Per Dr. Billie Blanchard       3. Acquired hypothyroidism  Per Dr. Billie Blanchard      4. Pure hypercholesterolemia  Lipid Panel      5. Sleep disorder  Jeff Shanks MD, Sleep Medicine, [facility]      6. Dyspepsia  She is taking PPI with food. Addressed taking on an empty stomach             Plan:      Side effects of current medications reviewed and questions answered. Follow up in 3 months or prn.

## 2022-10-07 ENCOUNTER — TELEPHONE (OUTPATIENT)
Dept: FAMILY MEDICINE CLINIC | Age: 77
End: 2022-10-07

## 2022-10-07 ENCOUNTER — OFFICE VISIT (OUTPATIENT)
Dept: FAMILY MEDICINE CLINIC | Age: 77
End: 2022-10-07
Payer: MEDICARE

## 2022-10-07 VITALS
BODY MASS INDEX: 33.12 KG/M2 | HEART RATE: 61 BPM | TEMPERATURE: 97.8 F | SYSTOLIC BLOOD PRESSURE: 110 MMHG | OXYGEN SATURATION: 97 % | DIASTOLIC BLOOD PRESSURE: 60 MMHG | WEIGHT: 169.6 LBS | RESPIRATION RATE: 14 BRPM

## 2022-10-07 DIAGNOSIS — R10.13 DYSPEPSIA: ICD-10-CM

## 2022-10-07 DIAGNOSIS — E03.9 ACQUIRED HYPOTHYROIDISM: ICD-10-CM

## 2022-10-07 DIAGNOSIS — E23.0 PANHYPOPITUITARISM (HCC): ICD-10-CM

## 2022-10-07 DIAGNOSIS — G47.9 SLEEP DISORDER: ICD-10-CM

## 2022-10-07 DIAGNOSIS — I48.91 ATRIAL FIBRILLATION, UNSPECIFIED TYPE (HCC): Primary | ICD-10-CM

## 2022-10-07 DIAGNOSIS — E78.00 PURE HYPERCHOLESTEROLEMIA: ICD-10-CM

## 2022-10-07 PROCEDURE — 1123F ACP DISCUSS/DSCN MKR DOCD: CPT | Performed by: FAMILY MEDICINE

## 2022-10-07 PROCEDURE — 99214 OFFICE O/P EST MOD 30 MIN: CPT | Performed by: FAMILY MEDICINE

## 2022-10-07 SDOH — ECONOMIC STABILITY: FOOD INSECURITY: WITHIN THE PAST 12 MONTHS, YOU WORRIED THAT YOUR FOOD WOULD RUN OUT BEFORE YOU GOT MONEY TO BUY MORE.: NEVER TRUE

## 2022-10-07 SDOH — ECONOMIC STABILITY: FOOD INSECURITY: WITHIN THE PAST 12 MONTHS, THE FOOD YOU BOUGHT JUST DIDN'T LAST AND YOU DIDN'T HAVE MONEY TO GET MORE.: NEVER TRUE

## 2022-10-07 ASSESSMENT — PATIENT HEALTH QUESTIONNAIRE - PHQ9
4. FEELING TIRED OR HAVING LITTLE ENERGY: 3
SUM OF ALL RESPONSES TO PHQ QUESTIONS 1-9: 9
6. FEELING BAD ABOUT YOURSELF - OR THAT YOU ARE A FAILURE OR HAVE LET YOURSELF OR YOUR FAMILY DOWN: 0
2. FEELING DOWN, DEPRESSED OR HOPELESS: 2
7. TROUBLE CONCENTRATING ON THINGS, SUCH AS READING THE NEWSPAPER OR WATCHING TELEVISION: 0
9. THOUGHTS THAT YOU WOULD BE BETTER OFF DEAD, OR OF HURTING YOURSELF: 0
SUM OF ALL RESPONSES TO PHQ9 QUESTIONS 1 & 2: 4
SUM OF ALL RESPONSES TO PHQ QUESTIONS 1-9: 9
10. IF YOU CHECKED OFF ANY PROBLEMS, HOW DIFFICULT HAVE THESE PROBLEMS MADE IT FOR YOU TO DO YOUR WORK, TAKE CARE OF THINGS AT HOME, OR GET ALONG WITH OTHER PEOPLE: 0
SUM OF ALL RESPONSES TO PHQ QUESTIONS 1-9: 9
5. POOR APPETITE OR OVEREATING: 0
3. TROUBLE FALLING OR STAYING ASLEEP: 2
1. LITTLE INTEREST OR PLEASURE IN DOING THINGS: 2
8. MOVING OR SPEAKING SO SLOWLY THAT OTHER PEOPLE COULD HAVE NOTICED. OR THE OPPOSITE, BEING SO FIGETY OR RESTLESS THAT YOU HAVE BEEN MOVING AROUND A LOT MORE THAN USUAL: 0
SUM OF ALL RESPONSES TO PHQ QUESTIONS 1-9: 9

## 2022-10-07 ASSESSMENT — SOCIAL DETERMINANTS OF HEALTH (SDOH): HOW HARD IS IT FOR YOU TO PAY FOR THE VERY BASICS LIKE FOOD, HOUSING, MEDICAL CARE, AND HEATING?: NOT HARD AT ALL

## 2022-10-07 NOTE — TELEPHONE ENCOUNTER
Please schedule appt for her per her request.  with Dr. Peng Maki for sleep study for new onset atrial fibrillation. Any day except 10/26 and 11/16.   Thanks

## 2022-11-20 ENCOUNTER — PATIENT MESSAGE (OUTPATIENT)
Dept: FAMILY MEDICINE CLINIC | Age: 77
End: 2022-11-20

## 2022-11-20 RX ORDER — PREDNISONE 1 MG/1
5 TABLET ORAL DAILY
Qty: 180 TABLET | Refills: 1 | Status: SHIPPED | OUTPATIENT
Start: 2022-11-20

## 2022-11-20 RX ORDER — BENZONATATE 200 MG/1
200 CAPSULE ORAL 3 TIMES DAILY PRN
Qty: 21 CAPSULE | Refills: 0 | Status: SHIPPED | OUTPATIENT
Start: 2022-11-20 | End: 2022-11-27

## 2022-11-21 ENCOUNTER — TELEPHONE (OUTPATIENT)
Dept: FAMILY MEDICINE CLINIC | Age: 77
End: 2022-11-21

## 2022-11-21 ENCOUNTER — TELEMEDICINE (OUTPATIENT)
Dept: FAMILY MEDICINE CLINIC | Age: 77
End: 2022-11-21
Payer: MEDICARE

## 2022-11-21 DIAGNOSIS — U07.1 COVID-19 VIRUS INFECTION: Primary | ICD-10-CM

## 2022-11-21 DIAGNOSIS — I48.91 ATRIAL FIBRILLATION, UNSPECIFIED TYPE (HCC): ICD-10-CM

## 2022-11-21 DIAGNOSIS — E27.49 GLUCOCORTICOID DEFICIENCY (HCC): ICD-10-CM

## 2022-11-21 PROCEDURE — 99442 PR PHYS/QHP TELEPHONE EVALUATION 11-20 MIN: CPT | Performed by: FAMILY MEDICINE

## 2022-11-21 RX ORDER — PREDNISONE 1 MG/1
5 TABLET ORAL DAILY
Qty: 180 TABLET | Refills: 1 | Status: CANCELLED | OUTPATIENT
Start: 2022-11-21

## 2022-11-21 NOTE — TELEPHONE ENCOUNTER
From: Enrrique Vasquez  To: Dr. Cody James: 11/20/2022 10:56 AM EST  Subject: COvid - cough- prescriptions    On call physician could not give Sal Rodriguez because she takes Eliquis. 1. Can you something else to help her? 2. Can Evette Nur take  cough pearls you have previously given? 3. Evette Nur is taking extra predisone but is almost out. Can you give her refill or extra pills? Please. 4. Will TRY to make online appointment Monday. 1. May I have prescription for cough? Pearls, syrup, something? Bad cough. (Radha)  2. The on call line kept hanging up on me Saturday. Could not reach a human being. Last week called office & was told to test positive with symptoms before calling back. Now it may be too late for help from [address].    Brainless communication may not cost [employer] as much money, but I hate to think of the human cost.   Thank you,   Royal Ingram (for [employer])

## 2022-11-21 NOTE — PROGRESS NOTES
2022    TELEHEALTH EVALUATION -- Audio/Visual (During ONDEI-61 public health emergency)    HPI:    Braulio Soto (:  1945) has requested an audio/video evaluation for the following concern(s):    The primary encounter diagnosis was COVID-19 virus infection. A diagnosis of Glucocorticoid deficiency ([facility] [address] was also pertinent to this visit. Unable to log on to video; switched to phone. Started to fell ill on Saturday. Yesterday felt like she had a knife in her throat when she woke up. Better as the day went on. Mild rhinorrhea, light headed. Cough is dry. Denies chest pain. Mild dyspnea - feels a bit winded walking across a room. + nausea. No vomiting or diarrhea. Appetite is fine. She does not feel presyncopal.   Denies fever. Does not have a pulse oximeter. She increased the prednisone to 15 mg - this is her emergency dose. Today the ST is manageable. Today she does not feel too bad      Lab Results   Component Value Date/Time     10/11/2021 04:41 AM    K 5.0 10/11/2021 04:41 AM    CL 99 10/11/2021 04:41 AM    CO2 19 10/11/2021 04:41 AM    BUN 12 10/11/2021 04:41 AM    CREATININE 0.9 10/11/2021 04:41 AM    GLUCOSE 112 10/11/2021 04:41 AM    CALCIUM 10.1 10/11/2021 04:41 AM      Lab Results   Component Value Date    WBC 11.5 (H) 10/11/2021    HGB 14.9 10/11/2021    HCT 46.1 10/11/2021    MCV 90.4 10/11/2021     10/11/2021         Review of Systems    Prior to Visit Medications    Medication Sig Taking? Authorizing Provider   benzonatate (TESSALON) 200 MG capsule Take 1 capsule by mouth 3 times daily as needed for Cough Yes Eun Reed MD   predniSONE (DELTASONE) 5 MG tablet Take 1 tablet by mouth daily Increase per endocrinologist's instructions PRN acute illness or stress.  Yes Eun Reed MD   apixaban (ELIQUIS) 5 MG TABS tablet Take 1 tablet by mouth 2 times daily Yes Eun Reed MD   levothyroxine (SYNTHROID) 112 MCG tablet Take 100 mcg by mouth daily Yes Historical Provider, MD   atorvastatin (LIPITOR) 20 MG tablet TAKE ONE TABLET BY MOUTH DAILY Yes Kathie Ghotra MD   Vitamin B Complex-C CAPS Take 1 capsule by mouth daily Yes Kathie Ghotra MD   pantoprazole (PROTONIX) 40 MG tablet Take 1 tablet by mouth daily Yes Kathie Ghotra MD   Cholecalciferol (VITAMIN D3) 50 MCG (2000 UT) CAPS Take 1,000 Units by mouth daily Yes Historical Provider, MD   CALCIUM PO Take by mouth sometimes Yes Historical Provider, MD       Social History     Tobacco Use    Smoking status: Never    Smokeless tobacco: Never   Vaping Use    Vaping Use: Never used   Substance Use Topics    Alcohol use: No    Drug use: No        Allergies   Allergen Reactions    Sulfa Antibiotics      anaphylaxis    Codeine      vomiting    Demerol      nausea    Flurbiprofen Itching    Nsaids Itching and Dermatitis     States that all NSAIDS cause severe itching   ,   Past Medical History:   Diagnosis Date    Anxiety     Depression     GERD (gastroesophageal reflux disease)     History of blood transfusion     Hypothyroidism     Osteoporosis     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    Restless legs syndrome (RLS)     Wears glasses        PHYSICAL EXAMINATION:  [ INSTRUCTIONS:  \"[x]\" Indicates a positive item  \"[]\" Indicates a negative item  -- DELETE ALL ITEMS NOT EXAMINED]      Other pertinent observable physical exam findings-     ASSESSMENT/PLAN:  1. COVID-19 virus infection  Discussed pros and cons of stopping Eliquis to start Paxlovid. She is fully vaccinated so opted to treat symptomatically  She is to call asap if worsening sxs. Continue analgesic, hydration    2. Glucocorticoid deficiency ([facility] [address]  Continue emergency dose steroid per endocrinology advice. 12 minutes spent on visit. Side effects of current medications reviewed and questions answered. Call or return to clinic prn if these symptoms worsen or fail to improve as anticipated.      No follow-ups on file. Enrrique Vasquez, was evaluated through a synchronous (real-time) audio-video encounter. The patient (or guardian if applicable) is aware that this is a billable service, which includes applicable co-pays. This Virtual Visit was conducted with patient's (and/or legal guardian's) consent. The visit was conducted pursuant to the emergency declaration under the [address] [place] [address] and the [employer]. Patient identification was verified, and a caregiver was present when appropriate. The patient was located at Home: [address] [place] [address]. Provider was located at [facility] (Appt Dept): [address] [place] [address]  [address] [facility],  [address] [facility]. Total time spent on this encounter: Not billed by time    --Julianne Baxter MD on 11/21/2022 at 1:12 PM    An electronic signature was used to authenticate this note.

## 2022-11-21 NOTE — TELEPHONE ENCOUNTER
Pt called in she is very sick and has covid Dr. Verde has nothing open until 12/1 and pt really does not want to wait that long to be seen. Pt is asking for a call back to see if Dr. Verde can do anything for her because she cant do an e-visit the [facility] uriel will not allow her to.

## 2022-11-21 NOTE — TELEPHONE ENCOUNTER
Radha - please note the problem with on call. Second complaint in a week that a patient had trouble getting through.   Thanks

## 2023-01-02 PROBLEM — D84.821 IMMUNOSUPPRESSION DUE TO CHRONIC STEROID USE (HCC): Status: ACTIVE | Noted: 2023-01-02

## 2023-01-02 PROBLEM — Z79.52 IMMUNOSUPPRESSION DUE TO CHRONIC STEROID USE (HCC): Status: ACTIVE | Noted: 2023-01-02

## 2023-01-02 PROBLEM — T38.0X5A IMMUNOSUPPRESSION DUE TO CHRONIC STEROID USE (HCC): Status: ACTIVE | Noted: 2023-01-02

## 2023-01-02 NOTE — PROGRESS NOTES
Subjective:      Patient ID: Gregorio Yun 69 y.o. female. The primary encounter diagnosis was Paroxysmal atrial fibrillation ([facility] [address] Diagnoses of Immunosuppression due to chronic steroid use ([address] Glucocorticoid deficiency ([facility] [address] and 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[facility] [address] were also pertinent to this visit. HPI    Afib: hospitalized in September with change in mentation; found to be in rapid afib. Converted to NSR on Cardizem. Developed hypotension. Was switched to Amiodarone. This also dropped her BP so was not discharged on anti-arrhythmic. Was discharged on Eliquis. Had cardiology follow up on 22. Watchman was discussed. She had appt with pulmonology for sleep study discussion on 22. She notes memory concerns since had afib. Cannot remember what her pills are for. Stopped driving, gradually quit. Not because she had trouble driving. Her licence  and she did not renew it. She never drove much. She has not stopped doing anything related to memory; has stopped doing a lot of things due to fatigue. She is independent in ADLs. Mom had memory issues in her mid 62s. Memory loss is gradual over several years; much worse post afib  Diet: does not eat a healthy diet. Is not exercising. Depression:  has been very isolated. Lack of social engagement is an issue. Not suicidal.  Has more depression in the winter. No substance use.  + support system. + mild anxiety. No panic attacks. HA:  has dental infection one week ago and HAs are better. HAs predate dental infection. Onset x few years. HA are frontal, can be unilateral or bilateral.  Dull ache. No nausea. Does not think it causes photophobia or phonophobia. May be a bit worse in the past year. Tylenol helps a bit. HAs occur once a week. Dizzy:  no vertigo. Feels balance is off. Using a cane helps. She does not note unilateral weakness. No numbness feet. Occ feels light headed but this is a different sx.   The balance onset is gradual over a year or more. No falls. Panhypopituitarism/chronic steroid use:  managed by Dr. Shivani Garcia. Is compliant with medications. Last endocrine appt 10/26/22; has follow up 2/8/23. Labs reviewed on Care Everywhere. COVID infection in November.   Complains of feeling tired since had afib, not necessary worse since had COVID>    Lab Results   Component Value Date     (L) 10/11/2021    K 5.0 10/11/2021    CL 99 10/11/2021    CO2 19 (L) 10/11/2021    BUN 12 10/11/2021    CREATININE 0.9 10/11/2021    GLUCOSE 112 (H) 10/11/2021    CALCIUM 10.1 10/11/2021    PROT 7.7 10/11/2021    LABALBU 4.0 10/11/2021    BILITOT 0.4 10/11/2021    ALKPHOS 85 10/11/2021    AST 24 10/11/2021    ALT 16 10/11/2021    LABGLOM >60 10/11/2021    GFRAA >60 10/11/2021    AGRATIO 1.1 10/11/2021    GLOB 3.7 10/11/2021        Labs Renal Latest Ref Rng & Units 10/11/2021 4/10/2018 6/12/2015 3/23/2015 3/18/2015   BUN 7 - 20 mg/dL 12 13 18 12 15   Cr 0.6 - 1.2 mg/dL 0.9 0.9 0.8 1.2 1.0   K 3.5 - 5.1 mmol/L 5.0 4.0 4.0 - 4.7   Na 136 - 145 mmol/L 135(L) 138 128(L) - 142     Lab Results   Component Value Date    CHOL 202 (H) 10/11/2021    CHOL 154 04/10/2018    CHOL 205 (H) 03/18/2013     Lab Results   Component Value Date    TRIG 197 (H) 10/11/2021    TRIG 175 (H) 04/10/2018    TRIG 128 03/18/2013     Lab Results   Component Value Date    HDL 56 10/11/2021    HDL 58 04/10/2018    HDL 63 (H) 03/18/2013     Lab Results   Component Value Date    LDLCALC 107 (H) 10/11/2021    LDLCALC 61 04/10/2018    LDLCALC 117 (H) 03/18/2013     Lab Results   Component Value Date    LABVLDL 39 10/11/2021    LABVLDL 35 04/10/2018    LABVLDL 26 03/18/2013     Lab Results   Component Value Date    CHOLHDLRATIO 4.1 12/02/2010      TSH   Date Value Ref Range Status   09/29/2021 0.005 uIU/mL Final   04/10/2018 <0.01 (L) 0.27 - 4.20 uIU/mL Final   06/23/2015 0.01 uIU/mL Final   10/17/2013 0.01 uIU/mL Final   08/28/2013 <0.01 (L) 0.35 - 5.50 uIU/mL Final           Outpatient Medications Marked as Taking for the 1/6/23 encounter (Office Visit) with India Vaughn MD   Medication Sig Dispense Refill    predniSONE (DELTASONE) 5 MG tablet Take 1 tablet by mouth daily Increase per endocrinologist's instructions PRN acute illness or stress.  180 tablet 1    apixaban (ELIQUIS) 5 MG TABS tablet Take 1 tablet by mouth 2 times daily 180 tablet 1    levothyroxine (SYNTHROID) 112 MCG tablet Take 100 mcg by mouth daily      atorvastatin (LIPITOR) 20 MG tablet TAKE ONE TABLET BY MOUTH DAILY 90 tablet 1    Vitamin B Complex-C CAPS Take 1 capsule by mouth daily  0    pantoprazole (PROTONIX) 40 MG tablet Take 1 tablet by mouth daily 90 tablet 3    Cholecalciferol (VITAMIN D3) 50 MCG (2000 UT) CAPS Take 1,000 Units by mouth daily      CALCIUM PO Take by mouth sometimes          Allergies   Allergen Reactions    Sulfa Antibiotics      anaphylaxis    Codeine      vomiting    Demerol      nausea    Flurbiprofen Itching    Nsaids Itching and Dermatitis     States that all NSAIDS cause severe itching       Patient Active Problem List   Diagnosis    Hypopituitarism (HCC)    Hyperlipemia    Osteoporosis    Reactive depression (situational)    Acromegaly and gigantism (HCC)    Hypothyroidism    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HCC)    Glucocorticoid deficiency (HCC)    Vitamin D deficiency    Osteopenia    Nausea & vomiting    Hiatal hernia    Elevated liver enzymes    Anemia    Memory loss    Folate deficiency    COVID-19 virus infection    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)       Past Medical History:   Diagnosis Date    Anxiety     Depression     GERD (gastroesophageal reflux disease)     History of blood transfusion     Hypothyroidism     Osteoporosis     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    Restless legs syndrome (RLS)     Wears glasses        Past Surgical History:   Procedure Laterality Date    CARPAL TUNNEL RELEASE Bilateral CHOLECYSTECTOMY  05/13/2013    DIAGNOSTIC LAPAROSCOPY,LIGATION OF RIGHT ROUND    HERNIA REPAIR N/A 8/11/2020    ROBOTIC INCISIONAL INCARCERATED HERNIA REPAIR WITH MESH performed by Ton Parker DO at [place]. [address] [place] Final Bilateral     2006 & 2007    [address] [place]    5/6 removed - Dr Ciera Restrepo        Family History   Problem Relation Age of Onset    Diabetes Mother     Pancreatic Cancer Father     No Known Problems Sister     No Known Problems Brother     No Known Problems Maternal Grandmother     No Known Problems Maternal Grandfather     No Known Problems Paternal Grandmother     No Known Problems Paternal Grandfather     No Known Problems Maternal Aunt     No Known Problems Maternal Uncle     No Known Problems Paternal Aunt     No Known Problems Paternal Uncle     No Known Problems Other     Anesth Problems Neg Hx     Malig Hyperten Neg Hx     Hypotension Neg Hx     Malig Hypertherm Neg Hx     Pseudochol. Deficiency Neg Hx        Social History     Tobacco Use    Smoking status: Never    Smokeless tobacco: Never   Vaping Use    Vaping Use: Never used   Substance Use Topics    Alcohol use: No    Drug use: No            Review of Systems  Review of Systems    Objective:   Physical Exam  Vitals:    01/06/23 1305   BP: 114/68   Pulse: 60   Resp: 14   Temp: 97.8 °F (36.6 °C)   TempSrc: Temporal   SpO2: 99%   Weight: 175 lb (79.4 kg)     Wt Readings from Last 3 Encounters:   01/06/23 175 lb (79.4 kg)   01/05/23 173 lb (78.5 kg)   10/07/22 169 lb 9.6 oz (76.9 kg)        Physical Exam    NAD    Skin is warm and dry. No rash. Well hydrated  Alert and oriented x 3. Mood and affect are mildly depressed and anxious. The neck is supple and free of adenopathy or masses, the thyroid is normal without enlargement or nodules. Chest: clear with no wheezes or rales. No retractions, or use of accessory muscles noted. Cardiovascular: PMI is not displaced, and no thrill noted.   Regular rate and rhythm with no rub, murmur or gallop. No peripheral edema. Gait mildly antalgic. Normal finger to nose and heel to shin. Negative Rhomberg. Assessment:       Diagnosis Orders   1. Paroxysmal atrial fibrillation (HCC)  Continue Eliquis and cardiology follow up  Sleep study as planned      2. Immunosuppression due to chronic steroid use [facility])  Continue with Dr Nando Allen. Every 6 mos COVID booster. 3. Glucocorticoid deficiency ([address]  Per Dr. Nando Allen       4. Panhypopituitarism [facility])  Basic Metabolic Panel  Continue with Dr. Nando Allen       5. Memory loss  MRI BRAIN W WO CONTRAST    ALPRAZolam (XANAX) 0.5 MG tablet  May be related to sleep apnea. Rule out MDI        6. Claustrophobia  Vitamin B12      7. Moderate episode of recurrent major depressive disorder (HCC)  escitalopram (LEXAPRO) 5 MG tablet       8. Gait apraxia  - discussed PT referral.  She prefers to do HEP. Link to exercises provided. 9.  HA - likely related to apnea. Monitor. Plan:      Side effects of current medications reviewed and questions answered.    Recheck in 6 weeks or prn

## 2023-01-05 PROBLEM — I48.91 ATRIAL FIBRILLATION (HCC): Status: ACTIVE | Noted: 2023-01-05

## 2023-01-05 PROBLEM — E66.9 NON MORBID OBESITY, UNSPECIFIED OBESITY TYPE: Status: ACTIVE | Noted: 2023-01-05

## 2023-01-06 ENCOUNTER — OFFICE VISIT (OUTPATIENT)
Dept: FAMILY MEDICINE CLINIC | Age: 78
End: 2023-01-06

## 2023-01-06 ENCOUNTER — TELEPHONE (OUTPATIENT)
Dept: SLEEP CENTER | Age: 78
End: 2023-01-06

## 2023-01-06 VITALS
BODY MASS INDEX: 34.18 KG/M2 | DIASTOLIC BLOOD PRESSURE: 68 MMHG | RESPIRATION RATE: 14 BRPM | OXYGEN SATURATION: 99 % | WEIGHT: 175 LBS | TEMPERATURE: 97.8 F | SYSTOLIC BLOOD PRESSURE: 114 MMHG | HEART RATE: 60 BPM

## 2023-01-06 DIAGNOSIS — I48.0 PAROXYSMAL ATRIAL FIBRILLATION (HCC): Primary | ICD-10-CM

## 2023-01-06 DIAGNOSIS — F33.1 MODERATE EPISODE OF RECURRENT MAJOR DEPRESSIVE DISORDER (HCC): ICD-10-CM

## 2023-01-06 DIAGNOSIS — Z79.52 IMMUNOSUPPRESSION DUE TO CHRONIC STEROID USE (HCC): ICD-10-CM

## 2023-01-06 DIAGNOSIS — E27.49 GLUCOCORTICOID DEFICIENCY (HCC): ICD-10-CM

## 2023-01-06 DIAGNOSIS — T38.0X5A IMMUNOSUPPRESSION DUE TO CHRONIC STEROID USE (HCC): ICD-10-CM

## 2023-01-06 DIAGNOSIS — R48.2 GAIT APRAXIA: ICD-10-CM

## 2023-01-06 DIAGNOSIS — F40.240 CLAUSTROPHOBIA: ICD-10-CM

## 2023-01-06 DIAGNOSIS — D84.821 IMMUNOSUPPRESSION DUE TO CHRONIC STEROID USE (HCC): ICD-10-CM

## 2023-01-06 DIAGNOSIS — E23.0 PANHYPOPITUITARISM (HCC): ICD-10-CM

## 2023-01-06 DIAGNOSIS — R51.9 NONINTRACTABLE EPISODIC HEADACHE, UNSPECIFIED HEADACHE TYPE: ICD-10-CM

## 2023-01-06 DIAGNOSIS — R41.3 MEMORY LOSS: ICD-10-CM

## 2023-01-06 RX ORDER — PREDNISONE 1 MG/1
5 TABLET ORAL DAILY
Qty: 180 TABLET | Refills: 1 | Status: SHIPPED | OUTPATIENT
Start: 2023-01-06

## 2023-01-06 RX ORDER — ESCITALOPRAM OXALATE 5 MG/1
5 TABLET ORAL DAILY
Qty: 30 TABLET | Refills: 1 | Status: SHIPPED | OUTPATIENT
Start: 2023-01-06

## 2023-01-06 RX ORDER — ALPRAZOLAM 0.5 MG/1
TABLET ORAL
Qty: 2 TABLET | Refills: 0 | Status: SHIPPED | OUTPATIENT
Start: 2023-01-06 | End: 2023-02-05

## 2023-01-06 RX ORDER — ESCITALOPRAM OXALATE 5 MG/1
5 TABLET ORAL DAILY
Qty: 90 TABLET | OUTPATIENT
Start: 2023-01-06

## 2023-01-06 NOTE — TELEPHONE ENCOUNTER
Called to schedule an hst per Osei Villaseñor     Left  for the pt to return my call     [facility]

## 2023-01-06 NOTE — TELEPHONE ENCOUNTER
Requested Prescriptions     Pending Prescriptions Disp Refills    escitalopram (LEXAPRO) 5 MG tablet [Pharmacy Med Name: ESCITALOPRAM 5MG TABLETS] 90 tablet      Sig: TAKE 1 TABLET BY MOUTH DAILY     Last ov 1/6/2023      Pt requesting 90 day supply

## 2023-01-10 ENCOUNTER — TELEPHONE (OUTPATIENT)
Dept: FAMILY MEDICINE CLINIC | Age: 78
End: 2023-01-10

## 2023-01-10 NOTE — TELEPHONE ENCOUNTER
Patient has been having diarrhea and vomiting since Sunday. Several times a day. She tried Zofran with no relief. She has not been able eat. She has some water. Cramps with diarrhea. No blood in diarrhea. No fever. Slight headache yesterday. Patient's sister wants to know what else she can do. Please call to advise.     Thank you

## 2023-01-17 ENCOUNTER — TELEPHONE (OUTPATIENT)
Dept: FAMILY MEDICINE CLINIC | Age: 78
End: 2023-01-17

## 2023-01-17 NOTE — TELEPHONE ENCOUNTER
Spoke to pt. And states she has been on antibiotics and no help. [facility]'ed pt an appt. Offered tomorrow, but pt can not come in until Friday.

## 2023-01-17 NOTE — TELEPHONE ENCOUNTER
Patient has had urinary frequency and burning since last week. She has tried Uticare with some relief. Does she need to come in? Or can she just drop off a sample?     Please call patient to advise    Thank you

## 2023-01-18 ENCOUNTER — HOSPITAL ENCOUNTER (OUTPATIENT)
Dept: SLEEP CENTER | Age: 78
Discharge: HOME | End: 2023-01-18
Payer: MEDICARE

## 2023-01-18 DIAGNOSIS — G47.10 HYPERSOMNIA: ICD-10-CM

## 2023-01-18 DIAGNOSIS — R06.83 SNORING: ICD-10-CM

## 2023-01-18 PROCEDURE — 95806 SLEEP STUDY UNATT&RESP EFFT: CPT

## 2023-01-20 ENCOUNTER — OFFICE VISIT (OUTPATIENT)
Dept: FAMILY MEDICINE CLINIC | Age: 78
End: 2023-01-20

## 2023-01-20 VITALS
SYSTOLIC BLOOD PRESSURE: 118 MMHG | WEIGHT: 168.8 LBS | OXYGEN SATURATION: 98 % | RESPIRATION RATE: 12 BRPM | DIASTOLIC BLOOD PRESSURE: 70 MMHG | HEIGHT: 62 IN | HEART RATE: 74 BPM | BODY MASS INDEX: 31.06 KG/M2 | TEMPERATURE: 96.8 F

## 2023-01-20 DIAGNOSIS — F33.1 MODERATE EPISODE OF RECURRENT MAJOR DEPRESSIVE DISORDER (HCC): Primary | ICD-10-CM

## 2023-01-20 DIAGNOSIS — G47.33 OBSTRUCTIVE SLEEP APNEA SYNDROME: ICD-10-CM

## 2023-01-20 DIAGNOSIS — R41.3 MEMORY LOSS: ICD-10-CM

## 2023-01-20 ASSESSMENT — PATIENT HEALTH QUESTIONNAIRE - PHQ9
6. FEELING BAD ABOUT YOURSELF - OR THAT YOU ARE A FAILURE OR HAVE LET YOURSELF OR YOUR FAMILY DOWN: 0
5. POOR APPETITE OR OVEREATING: 0
7. TROUBLE CONCENTRATING ON THINGS, SUCH AS READING THE NEWSPAPER OR WATCHING TELEVISION: 0
4. FEELING TIRED OR HAVING LITTLE ENERGY: 0
3. TROUBLE FALLING OR STAYING ASLEEP: 0
9. THOUGHTS THAT YOU WOULD BE BETTER OFF DEAD, OR OF HURTING YOURSELF: 0
2. FEELING DOWN, DEPRESSED OR HOPELESS: 0
10. IF YOU CHECKED OFF ANY PROBLEMS, HOW DIFFICULT HAVE THESE PROBLEMS MADE IT FOR YOU TO DO YOUR WORK, TAKE CARE OF THINGS AT HOME, OR GET ALONG WITH OTHER PEOPLE: 0
8. MOVING OR SPEAKING SO SLOWLY THAT OTHER PEOPLE COULD HAVE NOTICED. OR THE OPPOSITE, BEING SO FIGETY OR RESTLESS THAT YOU HAVE BEEN MOVING AROUND A LOT MORE THAN USUAL: 0
SUM OF ALL RESPONSES TO PHQ QUESTIONS 1-9: 0
1. LITTLE INTEREST OR PLEASURE IN DOING THINGS: 0
SUM OF ALL RESPONSES TO PHQ9 QUESTIONS 1 & 2: 0

## 2023-01-20 NOTE — PROCEDURES
Electronically signed by Brittanie Mcneil MD on 1/20/23 at 2:50 PM EST 4 = No assist / stand by assistance

## 2023-01-20 NOTE — PROGRESS NOTES
Subjective:      Patient ID: Teressa Milian 69 y.o. female. The primary encounter diagnosis was Dysuria. Diagnoses of Moderate episode of recurrent major depressive disorder ([address] and Memory loss were also pertinent to this visit. HPI        Depression with memory loss:  started on Lexpro on 1/6/23 thinking the depression was contributing to the memory loss. Her mom had memory loss in her mid 62s. She is being evaluated by DR. Phi Collins for a sleep disorder that may be contributing as well. Sleep study showed severe sleep apnea. She does not notice any difference in mood with Lexapro. She does not think she is depressed. Just sleepy and tired.      Lab Results   Component Value Date     (L) 10/11/2021    K 5.0 10/11/2021    CL 99 10/11/2021    CO2 19 (L) 10/11/2021    BUN 12 10/11/2021    CREATININE 0.9 10/11/2021    GLUCOSE 112 (H) 10/11/2021    CALCIUM 10.1 10/11/2021    PROT 7.7 10/11/2021    LABALBU 4.0 10/11/2021    BILITOT 0.4 10/11/2021    ALKPHOS 85 10/11/2021    AST 24 10/11/2021    ALT 16 10/11/2021    LABGLOM >60 10/11/2021    GFRAA >60 10/11/2021    AGRATIO 1.1 10/11/2021    GLOB 3.7 10/11/2021        Lab Results   Component Value Date    WBC 11.5 (H) 10/11/2021    HGB 14.9 10/11/2021    HCT 46.1 10/11/2021    MCV 90.4 10/11/2021     10/11/2021          No outpatient medications have been marked as taking for the 1/20/23 encounter (Appointment) with Ramon Daniels MD.        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[address]    Glucocorticoid deficiency ([address]    Vitamin D deficiency Osteopenia    Nausea & vomiting    Hiatal hernia    Elevated liver enzymes    Anemia    Memory loss    Folate deficiency    COVID-19 virus infection    Immunosuppression due to chronic steroid use ([facility]    Atrial fibrillation ([facility]    Non morbid obesity, unspecified obesity type    Moderate episode of recurrent major depressive disorder ([facility]       Past Medical History:   Diagnosis Date    Anxiety     Depression     GERD (gastroesophageal reflux disease)     History of blood transfusion     Hypothyroidism     Osteoporosis     Pituitary abnormality ([facility]     Benign Tumor - acromegaly    Restless legs syndrome (RLS)     Wears glasses        Past Surgical History:   Procedure Laterality Date    CARPAL TUNNEL RELEASE Bilateral     CHOLECYSTECTOMY  05/13/2013    DIAGNOSTIC LAPAROSCOPY,LIGATION OF RIGHT ROUND    HERNIA REPAIR N/A 8/11/2020    ROBOTIC INCISIONAL INCARCERATED HERNIA REPAIR WITH MESH performed by Magdaleno Rowe DO at [place]. [address] Final Bilateral     2006 & 2007    [address]    5/6 removed - Dr Yamile Bean        Family History   Problem Relation Age of Onset    Diabetes Mother     Pancreatic Cancer Father     No Known Problems Sister     No Known Problems Brother     No Known Problems Maternal Grandmother     No Known Problems Maternal Grandfather     No Known Problems Paternal Grandmother     No Known Problems Paternal Grandfather     No Known Problems Maternal Aunt     No Known Problems Maternal Uncle     No Known Problems Paternal Aunt     No Known Problems Paternal Uncle     No Known Problems Other     Anesth Problems Neg Hx     Malig Hyperten Neg Hx     Hypotension Neg Hx     Malig Hypertherm Neg Hx     Pseudochol.  Deficiency Neg Hx        Social History     Tobacco Use    Smoking status: Never    Smokeless tobacco: Never   Vaping Use    Vaping Use: Never used   Substance Use Topics    Alcohol use: No    Drug use: No            Review of Systems  Review of Systems    Objective: Physical Exam  Vitals:    01/20/23 1642   BP: 118/70   Pulse: 74   Resp: 12   Temp: 96.8 °F (36 °C)   TempSrc: Temporal   SpO2: 98%   Weight: 168 lb 12.8 oz (76.6 kg)   Height: 5' 1.5\" (1.562 m)       Physical Exam  NAD  Skin is warm and dry. Mood and affect are normal.  No agitation or psychomotor retardation. No pressured speech, grandiosity or tangential thoughts. Insight and judgement are intact. Not suicidal.   Chest is clear, no wheezing or rales. Normal symmetric air entry throughout both lung fields. Heart regular with normal rate, no murmer or gallop       Assessment:       Diagnosis Orders   1. Moderate episode of recurrent major depressive disorder (HCC)  Stop Lexapro per her preference and re-evaluate after treating apnea      2. Memory loss  Re-evaluate after treating apnea      3. Obstructive sleep apnea syndrome  Agustin Bangura MD, Sleep Medicine, [facility]  She will call for CPAP initiation             Plan:      Side effects of current medications reviewed and questions answered. Follow up in 3 months or prn.

## 2023-01-23 ENCOUNTER — TELEPHONE (OUTPATIENT)
Dept: PULMONOLOGY | Age: 78
End: 2023-01-23

## 2023-01-23 NOTE — PROGRESS NOTES
Maira Romero         : 1945 [facility]    Diagnosis: [x] WASHINGTON (G47.33) [] CSA (G47.31) [] Apnea (G47.30)   Length of Need: [x] 18 Months [] 99 Months [] Other:    Machine (SANDRA!): [] Respironics Dream Station   2   Auto [x] ResMed AirSense     Auto S11 [] Other:     [x]  CPAP () [] Bilevel ()   Mode: [x] Auto [] Spontaneous    Mode: [] Auto [] Spontaneous      P min 7 cmH2O  P max 17 cmH2O      Comfort Settings:   - Ramp Pressure: 4 cmH2O                                        - Ramp time: 15 min                                     -  Flex/EPR - 3 full time                                    - For ResMed Bilevel (TiMax-4 sec   TiMin- 0.2 sec)     Humidifier: [x] Heated ()        [x] Water chamber replacement ()/ 1 per 6 months        Mask:   [x] Nasal () /1 per 3 months [] Full Face () /1 per 3 months   [x] Patient choice -Size and fit mask [] Patient Choice - Size and fit mask   [] Dispense:  [] Dispense:    [x] Headgear () / 1 per 3 months [] Headgear () / 1 per 3 months   [x] Replacement Nasal Cushion ()/2 per month [] Interface Replacement ()/1 per month   [] Replacement Nasal Pillows ()/2 per month         Tubing: [x] Heated ()/1 per 3 months    [] Standard ()/1 per 3 months [] Other:           Filters: [x] Non-disposable ()/1 per 6 months     [x] Ultra-Fine, Disposable ()/2 per month        Miscellaneous: [] Chin Strap ()/ 1 per 6 months [] O2 bleed-in:       LPM   [] Oximetry on CPAP/Bilevel []  Other:    [x] Modem: ()         Start Order Date: 23    MEDICAL JUSTIFICATION:  I, the undersigned, certify that the above prescribed supplies are medically necessary for this patients wellbeing. In my opinion, the supplies are both reasonable and necessary in reference to accepted standards of medicalpractice in treatment of this patients condition.     Immanuel Helton MD      NPI: [id number]       Order Signed Date: 23    Electronically signed by BACILIO DOTY MD on 2023 at 2:18 PM    Lexi Romero  1945  [address] [facility] [address]  [phone] (home) [phone] (work)  [phone] (mobile)      Insurance Info (confirm with patient if correct):  Payer/Plan Subscr  Sex Relation Sub. Ins. ID Effective Group Num

## 2023-01-23 NOTE — PROGRESS NOTES
Donnie Gege Romero         : 1945 [facility]    Diagnosis: [x] WASHINGTON (G47.33) [] CSA (G47.31) [] Apnea (G47.30)   Length of Need: [x] 18 Months [] 99 Months [] Other:    Machine (SANDRA!): [] Respironics Dream Station   2   Auto [x] ResMed AirSense     Auto S11 [] Other:     [x]  CPAP () [] Bilevel ()   Mode: [x] Auto [] Spontaneous    Mode: [] Auto [] Spontaneous      P min 7 cmH2O  P max [id number]      Comfort Settings:   - Ramp Pressure: 5 cmH2O                                        - Ramp time: 15 min                                     -  Flex/EPR - 3 full time                                    - For ResMed Bilevel (TiMax-4 sec   TiMin- 0.2 sec)     Humidifier: [x] Heated ()        [x] Water chamber replacement ()/ 1 per 6 months        Mask:   [] Nasal () /1 per 3 months [x] Full Face () /1 per 3 months   [] Patient choice -Size and fit mask [x] Patient Choice - Size and fit mask   [] Dispense:  [] Dispense:    [] Headgear () / 1 per 3 months [x] Headgear () / 1 per 3 months   [] Replacement Nasal Cushion ()/2 per month [x] Interface Replacement ()/1 per month   [] Replacement Nasal Pillows ()/2 per month         Tubing: [x] Heated ()/1 per 3 months    [] Standard ()/1 per 3 months [] Other:           Filters: [x] Non-disposable ()/1 per 6 months     [x] Ultra-Fine, Disposable ()/2 per month        Miscellaneous: [] Chin Strap ()/ 1 per 6 months [] O2 bleed-in:       LPM   [] Oximetry on CPAP/Bilevel []  Other:    [x] Modem: ()         Start Order Date: 23    MEDICAL JUSTIFICATION:  I, the undersigned, certify that the above prescribed supplies are medically necessary for this patients wellbeing. In my opinion, the supplies are both reasonable and necessary in reference to accepted standards of medicalpractice in treatment of this patients condition.     Jimbo Rowell MD      NPI: [id number]       Order Signed Date: 23    Electronically signed by Tania Minaya MD on 2023 at 3:20 PM    [address]  1945  [address] [address]  [phone] (home) [phone] (work)  [phone] (mobile)      Insurance Info (confirm with patient if correct):  Payer/Plan Subscr  Sex Relation Sub.  Ins. ID Effective Group Num

## 2023-02-01 DIAGNOSIS — I48.91 ATRIAL FIBRILLATION, UNSPECIFIED TYPE (HCC): Primary | ICD-10-CM

## 2023-02-12 PROBLEM — G47.33 OSA (OBSTRUCTIVE SLEEP APNEA): Status: ACTIVE | Noted: 2023-02-12

## 2023-02-13 ENCOUNTER — TELEPHONE (OUTPATIENT)
Dept: PULMONOLOGY | Age: 78
End: 2023-02-13

## 2023-02-13 NOTE — TELEPHONE ENCOUNTER
Pt called in stating she was told by [employer] they would be shipping her machine out a bit ago but she hasn't received anything and can't get a hold of them. Please advise, Pt would like phone call back.   Ph. [phone]

## 2023-02-15 NOTE — TELEPHONE ENCOUNTER
Pt LM stating she can't get a hold of [employer] and she paid for the machine already and hasn't received it and isn't getting oxygen at night.     Ph. [phone]

## 2023-02-15 NOTE — TELEPHONE ENCOUNTER
Spoke with Tez([facility]) and she said the machine is sent on 02/13/23, patient will received it in a week, patient is advised and also patient needs to contact [facility] in case she needs any help to set up her machine.

## 2023-03-25 DIAGNOSIS — F33.1 MODERATE EPISODE OF RECURRENT MAJOR DEPRESSIVE DISORDER (HCC): ICD-10-CM

## 2023-03-27 RX ORDER — ESCITALOPRAM OXALATE 5 MG/1
5 TABLET ORAL DAILY
Qty: 90 TABLET | Refills: 5 | OUTPATIENT
Start: 2023-03-27

## 2023-05-08 DIAGNOSIS — G47.33 OSA (OBSTRUCTIVE SLEEP APNEA): ICD-10-CM

## 2023-05-08 DIAGNOSIS — E89.0 POSTOPERATIVE HYPOTHYROIDISM: Chronic | ICD-10-CM

## 2023-05-08 DIAGNOSIS — R41.3 MEMORY LOSS: ICD-10-CM

## 2023-05-08 DIAGNOSIS — R73.02 IMPAIRED GLUCOSE TOLERANCE: ICD-10-CM

## 2023-05-08 DIAGNOSIS — E23.0 PANHYPOPITUITARISM (HCC): ICD-10-CM

## 2023-05-08 DIAGNOSIS — E78.00 PURE HYPERCHOLESTEROLEMIA: Chronic | ICD-10-CM

## 2023-05-08 LAB
DEPRECATED RDW RBC AUTO: 16.1 % (ref 12.4–15.4)
HCT VFR BLD AUTO: 40.6 % (ref 36–48)
HGB BLD-MCNC: 13.1 G/DL (ref 12–16)
MCH RBC QN AUTO: 27.9 PG (ref 26–34)
MCHC RBC AUTO-ENTMCNC: 32.2 G/DL (ref 31–36)
MCV RBC AUTO: 86.6 FL (ref 80–100)
PLATELET # BLD AUTO: 254 K/UL (ref 135–450)
PMV BLD AUTO: 10.7 FL (ref 5–10.5)
RBC # BLD AUTO: 4.69 M/UL (ref 4–5.2)
T4 FREE SERPL-MCNC: 2.8 NG/DL (ref 0.9–1.8)
TSH SERPL DL<=0.005 MIU/L-ACNC: <0.01 UIU/ML (ref 0.27–4.2)
WBC # BLD AUTO: 10.9 K/UL (ref 4–11)

## 2023-05-09 DIAGNOSIS — M81.8 OTHER OSTEOPOROSIS WITHOUT CURRENT PATHOLOGICAL FRACTURE: Primary | ICD-10-CM

## 2023-05-09 LAB
ALBUMIN SERPL-MCNC: 4.3 G/DL (ref 3.4–5)
ALBUMIN/GLOB SERPL: 1.3 {RATIO} (ref 1.1–2.2)
ALP SERPL-CCNC: 78 U/L (ref 40–129)
ALT SERPL-CCNC: 11 U/L (ref 10–40)
ANION GAP SERPL CALCULATED.3IONS-SCNC: 11 MMOL/L (ref 3–16)
AST SERPL-CCNC: 18 U/L (ref 15–37)
BILIRUB SERPL-MCNC: 0.4 MG/DL (ref 0–1)
BUN SERPL-MCNC: 10 MG/DL (ref 7–20)
CALCIUM SERPL-MCNC: 10.1 MG/DL (ref 8.3–10.6)
CHLORIDE SERPL-SCNC: 105 MMOL/L (ref 99–110)
CHOLEST SERPL-MCNC: 159 MG/DL (ref 0–199)
CO2 SERPL-SCNC: 25 MMOL/L (ref 21–32)
CREAT SERPL-MCNC: 1 MG/DL (ref 0.6–1.2)
EST. AVERAGE GLUCOSE BLD GHB EST-MCNC: 119.8 MG/DL
GFR SERPLBLD CREATININE-BSD FMLA CKD-EPI: 58 ML/MIN/{1.73_M2}
GLUCOSE SERPL-MCNC: 125 MG/DL (ref 70–99)
HBA1C MFR BLD: 5.8 %
HDLC SERPL-MCNC: 57 MG/DL (ref 40–60)
LDLC SERPL CALC-MCNC: 82 MG/DL
POTASSIUM SERPL-SCNC: 5.4 MMOL/L (ref 3.5–5.1)
PROT SERPL-MCNC: 7.5 G/DL (ref 6.4–8.2)
SODIUM SERPL-SCNC: 141 MMOL/L (ref 136–145)
TRIGL SERPL-MCNC: 99 MG/DL (ref 0–150)
VIT B12 SERPL-MCNC: 1098 PG/ML (ref 211–911)
VLDLC SERPL CALC-MCNC: 20 MG/DL

## 2023-05-09 RX ORDER — EPINEPHRINE 1 MG/ML
0.3 INJECTION, SOLUTION INTRAMUSCULAR; SUBCUTANEOUS PRN
OUTPATIENT
Start: 2023-05-09

## 2023-05-09 RX ORDER — ALBUTEROL SULFATE 90 UG/1
4 AEROSOL, METERED RESPIRATORY (INHALATION) PRN
OUTPATIENT
Start: 2023-05-09

## 2023-05-09 RX ORDER — SODIUM CHLORIDE 9 MG/ML
INJECTION, SOLUTION INTRAVENOUS CONTINUOUS
OUTPATIENT
Start: 2023-05-09

## 2023-05-09 RX ORDER — HEPARIN SODIUM (PORCINE) LOCK FLUSH IV SOLN 100 UNIT/ML 100 UNIT/ML
500 SOLUTION INTRAVENOUS PRN
OUTPATIENT
Start: 2023-05-09

## 2023-05-09 RX ORDER — SODIUM CHLORIDE 0.9 % (FLUSH) 0.9 %
5-40 SYRINGE (ML) INJECTION PRN
OUTPATIENT
Start: 2023-05-09

## 2023-05-09 RX ORDER — ZOLEDRONIC ACID 5 MG/100ML
5 INJECTION, SOLUTION INTRAVENOUS ONCE
OUTPATIENT
Start: 2023-05-09 | End: 2023-05-09

## 2023-05-09 RX ORDER — SODIUM CHLORIDE 9 MG/ML
5-250 INJECTION, SOLUTION INTRAVENOUS PRN
OUTPATIENT
Start: 2023-05-09

## 2023-05-09 RX ORDER — DIPHENHYDRAMINE HYDROCHLORIDE 50 MG/ML
50 INJECTION INTRAMUSCULAR; INTRAVENOUS
OUTPATIENT
Start: 2023-05-09

## 2023-05-09 RX ORDER — ONDANSETRON 2 MG/ML
8 INJECTION INTRAMUSCULAR; INTRAVENOUS
OUTPATIENT
Start: 2023-05-09

## 2023-05-09 RX ORDER — ACETAMINOPHEN 325 MG/1
650 TABLET ORAL
OUTPATIENT
Start: 2023-05-09

## 2023-05-13 ENCOUNTER — HOSPITAL ENCOUNTER (OUTPATIENT)
Dept: MRI IMAGING | Age: 78
Discharge: HOME | End: 2023-05-13
Payer: MEDICARE

## 2023-05-13 DIAGNOSIS — R41.3 MEMORY LOSS: ICD-10-CM

## 2023-05-13 DIAGNOSIS — H91.92 HEARING LOSS OF LEFT EAR, UNSPECIFIED HEARING LOSS TYPE: ICD-10-CM

## 2023-05-13 DIAGNOSIS — E23.0 PANHYPOPITUITARISM (HCC): ICD-10-CM

## 2023-05-13 PROCEDURE — 70553 MRI BRAIN STEM W/O & W/DYE: CPT

## 2023-05-13 PROCEDURE — 6360000004 HC RX CONTRAST MEDICATION: Performed by: FAMILY MEDICINE

## 2023-05-13 PROCEDURE — A9576 INJ PROHANCE MULTIPACK: HCPCS | Performed by: FAMILY MEDICINE

## 2023-05-13 RX ADMIN — GADOTERIDOL 15 ML: 279.3 INJECTION, SOLUTION INTRAVENOUS at 15:23

## 2023-05-15 ENCOUNTER — HOSPITAL ENCOUNTER (OUTPATIENT)
Dept: ONCOLOGY | Age: 78
Setting detail: INFUSION SERIES
Discharge: HOME | End: 2023-05-15
Payer: MEDICARE

## 2023-05-15 VITALS
HEART RATE: 68 BPM | RESPIRATION RATE: 18 BRPM | DIASTOLIC BLOOD PRESSURE: 72 MMHG | OXYGEN SATURATION: 100 % | TEMPERATURE: 98.3 F | SYSTOLIC BLOOD PRESSURE: 110 MMHG

## 2023-05-15 DIAGNOSIS — M81.8 OTHER OSTEOPOROSIS WITHOUT CURRENT PATHOLOGICAL FRACTURE: Primary | ICD-10-CM

## 2023-05-15 PROCEDURE — 6360000002 HC RX W HCPCS: Performed by: INTERNAL MEDICINE

## 2023-05-15 PROCEDURE — 96365 THER/PROPH/DIAG IV INF INIT: CPT

## 2023-05-15 RX ORDER — ACETAMINOPHEN 325 MG/1
650 TABLET ORAL
OUTPATIENT
Start: 2024-05-12

## 2023-05-15 RX ORDER — ONDANSETRON 2 MG/ML
8 INJECTION INTRAMUSCULAR; INTRAVENOUS
OUTPATIENT
Start: 2024-05-12

## 2023-05-15 RX ORDER — SODIUM CHLORIDE 9 MG/ML
5-250 INJECTION, SOLUTION INTRAVENOUS PRN
OUTPATIENT
Start: 2024-05-12

## 2023-05-15 RX ORDER — ALBUTEROL SULFATE 90 UG/1
4 AEROSOL, METERED RESPIRATORY (INHALATION) PRN
OUTPATIENT
Start: 2024-05-12

## 2023-05-15 RX ORDER — ZOLEDRONIC ACID 5 MG/100ML
5 INJECTION, SOLUTION INTRAVENOUS ONCE
OUTPATIENT
Start: 2024-05-12 | End: 2024-05-12

## 2023-05-15 RX ORDER — HEPARIN SODIUM (PORCINE) LOCK FLUSH IV SOLN 100 UNIT/ML 100 UNIT/ML
500 SOLUTION INTRAVENOUS PRN
OUTPATIENT
Start: 2024-05-12

## 2023-05-15 RX ORDER — EPINEPHRINE 1 MG/ML
0.3 INJECTION, SOLUTION INTRAMUSCULAR; SUBCUTANEOUS PRN
OUTPATIENT
Start: 2024-05-12

## 2023-05-15 RX ORDER — SODIUM CHLORIDE 0.9 % (FLUSH) 0.9 %
5-40 SYRINGE (ML) INJECTION PRN
OUTPATIENT
Start: 2024-05-12

## 2023-05-15 RX ORDER — SODIUM CHLORIDE 9 MG/ML
5-250 INJECTION, SOLUTION INTRAVENOUS PRN
Status: DISCONTINUED | OUTPATIENT
Start: 2023-05-15 | End: 2023-05-16 | Stop reason: HOSPADM

## 2023-05-15 RX ORDER — DIPHENHYDRAMINE HYDROCHLORIDE 50 MG/ML
50 INJECTION INTRAMUSCULAR; INTRAVENOUS
OUTPATIENT
Start: 2024-05-12

## 2023-05-15 RX ORDER — SODIUM CHLORIDE 9 MG/ML
INJECTION, SOLUTION INTRAVENOUS CONTINUOUS
OUTPATIENT
Start: 2024-05-12

## 2023-05-15 RX ORDER — ZOLEDRONIC ACID 5 MG/100ML
5 INJECTION, SOLUTION INTRAVENOUS ONCE
Status: COMPLETED | OUTPATIENT
Start: 2023-05-15 | End: 2023-05-15

## 2023-05-15 RX ADMIN — ZOLEDRONIC ACID 5 MG: 5 INJECTION, SOLUTION INTRAVENOUS at 11:37

## 2023-05-15 NOTE — PROGRESS NOTES
Pt seen and assessed at 0 [facility] for Reclast infusion per orders from Dr. Juvenal Fry. Infused per [facility] policy. Monitoring completed for infusion reactions - see flowsheet. Pt tolerated infusion well and without incident. Pt verbalizes understanding of discharge instructions. Discharged ambulatory to home with family. IV removed, no complications.  Electronically signed by Bhaskar Sherman RN on 5/15/2023 at 11:50 AM

## 2023-06-09 ENCOUNTER — PROCEDURE VISIT (OUTPATIENT)
Dept: AUDIOLOGY | Age: 78
End: 2023-06-09

## 2023-06-09 ENCOUNTER — OFFICE VISIT (OUTPATIENT)
Dept: ENT CLINIC | Age: 78
End: 2023-06-09

## 2023-06-09 VITALS
HEART RATE: 73 BPM | BODY MASS INDEX: 30.89 KG/M2 | HEIGHT: 61 IN | TEMPERATURE: 98.4 F | DIASTOLIC BLOOD PRESSURE: 86 MMHG | SYSTOLIC BLOOD PRESSURE: 135 MMHG

## 2023-06-09 DIAGNOSIS — H90.3 SENSORINEURAL HEARING LOSS, BILATERAL: Primary | ICD-10-CM

## 2023-06-09 DIAGNOSIS — H61.23 BILATERAL IMPACTED CERUMEN: Primary | ICD-10-CM

## 2023-06-09 NOTE — PROGRESS NOTES
not apply route daily 100 each 3    zoster recombinant adjuvanted vaccine (SHINGRIX) 50 MCG/0.5ML SUSR injection Inject 0.5 mLs into the muscle See Admin Instructions 0.5 mL 0    pantoprazole (PROTONIX) 40 MG tablet Take 1 tablet by mouth daily 90 tablet 1    predniSONE (DELTASONE) 5 MG tablet Take 1 tablet by mouth daily Increase per endocrinologist's instructions PRN acute illness or stress. 180 tablet 1    apixaban (ELIQUIS) 5 MG TABS tablet Take 1 tablet by mouth 2 times daily 180 tablet 1    atorvastatin (LIPITOR) 20 MG tablet Take 1 tablet by mouth daily 90 tablet 1    Vitamin B Complex-C CAPS Take 1 capsule by mouth daily  0    Cholecalciferol (VITAMIN D3) 50 MCG (2000 UT) CAPS Take 1,000 Units by mouth daily      CALCIUM PO Take by mouth sometimes      zoledronic acid (RECLAST) 5 MG/100ML SOLN Infuse 100 mLs intravenously once for 1 dose 100 mL 0     No current facility-administered medications for this visit. Review of Systems     Review of Systems      PhysicalExam     Vitals:    06/09/23 1409   BP: 135/86   Pulse: 73   Temp: 98.4 °F (36.9 °C)       Physical Exam      Procedure     Removal Impacted Cerumen    An operating microscope was utilized to visualize the external auditory canals using a 4mm speculum. Cerumen was removed with curettes and denney suctions on both sides. The tympanic membrane is intact. No fluid visualized in the middle ear. No complications. Assessment and Plan     1. Bilateral impacted cerumen  Bilateral impacted cerumen removed with instruments. Cleared for audiological evaluation. Return if symptoms worsen or fail to improve. [ ] Review/order radiology tests   [ ] Independent interpretation of diagnostic test by another provider  [ ] Discussed case with another provider  [ ] High risk of loss of major body function  [ ] Elective major surgery with risk factors    Portions of this note were dictated using Dragon.  There may be linguistic errors secondary

## 2023-06-09 NOTE — PATIENT INSTRUCTIONS
Hearing Loss: Care Instructions  Your Care Instructions      Hearing loss is a sudden or slow decrease in how well you hear. It can range from mild to profound. Permanent hearing loss can occur with aging, and it can happen when you are exposed long-term to loud noise. Examples include listening to loud music, riding motorcycles, or being around other loud machines. Hearing loss can affect your work and home life. It can make you feel lonely or depressed. You may feel that you have lost your independence. But hearing aids and other devices can help you hear better and feel connected to others. Follow-up care is a key part of your treatment and safety. Be sure to make and go to all appointments, and call your doctor if you are having problems. It's also a good idea to know your test results and keep a list of the medicines you take. How can you care for yourself at home? Avoid loud noises whenever possible. This helps keep your hearing from getting worse. Always wear hearing protection around loud noises. If appropriate, wear hearing aid(s) as directed. It is recommended that hearing aids are worn during all waking hours to keep your brain active and give it access to the sounds it is missing. If you are beginning your process with hearing aid(s), schedule a \"Hearing Aid Evaluation\" with an audiologist to discuss your lifestyle, features of hearing aid technology, and styles of hearing aids available. It is recommended that you contact your insurance company to determine if you have a hearing aid benefit, as this may dictate who you can see for these services. Have hearing tests as your doctor suggests. They can show whether your hearing has changed. Your hearing aid may need to be adjusted. Use other assistive devices as needed. These may include:  Telephone amplifiers and hearing aids that can connect to a television, stereo, radio, or microphone. Devices that use lights or vibrations.  These

## 2023-06-09 NOTE — PROGRESS NOTES
Josephine Alfaro   1945, 69 y.o. female   [id number]       Referring Provider: Tutu Burnette MD   Referral Type: In an order in [address] [place] [address]    Reason for Visit: Evaluation of suspected change in hearing, tinnitus, or balance. ADULT AUDIOLOGIC EVALUATION      Josephine Alfaro is a 69 y.o. female seen today, 6/9/2023 , for an initial audiologic evaluation. AUDIOLOGIC AND OTHER PERTINENT MEDICAL HISTORY:      Josephine Alfaro reports left sided hearing loss that started over 2 years ago. Patient denies any otologic symptoms at this time. She was last seen 12/7/20 by Dr. Clari Villafana and Dr. Stephanie Keys for left sensorineural hearing loss. Patient reports family history of hearing loss in her father. She denied otalgia, aural fullness, otorrhea, tinnitus, dizziness, imbalance, history of noise exposure, history of head trauma, and history of ear surgery    Date: 6/9/2023     IMPRESSIONS:      Today's results revealed significant asymmetric sensorineural hearing loss with left worse than right. Poor/Very poor speech understanding when in quiet in the left ear which has decreased significantly since last audiogram on 12/7/20. Tympanometry indicates normal middle ear function. Discussed test results and implications with patient. Discussed possible benefits of amplification. Recommended consult with ENT physician due to noted history of left asymmetric sensorineural hearing loss . Briefly discussed options for amplification including CROS and possible CI evaluation pending medical clearance. Follow medical recommendations of Tutu Burnette MD, Devorah Harmon MD    ASSESSMENT AND FINDINGS:     Otoscopy revealed occluded cerumen bilaterally. Ear cleaning performed by Dr. Farhana Kidd prior to testing. Pink Isauro     RIGHT EAR:  Hearing Sensitivity: Normal sloping to moderate sensorineural hearing loss   Speech Recognition Threshold: 15 dB HL  Word Recognition: Excellent 92%, based on NU-6 25-word list at 55 dBHL using recorded speech

## 2023-06-19 ENCOUNTER — OFFICE VISIT (OUTPATIENT)
Dept: ENT CLINIC | Age: 78
End: 2023-06-19
Payer: MEDICARE

## 2023-06-19 VITALS — WEIGHT: 167 LBS | HEIGHT: 61 IN | BODY MASS INDEX: 31.53 KG/M2

## 2023-06-19 DIAGNOSIS — H90.A22 SENSORINEURAL HEARING LOSS (SNHL) OF LEFT EAR WITH RESTRICTED HEARING OF RIGHT EAR: Primary | ICD-10-CM

## 2023-06-19 PROCEDURE — 99214 OFFICE O/P EST MOD 30 MIN: CPT | Performed by: OTOLARYNGOLOGY

## 2023-06-19 PROCEDURE — 1123F ACP DISCUSS/DSCN MKR DOCD: CPT | Performed by: OTOLARYNGOLOGY

## 2023-06-19 ASSESSMENT — ENCOUNTER SYMPTOMS
SORE THROAT: 0
RHINORRHEA: 0
NAUSEA: 0
EYE PAIN: 0
FACIAL SWELLING: 0
TROUBLE SWALLOWING: 0
EYE ITCHING: 0
COUGH: 0
SHORTNESS OF BREATH: 0
EYE REDNESS: 0
SINUS PAIN: 0
DIARRHEA: 0
VOICE CHANGE: 0
SINUS PRESSURE: 0
CHOKING: 0

## 2023-06-19 NOTE — PROGRESS NOTES
Subjective:      Patient ID: Tiburcio Clemente is a 69 y.o. female. HPI  Hearing Loss HPI  CC: hearing loss    General: Danny Brock is a(n) 69 y.o. female who presents with a long history of left sided hearing loss. Started about 3+ years ago with infection. Had audiogram in 2020 and last week. Personally reviewed an interpreted. See below. Also had MRI brain. No evidence of acoustic neuroma. Personally reviewed and interpreted IAC aspect of MRI.        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[address]    Glucocorticoid deficiency ([address]    Vitamin D deficiency    Osteopenia    Nausea & vomiting    Hiatal hernia    Elevated liver enzymes    Anemia    Memory loss    Folate deficiency    COVID-19 virus infection    Immunosuppression due to chronic steroid use ([address]    Atrial fibrillation ([address]    Non morbid obesity, unspecified obesity type    Moderate episode of recurrent major depressive disorder (HCC)    WASHINGTON (obstructive sleep apnea)     Past Surgical History:   Procedure Laterality Date    CARPAL TUNNEL RELEASE Bilateral     CHOLECYSTECTOMY  05/13/2013    DIAGNOSTIC LAPAROSCOPY,LIGATION OF RIGHT ROUND    HERNIA REPAIR N/A 08/11/2020    ROBOTIC INCISIONAL INCARCERATED HERNIA REPAIR WITH MESH performed by Sigifredo Mcfarland DO at [place]. [address] Final Bilateral     2006 & 2007    PITUITARY EXCISION  01/01/1990 5/6 removed - Dr Rach Lai     Family History   Problem Relation Age of Onset    Diabetes Mother     Pancreatic Cancer Father     No Known Problems Sister     No Known Problems Brother     No Known Problems Maternal Grandmother     No Known Problems Maternal Grandfather     No Known Problems Paternal Grandmother     No Known Problems Paternal Grandfather     No Known Problems Maternal Aunt     No Known Problems Maternal Uncle     No Known Problems

## 2023-06-23 ENCOUNTER — TELEPHONE (OUTPATIENT)
Age: 78
End: 2023-06-23

## 2023-06-23 DIAGNOSIS — I48.91 ATRIAL FIBRILLATION, UNSPECIFIED TYPE (HCC): Primary | ICD-10-CM

## 2023-07-17 NOTE — PROGRESS NOTES
[facility], [address] [place] [address], [address] [place] [address],[address]  P: [phone]       Patient     [place] Grove  1945    ChiefComplaint     Chief Complaint   Patient presents with    New Patient     Patient is here to discuss Inspire. She has used a CPAP in the past.       History of Present Illness     Cayla Gamble is a 73-year-old female here today with her sister for evaluation obstructive sleep apnea. Diagnosed with severe obstructive sleep apnea attempted CPAP use but was unable to tolerate as the mask was constantly falling off her face as well as leaking and could not tolerate for more than 4 hours per night.   Interested in inspire as she currently has untreated atrial fibrillation and memory loss all of which are long-term consequences of untreated sleep apnea    Past Medical History     Past Medical History:   Diagnosis Date    Allergic rhinitis     Anxiety     Depression     GERD (gastroesophageal reflux disease)     Hearing loss     History of blood transfusion     Hypothyroidism     Osteoporosis     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    Restless legs syndrome (RLS)     Sleep apnea     Wears glasses        Past Surgical History     Past Surgical History:   Procedure Laterality Date    CARPAL TUNNEL RELEASE Bilateral     CHOLECYSTECTOMY  05/13/2013    DIAGNOSTIC LAPAROSCOPY,LIGATION OF RIGHT ROUND    HERNIA REPAIR N/A 08/11/2020    ROBOTIC INCISIONAL INCARCERATED HERNIA REPAIR WITH MESH performed by Daniela Andersen DO at [address] [place] Bilateral     2006 & 2007    PITUITARY EXCISION  01/01/1990 5/6 removed - Dr Naveed Soriano       Family History     Family History   Problem Relation Age of Onset    Diabetes Mother     Pancreatic Cancer Father     No Known Problems Sister     No Known Problems Brother     No Known Problems Maternal Grandmother     No Known Problems Maternal Grandfather     No Known Problems Paternal Grandmother     No Known Problems Paternal Split up lisinopril dose to 10 mg twice a day. Let me know about headaches and blood pressure readings in 1 week. If headaches persist, we will add diltiazem back.

## 2023-07-18 ENCOUNTER — OFFICE VISIT (OUTPATIENT)
Dept: ENT CLINIC | Age: 78
End: 2023-07-18
Payer: MEDICARE

## 2023-07-18 VITALS — TEMPERATURE: 97.4 F | HEIGHT: 61 IN | BODY MASS INDEX: 31.15 KG/M2 | WEIGHT: 165 LBS | RESPIRATION RATE: 16 BRPM

## 2023-07-18 DIAGNOSIS — G47.33 OSA (OBSTRUCTIVE SLEEP APNEA): Primary | ICD-10-CM

## 2023-07-18 PROCEDURE — 99204 OFFICE O/P NEW MOD 45 MIN: CPT | Performed by: OTOLARYNGOLOGY

## 2023-07-18 PROCEDURE — 1123F ACP DISCUSS/DSCN MKR DOCD: CPT | Performed by: OTOLARYNGOLOGY

## 2023-07-18 ASSESSMENT — ENCOUNTER SYMPTOMS
SORE THROAT: 0
VOICE CHANGE: 0
FACIAL SWELLING: 0
SINUS PRESSURE: 0
TROUBLE SWALLOWING: 0
EYE ITCHING: 0
SHORTNESS OF BREATH: 0
COUGH: 0
APNEA: 0

## 2023-07-20 ENCOUNTER — TELEPHONE (OUTPATIENT)
Age: 78
End: 2023-07-20

## 2023-07-20 ENCOUNTER — TELEPHONE (OUTPATIENT)
Dept: ENT CLINIC | Age: 78
End: 2023-07-20

## 2023-07-20 DIAGNOSIS — I48.91 ATRIAL FIBRILLATION, UNSPECIFIED TYPE (HCC): Primary | ICD-10-CM

## 2023-07-20 NOTE — TELEPHONE ENCOUNTER
Brigid Sanchez was wondering who is the name of the Cardiologist Dr. Hamilton Gutierrez recommended for patient's Afib. They were wanting to get her scheduled as soon as possible. Cannot find a referral or in notes. Please advise.      Thank you

## 2023-07-20 NOTE — TELEPHONE ENCOUNTER
[address] [facility],Third Floor, [place]  [address] [facility], [address] [place] [address],[address]   [phone]

## 2023-07-20 NOTE — TELEPHONE ENCOUNTER
Spoke to [place] and states pt. Saw a cardiologist with [employer] and askin for the name of that doctor. Found through care everywhere she saw Tia Payne MD [phone] and sent pt a [employer] message with this info also.

## 2023-08-02 DIAGNOSIS — M81.8 OTHER OSTEOPOROSIS WITHOUT CURRENT PATHOLOGICAL FRACTURE: ICD-10-CM

## 2023-08-02 DIAGNOSIS — E23.0 PANHYPOPITUITARISM (HCC): ICD-10-CM

## 2023-08-02 DIAGNOSIS — I48.91 ATRIAL FIBRILLATION, UNSPECIFIED TYPE (HCC): ICD-10-CM

## 2023-08-02 DIAGNOSIS — E03.9 ACQUIRED HYPOTHYROIDISM: Chronic | ICD-10-CM

## 2023-08-03 LAB
ANION GAP SERPL CALCULATED.3IONS-SCNC: 12 MMOL/L (ref 3–16)
BASOPHILS # BLD: 0.1 K/UL (ref 0–0.2)
BASOPHILS NFR BLD: 0.6 %
BUN SERPL-MCNC: 19 MG/DL (ref 7–20)
CALCIUM SERPL-MCNC: 10.5 MG/DL (ref 8.3–10.6)
CHLORIDE SERPL-SCNC: 101 MMOL/L (ref 99–110)
CO2 SERPL-SCNC: 28 MMOL/L (ref 21–32)
CREAT SERPL-MCNC: 1 MG/DL (ref 0.6–1.2)
DEPRECATED RDW RBC AUTO: 16.3 % (ref 12.4–15.4)
EOSINOPHIL # BLD: 0.2 K/UL (ref 0–0.6)
EOSINOPHIL NFR BLD: 1.4 %
GFR SERPLBLD CREATININE-BSD FMLA CKD-EPI: 58 ML/MIN/{1.73_M2}
GLUCOSE SERPL-MCNC: 111 MG/DL (ref 70–99)
HCT VFR BLD AUTO: 39.4 % (ref 36–48)
HGB BLD-MCNC: 12.7 G/DL (ref 12–16)
LYMPHOCYTES # BLD: 1.8 K/UL (ref 1–5.1)
LYMPHOCYTES NFR BLD: 16.4 %
MAGNESIUM SERPL-MCNC: 2 MG/DL (ref 1.8–2.4)
MCH RBC QN AUTO: 27.8 PG (ref 26–34)
MCHC RBC AUTO-ENTMCNC: 32.3 G/DL (ref 31–36)
MCV RBC AUTO: 85.8 FL (ref 80–100)
MONOCYTES # BLD: 0.9 K/UL (ref 0–1.3)
MONOCYTES NFR BLD: 8.5 %
NEUTROPHILS # BLD: 8.1 K/UL (ref 1.7–7.7)
NEUTROPHILS NFR BLD: 73.1 %
PHOSPHATE SERPL-MCNC: 5.1 MG/DL (ref 2.5–4.9)
PLATELET # BLD AUTO: 270 K/UL (ref 135–450)
PMV BLD AUTO: 10.1 FL (ref 5–10.5)
POTASSIUM SERPL-SCNC: 4.4 MMOL/L (ref 3.5–5.1)
RBC # BLD AUTO: 4.59 M/UL (ref 4–5.2)
SODIUM SERPL-SCNC: 141 MMOL/L (ref 136–145)
T4 FREE SERPL-MCNC: 2 NG/DL (ref 0.9–1.8)
TSH SERPL DL<=0.005 MIU/L-ACNC: <0.01 UIU/ML (ref 0.27–4.2)
WBC # BLD AUTO: 11.1 K/UL (ref 4–11)

## 2023-08-08 ENCOUNTER — TELEPHONE (OUTPATIENT)
Age: 78
End: 2023-08-08

## 2023-08-08 NOTE — TELEPHONE ENCOUNTER
Spoke with pt on 8/8/23 @ 1662. Pt understands results and instructions, she will call Dr. Dianne Beach tomorrow for appt.

## 2023-08-08 NOTE — TELEPHONE ENCOUNTER
----- Message from Les Salazar MD sent at 8/8/2023  5:09 PM EDT -----  Gretchen Carmona did not view note on results. Please call her with results.   Thanks

## 2023-08-14 DIAGNOSIS — E23.0 HYPOPITUITARISM (HCC): Primary | ICD-10-CM

## 2023-08-14 DIAGNOSIS — E03.9 ACQUIRED HYPOTHYROIDISM: Chronic | ICD-10-CM

## 2023-08-14 RX ORDER — LEVOTHYROXINE SODIUM 0.07 MG/1
75 TABLET ORAL DAILY
Qty: 90 TABLET | Refills: 0 | Status: SHIPPED | OUTPATIENT
Start: 2023-08-14

## 2023-08-14 NOTE — TELEPHONE ENCOUNTER
Spoke to pt on 8/14/23 @ 4555. Pt understands all information and will follow-up with endocrinologist soon.

## 2023-08-14 NOTE — TELEPHONE ENCOUNTER
I refilled her levothyroxine but I lowered the dose based on her last labs. It is very important for her to follow up with her endocrinologist.   The thyroid labs need to be repeated in 6 weeks. I placed the order.   Thanks

## 2023-08-14 NOTE — TELEPHONE ENCOUNTER
Requested Prescriptions     Pending Prescriptions Disp Refills    levothyroxine (SYNTHROID) 88 MCG tablet [Pharmacy Med Name: LEVOTHYROXINE 0.088MG (88MCG) TAB] 90 tablet 0     Sig: TAKE 1 TABLET BY MOUTH DAILY        Last OV: 5/8/23    Last labs: 8/2/23     F/u: None

## 2023-08-21 NOTE — PROGRESS NOTES
Diane Cords    Age 69 y.o.    female    1945    MRN [id number]    8/28/2023  Arrival Time_____________  OR Time____________30 Antionette Aruna     Procedure(s):  DRUG INDUCED SLEEP ENDOSCOPY                      Monitor Anesthesia Care    Surgeon(s):  Lucy Zhu, DO       Phone [phone] (home) [phone] (work)    [facility]  Cell         Work  _____________________________________________________________________  _____________________________________________________________________  _____________________________________________________________________  _____________________________________________________________________  _____________________________________________________________________    PCP _____________________________ Phone_________________     H&P__________________Bringing      Chart            Epic   DOS      Called________  EKG__________________Bringing      Chart            Epic   DOS      Called________  LAB__________________ Bringing      Chart            Epic   DOS      Called________  Cardiac Clearance_______Bringing      Chart            Epic      DOS      Called________    Cardiologist________________________ Phone___________________________    ? Restoration concerns / Waiver on Chart            PAT Communications________________  ? Pre-op Instructions Given [address]          _________________________________  ? Directions to Surgery Center                          _________________________________  ? Transportation Home_______________      __________________________________  ?  Crutches/Walker__________________        __________________________________    ________Pre-op Orders   _______Transcribed    _______Wt.  ________Pharmacy          _______SCD  ______VTE     ______TED Brando Abide  _______  Surgery Consent    _______  Anesthesia Consent         COVID DATE______________LOCATION________________ RESULT__________

## 2023-08-25 ENCOUNTER — TELEPHONE (OUTPATIENT)
Dept: ENT CLINIC | Age: 78
End: 2023-08-25

## 2023-08-28 ENCOUNTER — HOSPITAL ENCOUNTER (OUTPATIENT)
Age: 78
Setting detail: OUTPATIENT SURGERY
Discharge: HOME | End: 2023-08-28
Attending: OTOLARYNGOLOGY | Admitting: OTOLARYNGOLOGY
Payer: MEDICARE

## 2023-08-28 ENCOUNTER — ANESTHESIA (OUTPATIENT)
Dept: OPERATING ROOM | Age: 78
End: 2023-08-28
Payer: MEDICARE

## 2023-08-28 ENCOUNTER — ANESTHESIA EVENT (OUTPATIENT)
Dept: OPERATING ROOM | Age: 78
End: 2023-08-28
Payer: MEDICARE

## 2023-08-28 VITALS
HEART RATE: 77 BPM | WEIGHT: 181 LBS | SYSTOLIC BLOOD PRESSURE: 134 MMHG | HEIGHT: 61 IN | DIASTOLIC BLOOD PRESSURE: 78 MMHG | BODY MASS INDEX: 34.17 KG/M2 | RESPIRATION RATE: 18 BRPM | TEMPERATURE: 97 F | OXYGEN SATURATION: 100 %

## 2023-08-28 PROCEDURE — 3700000000 HC ANESTHESIA ATTENDED CARE: Performed by: OTOLARYNGOLOGY

## 2023-08-28 PROCEDURE — 6360000002 HC RX W HCPCS: Performed by: NURSE ANESTHETIST, CERTIFIED REGISTERED

## 2023-08-28 PROCEDURE — 2500000003 HC RX 250 WO HCPCS: Performed by: OTOLARYNGOLOGY

## 2023-08-28 PROCEDURE — 7100000010 HC PHASE II RECOVERY - FIRST 15 MIN: Performed by: OTOLARYNGOLOGY

## 2023-08-28 PROCEDURE — 2500000003 HC RX 250 WO HCPCS: Performed by: NURSE ANESTHETIST, CERTIFIED REGISTERED

## 2023-08-28 PROCEDURE — 3600000004 HC SURGERY LEVEL 4 BASE: Performed by: OTOLARYNGOLOGY

## 2023-08-28 PROCEDURE — 7100000011 HC PHASE II RECOVERY - ADDTL 15 MIN: Performed by: OTOLARYNGOLOGY

## 2023-08-28 PROCEDURE — 2580000003 HC RX 258: Performed by: ANESTHESIOLOGY

## 2023-08-28 PROCEDURE — 6370000000 HC RX 637 (ALT 250 FOR IP): Performed by: OTOLARYNGOLOGY

## 2023-08-28 PROCEDURE — 2709999900 HC NON-CHARGEABLE SUPPLY: Performed by: OTOLARYNGOLOGY

## 2023-08-28 PROCEDURE — 42975 DISE EVAL SLP DO BRTH FLX DX: CPT | Performed by: OTOLARYNGOLOGY

## 2023-08-28 RX ORDER — SODIUM CHLORIDE 0.9 % (FLUSH) 0.9 %
5-40 SYRINGE (ML) INJECTION EVERY 12 HOURS SCHEDULED
Status: CANCELLED | OUTPATIENT
Start: 2023-08-28

## 2023-08-28 RX ORDER — OXYCODONE HYDROCHLORIDE 5 MG/1
5 TABLET ORAL PRN
Status: CANCELLED | OUTPATIENT
Start: 2023-08-28

## 2023-08-28 RX ORDER — SODIUM CHLORIDE 0.9 % (FLUSH) 0.9 %
5-40 SYRINGE (ML) INJECTION EVERY 12 HOURS SCHEDULED
Status: DISCONTINUED | OUTPATIENT
Start: 2023-08-28 | End: 2023-08-28 | Stop reason: HOSPADM

## 2023-08-28 RX ORDER — OXYMETAZOLINE HYDROCHLORIDE 0.05 G/100ML
SPRAY NASAL PRN
Status: DISCONTINUED | OUTPATIENT
Start: 2023-08-28 | End: 2023-08-28 | Stop reason: ALTCHOICE

## 2023-08-28 RX ORDER — LIDOCAINE HYDROCHLORIDE 20 MG/ML
INJECTION, SOLUTION INFILTRATION; PERINEURAL PRN
Status: DISCONTINUED | OUTPATIENT
Start: 2023-08-28 | End: 2023-08-28 | Stop reason: SDUPTHER

## 2023-08-28 RX ORDER — SODIUM CHLORIDE 9 MG/ML
INJECTION, SOLUTION INTRAVENOUS PRN
Status: CANCELLED | OUTPATIENT
Start: 2023-08-28

## 2023-08-28 RX ORDER — DIPHENHYDRAMINE HYDROCHLORIDE 50 MG/ML
6.25 INJECTION INTRAMUSCULAR; INTRAVENOUS
Status: CANCELLED | OUTPATIENT
Start: 2023-08-28 | End: 2023-08-29

## 2023-08-28 RX ORDER — PROPOFOL 10 MG/ML
INJECTION, EMULSION INTRAVENOUS CONTINUOUS PRN
Status: DISCONTINUED | OUTPATIENT
Start: 2023-08-28 | End: 2023-08-28 | Stop reason: SDUPTHER

## 2023-08-28 RX ORDER — MIDAZOLAM HYDROCHLORIDE 1 MG/ML
2 INJECTION INTRAMUSCULAR; INTRAVENOUS
Status: CANCELLED | OUTPATIENT
Start: 2023-08-28 | End: 2023-08-29

## 2023-08-28 RX ORDER — ONDANSETRON 2 MG/ML
4 INJECTION INTRAMUSCULAR; INTRAVENOUS EVERY 30 MIN PRN
Status: CANCELLED | OUTPATIENT
Start: 2023-08-28

## 2023-08-28 RX ORDER — LIDOCAINE HYDROCHLORIDE 40 MG/ML
INJECTION, SOLUTION RETROBULBAR; TOPICAL PRN
Status: DISCONTINUED | OUTPATIENT
Start: 2023-08-28 | End: 2023-08-28 | Stop reason: ALTCHOICE

## 2023-08-28 RX ORDER — SODIUM CHLORIDE 0.9 % (FLUSH) 0.9 %
5-40 SYRINGE (ML) INJECTION PRN
Status: DISCONTINUED | OUTPATIENT
Start: 2023-08-28 | End: 2023-08-28 | Stop reason: HOSPADM

## 2023-08-28 RX ORDER — SODIUM CHLORIDE 0.9 % (FLUSH) 0.9 %
5-40 SYRINGE (ML) INJECTION PRN
Status: CANCELLED | OUTPATIENT
Start: 2023-08-28

## 2023-08-28 RX ORDER — SODIUM CHLORIDE, SODIUM LACTATE, POTASSIUM CHLORIDE, CALCIUM CHLORIDE 600; 310; 30; 20 MG/100ML; MG/100ML; MG/100ML; MG/100ML
INJECTION, SOLUTION INTRAVENOUS CONTINUOUS
Status: DISCONTINUED | OUTPATIENT
Start: 2023-08-28 | End: 2023-08-28 | Stop reason: HOSPADM

## 2023-08-28 RX ORDER — MEPERIDINE HYDROCHLORIDE 50 MG/ML
12.5 INJECTION INTRAMUSCULAR; INTRAVENOUS; SUBCUTANEOUS EVERY 5 MIN PRN
Status: CANCELLED | OUTPATIENT
Start: 2023-08-28

## 2023-08-28 RX ORDER — LIDOCAINE HYDROCHLORIDE 10 MG/ML
0.3 INJECTION, SOLUTION EPIDURAL; INFILTRATION; INTRACAUDAL; PERINEURAL
Status: DISCONTINUED | OUTPATIENT
Start: 2023-08-28 | End: 2023-08-28 | Stop reason: HOSPADM

## 2023-08-28 RX ORDER — SODIUM CHLORIDE 9 MG/ML
INJECTION, SOLUTION INTRAVENOUS PRN
Status: DISCONTINUED | OUTPATIENT
Start: 2023-08-28 | End: 2023-08-28 | Stop reason: HOSPADM

## 2023-08-28 RX ORDER — OXYCODONE HYDROCHLORIDE 5 MG/1
10 TABLET ORAL PRN
Status: CANCELLED | OUTPATIENT
Start: 2023-08-28

## 2023-08-28 RX ADMIN — PROPOFOL 100 MCG/KG/MIN: 10 INJECTION, EMULSION INTRAVENOUS at 08:57

## 2023-08-28 RX ADMIN — PROPOFOL 10 MG: 10 INJECTION, EMULSION INTRAVENOUS at 09:01

## 2023-08-28 RX ADMIN — LIDOCAINE HYDROCHLORIDE 60 MG: 20 INJECTION, SOLUTION INFILTRATION; PERINEURAL at 08:57

## 2023-08-28 RX ADMIN — SODIUM CHLORIDE, POTASSIUM CHLORIDE, SODIUM LACTATE AND CALCIUM CHLORIDE: 600; 310; 30; 20 INJECTION, SOLUTION INTRAVENOUS at 08:00

## 2023-08-28 ASSESSMENT — PAIN - FUNCTIONAL ASSESSMENT: PAIN_FUNCTIONAL_ASSESSMENT: 0-10

## 2023-08-28 NOTE — OP NOTE
[facility]  OPERATIVE REPORT    Patient Name: Scott Grove  YOB: 1945  Medical Record Number:  [id number]  Billing Number:  [id number]  Date of Procedure: 08/28/23  Time: 3479    Pre Operative Diagnoses: Obstructive Sleep Apnea  Post Operative Diagnoses:  Obstructive Sleep Apnea           Procedure:  1. Drug Induced Sleep endoscopy (22525)       Surgeon: Barrie Medrano,     OR Staff/ Assistant:  Circulator: Cira Rajput RN  Scrub Person First: Valerio Dahl  Circulator Assist: Onetha Bio    Anesthesia:  Sedation     Findings:  1) circumferential collapse    Indications: This is a 69 y.o. female with history of moderate to severe symptomatic obstructive sleep apnea, who is intolerant unable to achieve benefit with positive pressure therapy, presents today for drug-induced sleep endoscopy to better characterize her locations and pattern of obstruction to predict appropriate medical and/or surgical options moving forward. Risks and benefits discussed with the patient including alternate treatment options, Informed consent was obtained, the patient elected to proceed with the planned procedure. DETAILS OF PROCEDURE(S):       The patient was brought to the operating room and was anesthetized via the standard drug-induced sleep endoscopy protocol. The propofol infusion rate was started at 75 MCG and increase gradually to level at which conditions that mimic sleep were gradually observed. With the patient unresponsive to verbal commands, but still with spontaneous respiration, sleep disordered breathing events and associated desaturations were clearly observed. Under these conditions, flexible endoscope was inserted to examine both sides of the nose as well as the pharynx. The nose was relatively unremarkable. The retropatellar space showed a more obliquely oriented palate.   A complete lateral wall component was noted, with palatal

## 2023-08-28 NOTE — H&P
mobility. Left Ear: Tympanic membrane, ear canal and external ear normal. No drainage. No middle ear effusion. Tympanic membrane is not bulging. Tympanic membrane has normal mobility. Nose: No mucosal edema or rhinorrhea. Mouth/Throat:      Lips: Pink. Mouth: Mucous membranes are moist.      Tongue: No lesions. Palate: No mass. Pharynx: Uvula midline. Tonsils: 1+ on the right. 1+ on the left. Eyes:      Pupils: Pupils are equal, round, and reactive to light. Neck:      Thyroid: No thyroid mass or thyromegaly. Trachea: Trachea and phonation normal.   Cardiovascular:      Pulses: Normal pulses. Pulmonary:      Effort: Pulmonary effort is normal. No accessory muscle usage or respiratory distress. Breath sounds: No stridor. Musculoskeletal:      Cervical back: Full passive range of motion without pain. Lymphadenopathy:      Head:      Right side of head: No submental or submandibular adenopathy. Left side of head: No submental or submandibular adenopathy. Cervical: No cervical adenopathy. Right cervical: No superficial, deep or posterior cervical adenopathy. Left cervical: No superficial, deep or posterior cervical adenopathy. Skin:     General: Skin is warm and dry. Neurological:      Mental Status: She is alert and oriented to person, place, and time. Cranial Nerves: No cranial nerve deficit. Coordination: Coordination normal.      Gait: Gait normal.   Psychiatric:         Thought Content: Thought content normal.               Assessment and Plan      1. WASHINGTON (obstructive sleep apnea)     Inspire process and requirements discussed in detail with patient. female has an AHI of 73 on sleep study performed 1/2023. He/She has tried and failed CPAP secondary to frequent mask leaks and removing of mask at night while sleeping, unable to tolerate for more than 4 hours at a time. Her BMI is 31.55.  She understands that a sleep endoscopy

## 2023-08-28 NOTE — PROGRESS NOTES
Incontinent of urine on arrival to pacu and cleaned at bed side. Now walked to BR to void qs uop and wallked patient to car without event.

## 2023-08-28 NOTE — ANESTHESIA PRE PROCEDURE
Department of Anesthesiology  Preprocedure Note       Name:  Sundar Daniels   Age:  69 y.o.  :  1945                                          MRN:  [id number]         Date:  2023      Surgeon: Dmitriy Lucio):  Marcia Hill DO    Procedure: Procedure(s):  DRUG INDUCED SLEEP ENDOSCOPY    Medications prior to admission:   Prior to Admission medications    Medication Sig Start Date End Date Taking? Authorizing Provider   levothyroxine (SYNTHROID) 75 MCG tablet Take 1 tablet by mouth Daily 23   Hananestine MD Bibi   rivaroxaban (XARELTO) 20 MG TABS tablet Take 1 tablet by mouth daily (with breakfast) 23   Hananestine MD Bibi   hydrocortisone sodium succinate PF (SOLU-CORTEF) 100 MG SOLR injection Inject 100 mg IM once prn shock, inability to take prednisone orally 23   Cecedystine MD Bibi   SYRINGE-NEEDLE, DISP, 3 ML 22G X 1-1/2\" 3 ML MISC 1 each by Does not apply route daily 23   Hananestantonio Mtz MD   zoster recombinant adjuvanted vaccine [facility]) 50 MCG/0.5ML SUSR injection Inject 0.5 mLs into the muscle See Admin Instructions 23  Hananestantonio Mtz MD   pantoprazole (PROTONIX) 40 MG tablet Take 1 tablet by mouth daily 23   Cecedystine MD Bibi   predniSONE (DELTASONE) 5 MG tablet Take 1 tablet by mouth daily Increase per endocrinologist's instructions PRN acute illness or stress. Patient taking differently: Take 0.5 tablets by mouth 2 times daily Increase per endocrinologist's instructions PRN acute illness or stress.  23   Gladystine MD Bibi   atorvastatin (LIPITOR) 20 MG tablet Take 1 tablet by mouth daily 23   Gladystine MD Bibi   Vitamin B Complex-C CAPS Take 1 capsule by mouth daily 22   Hananestantonio Mtz MD   Cholecalciferol (VITAMIN D3) 50 MCG (2000) CAPS Take 1,000 Units by mouth daily    Historical Provider, MD   CALCIUM PO Take by mouth daily    Historical Provider, MD       Current medications:    Current Facility-Administered

## 2023-09-20 ENCOUNTER — TELEPHONE (OUTPATIENT)
Age: 78
End: 2023-09-20

## 2023-09-20 ENCOUNTER — OFFICE VISIT (OUTPATIENT)
Age: 78
End: 2023-09-20

## 2023-09-20 VITALS
RESPIRATION RATE: 16 BRPM | TEMPERATURE: 98.2 F | OXYGEN SATURATION: 98 % | HEART RATE: 78 BPM | SYSTOLIC BLOOD PRESSURE: 126 MMHG | WEIGHT: 170.38 LBS | BODY MASS INDEX: 32.19 KG/M2 | DIASTOLIC BLOOD PRESSURE: 84 MMHG

## 2023-09-20 DIAGNOSIS — I48.0 PAROXYSMAL ATRIAL FIBRILLATION (HCC): ICD-10-CM

## 2023-09-20 DIAGNOSIS — R41.3 MEMORY LOSS: Primary | ICD-10-CM

## 2023-09-20 DIAGNOSIS — G47.33 OSA (OBSTRUCTIVE SLEEP APNEA): ICD-10-CM

## 2023-09-20 RX ORDER — DOXEPIN HYDROCHLORIDE 10 MG/ML
3-6 SOLUTION ORAL NIGHTLY
Qty: 20 ML | Refills: 2 | Status: SHIPPED | OUTPATIENT
Start: 2023-09-20

## 2023-09-20 NOTE — TELEPHONE ENCOUNTER
Dr. Leela Jones at [facility] saw for evaluation for Watchman procedure in March. She is interested in having procedure. She was waiting to hear back from Dr. Monae Banuelos about the next step and has not heard. Please call his office and ask what her next step is.   Thanks

## 2023-10-19 DIAGNOSIS — E78.00 PURE HYPERCHOLESTEROLEMIA: ICD-10-CM

## 2023-10-19 RX ORDER — ATORVASTATIN CALCIUM 20 MG/1
20 TABLET, FILM COATED ORAL DAILY
Qty: 90 TABLET | Refills: 1 | Status: SHIPPED | OUTPATIENT
Start: 2023-10-19

## 2023-10-19 NOTE — TELEPHONE ENCOUNTER
LAST VISIT 09/20/2023 SM, NEXT VISIT 11/13/2023 SM. Component      Latest Ref Rng 5/8/2023   Cholesterol, Total      0 - 199 mg/dL 159    Triglycerides      0 - 150 mg/dL 99    HDL Cholesterol      40 - 60 mg/dL 57    LDL Calculated      <100 mg/dL 82    VLDL Cholesterol Calculated      Not Established mg/dL 20      PENDED MED. [facility]

## 2023-11-13 ENCOUNTER — OFFICE VISIT (OUTPATIENT)
Age: 78
End: 2023-11-13
Payer: MEDICARE

## 2023-11-13 VITALS
SYSTOLIC BLOOD PRESSURE: 116 MMHG | HEIGHT: 61 IN | HEART RATE: 83 BPM | RESPIRATION RATE: 16 BRPM | BODY MASS INDEX: 32.02 KG/M2 | TEMPERATURE: 97.3 F | WEIGHT: 169.6 LBS | DIASTOLIC BLOOD PRESSURE: 70 MMHG | OXYGEN SATURATION: 98 %

## 2023-11-13 DIAGNOSIS — Z23 NEED FOR SHINGLES VACCINE: ICD-10-CM

## 2023-11-13 DIAGNOSIS — G47.33 OSA (OBSTRUCTIVE SLEEP APNEA): ICD-10-CM

## 2023-11-13 DIAGNOSIS — Z00.00 MEDICARE ANNUAL WELLNESS VISIT, SUBSEQUENT: Primary | ICD-10-CM

## 2023-11-13 DIAGNOSIS — E03.9 ACQUIRED HYPOTHYROIDISM: Chronic | ICD-10-CM

## 2023-11-13 DIAGNOSIS — Z01.00 ROUTINE EYE EXAM: ICD-10-CM

## 2023-11-13 DIAGNOSIS — E23.0 HYPOPITUITARISM (HCC): ICD-10-CM

## 2023-11-13 PROCEDURE — G0439 PPPS, SUBSEQ VISIT: HCPCS | Performed by: FAMILY MEDICINE

## 2023-11-13 PROCEDURE — 1123F ACP DISCUSS/DSCN MKR DOCD: CPT | Performed by: FAMILY MEDICINE

## 2023-11-13 RX ORDER — ZOSTER VACCINE RECOMBINANT, ADJUVANTED 50 MCG/0.5
0.5 KIT INTRAMUSCULAR SEE ADMIN INSTRUCTIONS
Qty: 0.5 ML | Refills: 0 | Status: SHIPPED | OUTPATIENT
Start: 2023-11-13

## 2023-11-13 RX ORDER — LEVOTHYROXINE SODIUM 0.07 MG/1
75 TABLET ORAL DAILY
Qty: 90 TABLET | Refills: 0 | Status: SHIPPED | OUTPATIENT
Start: 2023-11-13

## 2023-11-13 RX ORDER — DONEPEZIL HYDROCHLORIDE 10 MG/1
10 TABLET, FILM COATED ORAL NIGHTLY
COMMUNITY
Start: 2023-10-26

## 2023-11-13 RX ORDER — TRAZODONE HYDROCHLORIDE 50 MG/1
TABLET ORAL
Qty: 60 TABLET | Refills: 5 | Status: SHIPPED | OUTPATIENT
Start: 2023-11-13

## 2023-11-13 ASSESSMENT — PATIENT HEALTH QUESTIONNAIRE - PHQ9
4. FEELING TIRED OR HAVING LITTLE ENERGY: 1
SUM OF ALL RESPONSES TO PHQ QUESTIONS 1-9: 5
8. MOVING OR SPEAKING SO SLOWLY THAT OTHER PEOPLE COULD HAVE NOTICED. OR THE OPPOSITE, BEING SO FIGETY OR RESTLESS THAT YOU HAVE BEEN MOVING AROUND A LOT MORE THAN USUAL: 1
1. LITTLE INTEREST OR PLEASURE IN DOING THINGS: 1
SUM OF ALL RESPONSES TO PHQ QUESTIONS 1-9: 5
6. FEELING BAD ABOUT YOURSELF - OR THAT YOU ARE A FAILURE OR HAVE LET YOURSELF OR YOUR FAMILY DOWN: 0
7. TROUBLE CONCENTRATING ON THINGS, SUCH AS READING THE NEWSPAPER OR WATCHING TELEVISION: 1
10. IF YOU CHECKED OFF ANY PROBLEMS, HOW DIFFICULT HAVE THESE PROBLEMS MADE IT FOR YOU TO DO YOUR WORK, TAKE CARE OF THINGS AT HOME, OR GET ALONG WITH OTHER PEOPLE: 0
3. TROUBLE FALLING OR STAYING ASLEEP: 1
5. POOR APPETITE OR OVEREATING: 0
SUM OF ALL RESPONSES TO PHQ9 QUESTIONS 1 & 2: 1
9. THOUGHTS THAT YOU WOULD BE BETTER OFF DEAD, OR OF HURTING YOURSELF: 0
2. FEELING DOWN, DEPRESSED OR HOPELESS: 0

## 2023-11-13 ASSESSMENT — COLUMBIA-SUICIDE SEVERITY RATING SCALE - C-SSRS
4. HAVE YOU HAD THESE THOUGHTS AND HAD SOME INTENTION OF ACTING ON THEM?: NO
7. DID THIS OCCUR IN THE LAST THREE MONTHS: NO
2. HAVE YOU ACTUALLY HAD ANY THOUGHTS OF KILLING YOURSELF?: NO
5. HAVE YOU STARTED TO WORK OUT OR WORKED OUT THE DETAILS OF HOW TO KILL YOURSELF? DO YOU INTEND TO CARRY OUT THIS PLAN?: NO
3. HAVE YOU BEEN THINKING ABOUT HOW YOU MIGHT KILL YOURSELF?: NO
6. HAVE YOU EVER DONE ANYTHING, STARTED TO DO ANYTHING, OR PREPARED TO DO ANYTHING TO END YOUR LIFE?: NO
BASED ON RESPONSES TO C-SSRS QS 1-6, WHAT IS THE PATIENT'S OVERALL RISK RATING FOR SUICIDE: NO RISK
1. WITHIN THE PAST MONTH, HAVE YOU WISHED YOU WERE DEAD OR WISHED YOU COULD GO TO SLEEP AND NOT WAKE UP?: NO

## 2023-11-13 ASSESSMENT — LIFESTYLE VARIABLES
HOW OFTEN DO YOU HAVE A DRINK CONTAINING ALCOHOL: NEVER
HOW MANY STANDARD DRINKS CONTAINING ALCOHOL DO YOU HAVE ON A TYPICAL DAY: PATIENT DOES NOT DRINK

## 2023-11-13 NOTE — PROGRESS NOTES
Medicare Annual Wellness Visit    Naun Serrano is here for Medicare AWV    Assessment & Plan   Medicare annual wellness visit, subsequent  IN BODY reviewed and handout provided for home resistance training. Advised to increase walks to 30 min 5 days a week. Diet:  strategies to add more fruits and vegetables addressed   Acquired hypothyroidism  -     levothyroxine (SYNTHROID) 75 MCG tablet; Take 1 tablet by mouth Daily, Disp-90 tablet, R-0Normal  Hypopituitarism (HCC)  -     levothyroxine (SYNTHROID) 75 MCG tablet; Take 1 tablet by mouth Daily, Disp-90 tablet, R-0Normal  Due follow up with Dr. Jesus Sánchez; she agrees to schedule  WASHINGTON (obstructive sleep apnea)  -     traZODone (DESYREL) 50 MG tablet; Take 25 mg po 30-45 min before bed; increase by 25 mg to max of 100 mg po QHS for insomnia, Disp-60 tablet, R-5Normal  Short term use addressed; may need Ambien or benzodiazepine for a week or two if Trazdone is not effective in helping her to get used to CPAP. Routine eye exam  -     AFL - Brynn Rodriguez, OD, Optometry, [place]  Need for shingles vaccine  -     zoster recombinant adjuvanted vaccine [facility]) 50 MCG/0.5ML SUSR injection; Inject 0.5 mLs into the muscle See Admin Instructions for 1 dose, Disp-0.5 mL, R-0Normal    Follow up in 3 months or prn. Recommendations for Preventive Services Due: see orders and patient instructions/AVS.  Recommended screening schedule for the next 5-10 years is provided to the patient in written form: see Patient Instructions/AVS.     No follow-ups on file. Subjective     Has not been using cPAP. Is willing to retry it with medication to help her to slee    Patient's complete Health Risk Assessment and screening values have been reviewed and are found in Flowsheets. The following problems were reviewed today and where indicated follow up appointments were made and/or referrals ordered.     Positive Risk Factor Screenings with Interventions:

## 2023-11-29 ENCOUNTER — OFFICE VISIT (OUTPATIENT)
Age: 78
End: 2023-11-29

## 2023-11-29 VITALS
HEART RATE: 71 BPM | OXYGEN SATURATION: 98 % | RESPIRATION RATE: 16 BRPM | DIASTOLIC BLOOD PRESSURE: 66 MMHG | BODY MASS INDEX: 32.2 KG/M2 | TEMPERATURE: 97.2 F | SYSTOLIC BLOOD PRESSURE: 114 MMHG | WEIGHT: 170.4 LBS

## 2023-11-29 DIAGNOSIS — R11.0 NAUSEA: ICD-10-CM

## 2023-11-29 DIAGNOSIS — G47.33 OBSTRUCTIVE SLEEP APNEA SYNDROME: ICD-10-CM

## 2023-11-29 DIAGNOSIS — E03.9 ACQUIRED HYPOTHYROIDISM: Chronic | ICD-10-CM

## 2023-11-29 DIAGNOSIS — R11.0 NAUSEA: Primary | ICD-10-CM

## 2023-11-29 DIAGNOSIS — E23.0 HYPOPITUITARISM (HCC): ICD-10-CM

## 2023-11-29 RX ORDER — ONDANSETRON 4 MG/1
4 TABLET, FILM COATED ORAL 3 TIMES DAILY PRN
Qty: 15 TABLET | Refills: 0 | Status: SHIPPED | OUTPATIENT
Start: 2023-11-29

## 2023-11-29 NOTE — PROGRESS NOTES
Subjective:      Patient ID: Gely Pryor 69 y.o. female. is here for evaluation for nausea      HPI    One week ago developed decreased appetite, nausea, queasy stomach. Fatigue; sleeping all day. Knees ache. Had to cancer dental extraction because of sxs. No dental pain currently. Having a BM daily, no melena, hematochezia. Emesis once in the middle of the night once a few days after onset of sxs. No hemoptysis or coffee ground emesis. Denies abdominal pain. Today the nausea has resolved; she was able to eat toast for breakfast without problem. Is compliant with medications including Protonix. She is not taking any acute medications. Is not taking any NSAIDS. May have started when she increased dose of Aricept. Knees feel fine today. Has not been using CPAP. She saw Dr. Yojana Campos with [facility] for sleep apnea. She ;turned the CPAP down to 4 and is going to try different masks. EGD 2018  + hiatal hernia  Colonoscopy 2018 + pan-colonic diverticulosis    Lab Results   Component Value Date     08/18/2023    K 4.0 08/18/2023     08/18/2023    CO2 30 08/18/2023    BUN 15 08/18/2023    CREATININE 0.8 08/18/2023    GLUCOSE 87 08/18/2023    CALCIUM 10.2 08/18/2023    PROT 7.5 05/08/2023    LABALBU 4.3 05/08/2023    BILITOT 0.4 05/08/2023    ALKPHOS 78 05/08/2023    AST 18 05/08/2023    ALT 11 05/08/2023    LABGLOM >60 08/18/2023    GFRAA >60 10/11/2021    AGRATIO 1.3 05/08/2023    GLOB 3.7 10/11/2021        Lab Results   Component Value Date    WBC 11.1 (H) 08/02/2023    HGB 12.7 08/02/2023    HCT 39.4 08/02/2023    MCV 85.8 08/02/2023     08/02/2023     Details    Reading Physician Reading Date Result Priority   Jude Sma MD  [phone] 10/19/2021      Narrative & Impression  EXAM: CT ABDOMEN AND PELVIS WITH CONTRAST     INDICATION: Abdominal pain, right lower quadrant, Pain     COMPARISON: None.      TECHNIQUE: Axial CT imaging obtained from lung

## 2023-11-30 LAB
ALBUMIN SERPL-MCNC: 4.3 G/DL (ref 3.4–5)
ALBUMIN/GLOB SERPL: 1.4 {RATIO} (ref 1.1–2.2)
ALP SERPL-CCNC: 82 U/L (ref 40–129)
ALT SERPL-CCNC: 11 U/L (ref 10–40)
ANION GAP SERPL CALCULATED.3IONS-SCNC: 12 MMOL/L (ref 3–16)
AST SERPL-CCNC: 16 U/L (ref 15–37)
BASOPHILS # BLD: 0.1 K/UL (ref 0–0.2)
BASOPHILS NFR BLD: 0.7 %
BILIRUB SERPL-MCNC: 0.3 MG/DL (ref 0–1)
BUN SERPL-MCNC: 22 MG/DL (ref 7–20)
CALCIUM SERPL-MCNC: 10.7 MG/DL (ref 8.3–10.6)
CHLORIDE SERPL-SCNC: 101 MMOL/L (ref 99–110)
CO2 SERPL-SCNC: 28 MMOL/L (ref 21–32)
CREAT SERPL-MCNC: 0.9 MG/DL (ref 0.6–1.2)
DEPRECATED RDW RBC AUTO: 17.7 % (ref 12.4–15.4)
EOSINOPHIL # BLD: 0.2 K/UL (ref 0–0.6)
EOSINOPHIL NFR BLD: 1.3 %
GFR SERPLBLD CREATININE-BSD FMLA CKD-EPI: >60 ML/MIN/{1.73_M2}
GLUCOSE SERPL-MCNC: 107 MG/DL (ref 70–99)
HCT VFR BLD AUTO: 40.2 % (ref 36–48)
HGB BLD-MCNC: 12.8 G/DL (ref 12–16)
LIPASE SERPL-CCNC: 23 U/L (ref 13–60)
LYMPHOCYTES # BLD: 1.7 K/UL (ref 1–5.1)
LYMPHOCYTES NFR BLD: 13.6 %
MCH RBC QN AUTO: 27 PG (ref 26–34)
MCHC RBC AUTO-ENTMCNC: 31.7 G/DL (ref 31–36)
MCV RBC AUTO: 84.9 FL (ref 80–100)
MONOCYTES # BLD: 1.2 K/UL (ref 0–1.3)
MONOCYTES NFR BLD: 9.6 %
NEUTROPHILS # BLD: 9.6 K/UL (ref 1.7–7.7)
NEUTROPHILS NFR BLD: 74.8 %
PLATELET # BLD AUTO: 353 K/UL (ref 135–450)
PMV BLD AUTO: 9.8 FL (ref 5–10.5)
POTASSIUM SERPL-SCNC: 4.8 MMOL/L (ref 3.5–5.1)
PROT SERPL-MCNC: 7.3 G/DL (ref 6.4–8.2)
RBC # BLD AUTO: 4.73 M/UL (ref 4–5.2)
SODIUM SERPL-SCNC: 141 MMOL/L (ref 136–145)
T4 FREE SERPL-MCNC: 1.9 NG/DL (ref 0.9–1.8)
TSH SERPL DL<=0.005 MIU/L-ACNC: <0.01 UIU/ML (ref 0.27–4.2)
WBC # BLD AUTO: 12.8 K/UL (ref 4–11)

## 2024-01-02 ENCOUNTER — TELEPHONE (OUTPATIENT)
Age: 79
End: 2024-01-02

## 2024-01-02 DIAGNOSIS — I48.91 ATRIAL FIBRILLATION, UNSPECIFIED TYPE (HCC): ICD-10-CM

## 2024-01-02 RX ORDER — BENZONATATE 200 MG/1
200 CAPSULE ORAL 3 TIMES DAILY PRN
Qty: 21 CAPSULE | Refills: 0 | Status: SHIPPED | OUTPATIENT
Start: 2024-01-02 | End: 2024-01-09

## 2024-01-02 NOTE — TELEPHONE ENCOUNTER
Push fluids -water .  Tessalon perles were sent to [facility]. Please confirm this is the preferred pharmacy as no pharmacy was specified.  Advised follow up if fever or shortness of breath / wheezing occur or not improving over the next 7 days or worsening.

## 2024-01-02 NOTE — TELEPHONE ENCOUNTER
Pt sister called to request some Tessalon Pearls for pt. Radha says that Lexi has had a cough since before Christmas, started as a \"little cold\" that went away quickly and no other symptoms whatsoever just the cough. No SOB. Asked if she would like to bring her in and be seen but she would like to try this way first if possible because she has not been well lately, either. Radha says pt has taken them before but could not be found in History.

## 2024-02-14 ENCOUNTER — OFFICE VISIT (OUTPATIENT)
Age: 79
End: 2024-02-14

## 2024-02-14 VITALS
BODY MASS INDEX: 31.48 KG/M2 | TEMPERATURE: 97 F | OXYGEN SATURATION: 97 % | WEIGHT: 166.6 LBS | RESPIRATION RATE: 16 BRPM | SYSTOLIC BLOOD PRESSURE: 114 MMHG | HEART RATE: 88 BPM | DIASTOLIC BLOOD PRESSURE: 76 MMHG

## 2024-02-14 DIAGNOSIS — E78.00 PURE HYPERCHOLESTEROLEMIA: ICD-10-CM

## 2024-02-14 DIAGNOSIS — E23.0 HYPOPITUITARISM (HCC): ICD-10-CM

## 2024-02-14 DIAGNOSIS — E03.9 ACQUIRED HYPOTHYROIDISM: Chronic | ICD-10-CM

## 2024-02-14 DIAGNOSIS — K21.9 GASTROESOPHAGEAL REFLUX DISEASE, UNSPECIFIED WHETHER ESOPHAGITIS PRESENT: ICD-10-CM

## 2024-02-14 DIAGNOSIS — R11.0 NAUSEA: ICD-10-CM

## 2024-02-14 DIAGNOSIS — Z23 NEED FOR SHINGLES VACCINE: ICD-10-CM

## 2024-02-14 DIAGNOSIS — G30.9 ALZHEIMER DISEASE (HCC): Primary | ICD-10-CM

## 2024-02-14 DIAGNOSIS — G47.33 OSA (OBSTRUCTIVE SLEEP APNEA): ICD-10-CM

## 2024-02-14 DIAGNOSIS — F02.80 ALZHEIMER DISEASE (HCC): Primary | ICD-10-CM

## 2024-02-14 DIAGNOSIS — I48.91 ATRIAL FIBRILLATION, UNSPECIFIED TYPE (HCC): ICD-10-CM

## 2024-02-14 RX ORDER — LEVOTHYROXINE SODIUM 0.07 MG/1
75 TABLET ORAL DAILY
Qty: 90 TABLET | Refills: 3 | Status: SHIPPED | OUTPATIENT
Start: 2024-02-14

## 2024-02-14 RX ORDER — ONDANSETRON 4 MG/1
4 TABLET, FILM COATED ORAL 3 TIMES DAILY PRN
Qty: 15 TABLET | Refills: 0 | Status: SHIPPED | OUTPATIENT
Start: 2024-02-14

## 2024-02-14 RX ORDER — ATORVASTATIN CALCIUM 20 MG/1
20 TABLET, FILM COATED ORAL DAILY
Qty: 90 TABLET | Refills: 3 | Status: SHIPPED | OUTPATIENT
Start: 2024-02-14

## 2024-02-14 RX ORDER — PANTOPRAZOLE SODIUM 40 MG/1
40 TABLET, DELAYED RELEASE ORAL DAILY
Qty: 90 TABLET | Refills: 3 | Status: SHIPPED | OUTPATIENT
Start: 2024-02-14

## 2024-02-14 RX ORDER — RIVASTIGMINE 4.6 MG/24H
1 PATCH, EXTENDED RELEASE TRANSDERMAL DAILY
Qty: 30 PATCH | Refills: 2 | Status: SHIPPED | OUTPATIENT
Start: 2024-02-14

## 2024-02-14 RX ORDER — ZOSTER VACCINE RECOMBINANT, ADJUVANTED 50 MCG/0.5
0.5 KIT INTRAMUSCULAR SEE ADMIN INSTRUCTIONS
Qty: 0.5 ML | Refills: 0 | Status: SHIPPED | OUTPATIENT
Start: 2024-02-14 | End: 2024-02-15

## 2024-02-14 NOTE — PROGRESS NOTES
Aricept      2. Nausea  ondansetron (ZOFRAN) 4 MG tablet      3. Acquired hypothyroidism  levothyroxine (SYNTHROID) 75 MCG tablet  Has appt with Dr. Roper  Labs up-to-date      4. Hypopituitarism (HCC)  levothyroxine (SYNTHROID) 75 MCG tablet      5. WASHINGTON (obstructive sleep apnea)  Importance of tx for afib and memory addressed.  Encouraged trial oral device; can stop using it if she does not like it      6. Atrial fibrillation, paroxysmal  Continue Xarelto.  Continue cardiology follow up with Dr. Jarquin       7. Pure hypercholesterolemia  atorvastatin (LIPITOR) 20 MG tablet      8. Gastroesophageal reflux disease, unspecified whether esophagitis present  pantoprazole (PROTONIX) 40 MG tablet      9. Need for shingles vaccine  zoster recombinant adjuvanted vaccine (SHINGRIX) 50 MCG/0.5ML SUSR injection       10.  Dexa scan - will consider.       Plan:      Side effects of current medications reviewed and questions answered.  Follow up in 3 months or prn.

## 2024-03-15 ENCOUNTER — TELEPHONE (OUTPATIENT)
Age: 79
End: 2024-03-15

## 2024-03-15 DIAGNOSIS — F02.80 ALZHEIMER DISEASE (HCC): ICD-10-CM

## 2024-03-15 DIAGNOSIS — K21.9 GASTROESOPHAGEAL REFLUX DISEASE, UNSPECIFIED WHETHER ESOPHAGITIS PRESENT: ICD-10-CM

## 2024-03-15 DIAGNOSIS — G30.9 ALZHEIMER DISEASE (HCC): ICD-10-CM

## 2024-03-15 RX ORDER — RIVASTIGMINE 9.5 MG/24H
1 PATCH, EXTENDED RELEASE TRANSDERMAL DAILY
Qty: 90 PATCH | Refills: 1 | Status: SHIPPED | OUTPATIENT
Start: 2024-03-15

## 2024-03-15 RX ORDER — PANTOPRAZOLE SODIUM 40 MG/1
40 TABLET, DELAYED RELEASE ORAL DAILY
Qty: 90 TABLET | Refills: 3 | Status: SHIPPED | OUTPATIENT
Start: 2024-03-15

## 2024-03-15 NOTE — TELEPHONE ENCOUNTER
Requested Prescriptions     Pending Prescriptions Disp Refills    pantoprazole (PROTONIX) 40 MG tablet 90 tablet 3     Sig: Take 1 tablet by mouth daily         Last OV: 2/14/2024    Last labs: 11/29/2023     F/u: 5/22/2024

## 2024-03-19 ENCOUNTER — OFFICE VISIT (OUTPATIENT)
Dept: ENT CLINIC | Age: 79
End: 2024-03-19
Payer: MEDICARE

## 2024-03-19 ENCOUNTER — PROCEDURE VISIT (OUTPATIENT)
Dept: AUDIOLOGY | Age: 79
End: 2024-03-19
Payer: MEDICARE

## 2024-03-19 VITALS
BODY MASS INDEX: 31.34 KG/M2 | DIASTOLIC BLOOD PRESSURE: 78 MMHG | HEIGHT: 61 IN | HEART RATE: 69 BPM | WEIGHT: 166 LBS | SYSTOLIC BLOOD PRESSURE: 118 MMHG | RESPIRATION RATE: 16 BRPM

## 2024-03-19 DIAGNOSIS — H90.3 SENSORINEURAL HEARING LOSS, BILATERAL: Primary | ICD-10-CM

## 2024-03-19 DIAGNOSIS — H90.A22 SENSORINEURAL HEARING LOSS (SNHL) OF LEFT EAR WITH RESTRICTED HEARING OF RIGHT EAR: Primary | ICD-10-CM

## 2024-03-19 DIAGNOSIS — H93.8X1 EAR PRESSURE, RIGHT: ICD-10-CM

## 2024-03-19 PROCEDURE — 92567 TYMPANOMETRY: CPT | Performed by: AUDIOLOGIST

## 2024-03-19 PROCEDURE — 99213 OFFICE O/P EST LOW 20 MIN: CPT | Performed by: OTOLARYNGOLOGY

## 2024-03-19 PROCEDURE — 92557 COMPREHENSIVE HEARING TEST: CPT | Performed by: AUDIOLOGIST

## 2024-03-19 PROCEDURE — 1123F ACP DISCUSS/DSCN MKR DOCD: CPT | Performed by: OTOLARYNGOLOGY

## 2024-03-19 ASSESSMENT — ENCOUNTER SYMPTOMS
VOICE CHANGE: 0
COUGH: 0
TROUBLE SWALLOWING: 0
SORE THROAT: 0
SHORTNESS OF BREATH: 0
APNEA: 0
FACIAL SWELLING: 0
EYE ITCHING: 0
SINUS PRESSURE: 0

## 2024-03-19 NOTE — PROGRESS NOTES
Lexi Romero   1945, 78 y.o. female   [id number]       Referring Provider: Robyn Roper DO  Referral Type: In an order in Epic    Reason for Visit: Evaluation of suspected change in hearing, tinnitus, or balance.    ADULT AUDIOLOGIC EVALUATION      Lexi Romero is a 78 y.o. female seen today, 3/19/2024 , for a recheck audiologic evaluation.  Patient was seen by Robyn Roper DO following today's evaluation. Patient was accompanied by sister.    AUDIOLOGIC AND OTHER PERTINENT MEDICAL HISTORY:      Lexi Romero noted aural fullness.  Patient reports right ear aural fullness for the past few months.  Previous audiologic evaluation was completed on 06/09/2023 which showed an asymmetric hearing loss.    Lexi Romero denied otalgia, tinnitus, and dizziness.    Date: 3/19/2024     IMPRESSIONS:      Abnormal middle ear compliance with normal pressure in the right ear and normal pressure and compliance in the left ear.  Abnormal asymmetric hearing loss, left worse than right, which can affect every day listening needs.  Word understanding was poor in the left ear and excellent in the right ear presented at elevated sensation levels, bilaterally.  Stable hearing thresholds but significant improvement in left ear word understanding since previous audiologic evaluation.  Hearing aid in the left ear is recommended at this time.  Follow medical recommendations of Robyn Roper DO.     ASSESSMENT AND FINDINGS:     Otoscopy revealed: occluding cerumen, bilaterally.  Cerumen was removed without incidence via curette, bilaterally.  Clear ear canals bilaterally    RIGHT EAR:  Hearing Sensitivity: Normal hearing sensitivity to a moderate sensorineural hearing loss from 250-8000 Hz  Speech Recognition Threshold: 15 dB HL  Word Recognition:Excellent (96%), based on NU-6 25-word list at 55 dBHL using recorded speech stimuli.    Tympanometry: Normal peak pressure with low compliance, Type As tympanogram, consistent with

## 2024-03-19 NOTE — PROGRESS NOTES
daily  0    Cholecalciferol (VITAMIN D3) 50 MCG (2000 UT) CAPS Take 1,000 Units by mouth daily      CALCIUM PO Take by mouth daily       No current facility-administered medications for this visit.       Review of Systems     Review of Systems   Constitutional:  Negative for appetite change, chills, fatigue, fever and unexpected weight change.   HENT:  Positive for hearing loss. Negative for congestion, ear discharge, ear pain, facial swelling, nosebleeds, postnasal drip, sinus pressure, sneezing, sore throat, tinnitus, trouble swallowing and voice change.    Eyes:  Negative for itching.   Respiratory:  Negative for apnea, cough and shortness of breath.    Endocrine: Negative for cold intolerance and heat intolerance.   Musculoskeletal:  Negative for myalgias and neck pain.   Skin:  Negative for rash.   Allergic/Immunologic: Negative for environmental allergies.   Neurological:  Negative for dizziness and headaches.   Psychiatric/Behavioral:  Negative for confusion, decreased concentration and sleep disturbance.          PhysicalExam     Vitals:    03/19/24 1134   BP: 118/78   Pulse: 69   Resp: 16   Weight: 75.3 kg (166 lb)   Height: 1.549 m (5' 1\")       Constitutional:       Appearance: Normal appearance.   HENT:      Head: Normocephalic.      Nose: Nose normal.      Ears: Bilateral EACs clear, TMs intact, middle ear is clear  Eyes:      Extraocular Movements: Extraocular movements intact.   Neck:      Musculoskeletal: Normal range of motion.   Neurological:      General: No focal deficit present.      Mental Status: She is alert and oriented to person, place, and time.   Psychiatric:         Mood and Affect: Mood normal.         Behavior: Behavior normal.         Thought Content: Thought content normal.             Assessment and Plan     1. Sensorineural hearing loss (SNHL) of left ear with restricted hearing of right ear  Audiogram reviewed independent interpreted right ear normal downsloping to mild

## 2024-04-04 ENCOUNTER — TELEPHONE (OUTPATIENT)
Age: 79
End: 2024-04-04

## 2024-04-04 RX ORDER — RIVASTIGMINE 13.3 MG/24H
1 PATCH, EXTENDED RELEASE TRANSDERMAL DAILY
Qty: 90 PATCH | Refills: 3 | Status: CANCELLED | OUTPATIENT
Start: 2024-04-04

## 2024-04-04 RX ORDER — RIVASTIGMINE 13.3 MG/24H
1 PATCH, EXTENDED RELEASE TRANSDERMAL DAILY
Qty: 90 PATCH | Refills: 3 | Status: SHIPPED | OUTPATIENT
Start: 2024-04-04 | End: 2024-04-04

## 2024-04-04 RX ORDER — RIVASTIGMINE 13.3 MG/24H
1 PATCH, EXTENDED RELEASE TRANSDERMAL DAILY
Qty: 30 PATCH | OUTPATIENT
Start: 2024-04-04

## 2024-04-04 RX ORDER — RIVASTIGMINE 9.5 MG/24H
1 PATCH, EXTENDED RELEASE TRANSDERMAL DAILY
Qty: 30 PATCH | Refills: 0 | Status: SHIPPED | OUTPATIENT
Start: 2024-04-04

## 2024-04-04 NOTE — TELEPHONE ENCOUNTER
PATIENT WANTS TO KNOW IF THE STRENGTH OF THESE CAN BE INCREASED?       rivastigmine (EXELON) 9.5 MG/24HR       IF YES CAN YOU SEND A NEW SCRIPT TO [facility]?     GAVIN

## 2024-04-04 NOTE — TELEPHONE ENCOUNTER
Requested Prescriptions     Pending Prescriptions Disp Refills    rivastigmine (EXELON) 13.3 MG/24HR 30 patch      Sig: Place 1 patch onto the skin daily       Last OV: 2/14/2024    Last labs: 11/29/2023     F/u: 5/22/2024

## 2024-04-04 NOTE — TELEPHONE ENCOUNTER
Per Mary Beth:    This is the one pt needs and needs to be sent to the [facility] listed and not [facility] the 9.5 mg will be canceled.    Thank you!    Requested Prescriptions     Pending Prescriptions Disp Refills    rivastigmine (EXELON) 13.3 MG/24HR 90 patch 3     Sig: Place 1 patch onto the skin daily

## 2024-05-17 RX ORDER — RIVASTIGMINE 9.5 MG/24H
PATCH, EXTENDED RELEASE TRANSDERMAL
Qty: 90 PATCH | Refills: 0 | Status: SHIPPED | OUTPATIENT
Start: 2024-05-17

## 2024-05-17 NOTE — TELEPHONE ENCOUNTER
Requested Prescriptions     Pending Prescriptions Disp Refills    rivastigmine (EXELON) 9.5 MG/24HR [Pharmacy Med Name: RIVASTIGMINE 9.5MG/24H PATCH] 90 patch      Sig: APPLY 1 PATCH TOPICALLY TO THE SKIN DAILY         Last OV: 2/14/2024    Last labs: 11/29/2023     F/u: 5/22/2024

## 2024-05-21 PROBLEM — U07.1 COVID-19 VIRUS INFECTION: Status: RESOLVED | Noted: 2022-11-21 | Resolved: 2024-05-21

## 2024-05-21 NOTE — PROGRESS NOTES
clear, no wheezing or rales. Normal symmetric air entry throughout both lung fields.  Heart regular with normal rate, no murmer or gallop   + pes planus.  No tenderness, swelling, ecchymosis, rash foot.  PT pulses 2/4  Gait is mildly antalgic  Assessment:       Diagnosis Orders   1. Alzheimer disease (HCC)  rivastigmine (EXELON) 13.3 MG/24HR  Has good support.  Increase Exelon to 13.3 mg.   Encouraged diet high in fruits, veg and omega 3 FA in addition to exericse.       2. WASHINGTON (obstructive sleep apnea)  Not tolerant of CPAP      3. Immunosuppression due to chronic steroid use ([facility])  Shingles vaccine encouraged; COVID 6 month booster and RSV addressed and encouraged      4. Acromegaly and gigantism ([facility])  Per Dr. Roper      5. Moderate episode of recurrent major depressive disorder ([facility])  Doing well.  Monitor       6. Estrogen deficiency  DEXA BONE DENSITY 2 SITES              7. Tension headache  Likely exacerbated by sleep apnea.  Life style addressed.  Declines trial daily med to prevent HA; will continue PRN Tylenol        8. Plantar fasciitis - Exercises and informational handout reviewed and copy provided.  Ice, prn.  Importance of supportive shoes with good cushioning addressed.  PT if HEP/proper footwear are not effective.       Plan:      Side effects of current medications reviewed and questions answered.   Follow up in 3 months or prn.

## 2024-05-22 ENCOUNTER — OFFICE VISIT (OUTPATIENT)
Age: 79
End: 2024-05-22

## 2024-05-22 VITALS
WEIGHT: 165.5 LBS | SYSTOLIC BLOOD PRESSURE: 110 MMHG | TEMPERATURE: 97.5 F | RESPIRATION RATE: 16 BRPM | HEART RATE: 69 BPM | BODY MASS INDEX: 31.27 KG/M2 | DIASTOLIC BLOOD PRESSURE: 68 MMHG | OXYGEN SATURATION: 98 %

## 2024-05-22 DIAGNOSIS — M72.2 PLANTAR FASCIITIS: ICD-10-CM

## 2024-05-22 DIAGNOSIS — E28.39 ESTROGEN DEFICIENCY: ICD-10-CM

## 2024-05-22 DIAGNOSIS — Z79.52 IMMUNOSUPPRESSION DUE TO CHRONIC STEROID USE (HCC): ICD-10-CM

## 2024-05-22 DIAGNOSIS — G30.9 ALZHEIMER DISEASE (HCC): Primary | ICD-10-CM

## 2024-05-22 DIAGNOSIS — T38.0X5A IMMUNOSUPPRESSION DUE TO CHRONIC STEROID USE (HCC): ICD-10-CM

## 2024-05-22 DIAGNOSIS — F33.1 MODERATE EPISODE OF RECURRENT MAJOR DEPRESSIVE DISORDER (HCC): ICD-10-CM

## 2024-05-22 DIAGNOSIS — E22.0 ACROMEGALY AND GIGANTISM (HCC): ICD-10-CM

## 2024-05-22 DIAGNOSIS — F02.80 ALZHEIMER DISEASE (HCC): Primary | ICD-10-CM

## 2024-05-22 DIAGNOSIS — G47.33 OSA (OBSTRUCTIVE SLEEP APNEA): ICD-10-CM

## 2024-05-22 DIAGNOSIS — D84.821 IMMUNOSUPPRESSION DUE TO CHRONIC STEROID USE (HCC): ICD-10-CM

## 2024-05-22 DIAGNOSIS — G44.209 TENSION HEADACHE: ICD-10-CM

## 2024-05-22 PROBLEM — D64.9 ANEMIA: Status: RESOLVED | Noted: 2018-04-10 | Resolved: 2024-05-22

## 2024-05-22 PROBLEM — R41.3 MEMORY LOSS: Status: RESOLVED | Noted: 2022-02-21 | Resolved: 2024-05-22

## 2024-05-22 RX ORDER — RIVASTIGMINE 13.3 MG/24H
1 PATCH, EXTENDED RELEASE TRANSDERMAL DAILY
Qty: 90 PATCH | Refills: 3 | Status: SHIPPED | OUTPATIENT
Start: 2024-05-22

## 2024-05-22 SDOH — ECONOMIC STABILITY: FOOD INSECURITY: WITHIN THE PAST 12 MONTHS, YOU WORRIED THAT YOUR FOOD WOULD RUN OUT BEFORE YOU GOT MONEY TO BUY MORE.: NEVER TRUE

## 2024-05-22 SDOH — ECONOMIC STABILITY: FOOD INSECURITY: WITHIN THE PAST 12 MONTHS, THE FOOD YOU BOUGHT JUST DIDN'T LAST AND YOU DIDN'T HAVE MONEY TO GET MORE.: NEVER TRUE

## 2024-05-22 SDOH — ECONOMIC STABILITY: HOUSING INSECURITY
IN THE LAST 12 MONTHS, WAS THERE A TIME WHEN YOU DID NOT HAVE A STEADY PLACE TO SLEEP OR SLEPT IN A SHELTER (INCLUDING NOW)?: NO

## 2024-05-22 SDOH — ECONOMIC STABILITY: INCOME INSECURITY: HOW HARD IS IT FOR YOU TO PAY FOR THE VERY BASICS LIKE FOOD, HOUSING, MEDICAL CARE, AND HEATING?: NOT HARD AT ALL

## 2024-06-12 DIAGNOSIS — I48.91 ATRIAL FIBRILLATION, UNSPECIFIED TYPE (HCC): ICD-10-CM

## 2024-06-12 RX ORDER — RIVAROXABAN 20 MG/1
20 TABLET, FILM COATED ORAL DAILY
Qty: 90 TABLET | Refills: 1 | Status: SHIPPED | OUTPATIENT
Start: 2024-06-12

## 2024-06-12 NOTE — TELEPHONE ENCOUNTER
Requested Prescriptions     Pending Prescriptions Disp Refills    XARELTO 20 MG TABS tablet [Pharmacy Med Name: XARELTO 20MG TABLETS] 90 tablet 1     Sig: TAKE 1 TABLET BY MOUTH DAILY WITH BREAKFAST         Last OV: 5/22/2024    Last labs: 11/29/2023     F/u: 8/26/2024

## 2024-07-31 ENCOUNTER — TELEPHONE (OUTPATIENT)
Age: 79
End: 2024-07-31

## 2024-07-31 NOTE — PROGRESS NOTES
Subjective:      Patient ID: Lexi Romero 79 y.o. female. is here for evaluation for fall and rib pain      HPI    Tripped going up step to deck yesterday, landed on the left side of her chest.  Hit the wooden step.  No head injury, LOC, HA, nausea or vomiting.  Has tenderness left lateral chest wall.  Mild pain to take a deep breath.   No to very little pain at rest.  Mild pain with activity.   Rx: Tylenol helps.  She does not fall often.  She says she has never been coordinated.        Outpatient Medications Marked as Taking for the 8/1/24 encounter (Office Visit) with Alison Hi MD   Medication Sig Dispense Refill    XARELTO 20 MG TABS tablet TAKE 1 TABLET BY MOUTH DAILY WITH BREAKFAST 90 tablet 1    rivastigmine (EXELON) 13.3 MG/24HR Place 1 patch onto the skin daily 90 patch 3    pantoprazole (PROTONIX) 40 MG tablet Take 1 tablet by mouth daily 90 tablet 3    ondansetron (ZOFRAN) 4 MG tablet Take 1 tablet by mouth 3 times daily as needed for Nausea or Vomiting 15 tablet 0    levothyroxine (SYNTHROID) 75 MCG tablet Take 1 tablet by mouth Daily 90 tablet 3    atorvastatin (LIPITOR) 20 MG tablet Take 1 tablet by mouth daily 90 tablet 3    predniSONE (DELTASONE) 5 MG tablet Take 1 tablet by mouth daily Increase per endocrinologist's instructions PRN acute illness or stress. (Patient taking differently: Take 0.5 tablets by mouth 2 times daily Increase per endocrinologist's instructions PRN acute illness or stress.) 180 tablet 1    Vitamin B Complex-C CAPS Take 1 capsule by mouth daily  0    Cholecalciferol (VITAMIN D3) 50 MCG (2000 UT) CAPS Take 1,000 Units by mouth daily      CALCIUM PO Take by mouth daily          Allergies   Allergen Reactions    Sulfa Antibiotics      anaphylaxis    Codeine      vomiting    Demerol      nausea    Flurbiprofen Itching    Nsaids Itching and Dermatitis     States that all NSAIDS cause severe itching       Patient Active Problem List   Diagnosis    Hypopituitarism

## 2024-07-31 NOTE — TELEPHONE ENCOUNTER
Radha called, patient fell going up stair lift- hurt ribs   Doesn't want to go to ER-   Not experiencing shortness of breath - made appointment for tomorrow at 11:30

## 2024-08-01 ENCOUNTER — HOSPITAL ENCOUNTER (OUTPATIENT)
Dept: GENERAL RADIOLOGY | Age: 79
Discharge: HOME | End: 2024-08-01
Payer: MEDICARE

## 2024-08-01 ENCOUNTER — TELEPHONE (OUTPATIENT)
Age: 79
End: 2024-08-01

## 2024-08-01 ENCOUNTER — OFFICE VISIT (OUTPATIENT)
Age: 79
End: 2024-08-01

## 2024-08-01 VITALS
DIASTOLIC BLOOD PRESSURE: 68 MMHG | BODY MASS INDEX: 29.74 KG/M2 | RESPIRATION RATE: 16 BRPM | TEMPERATURE: 97.5 F | HEART RATE: 63 BPM | OXYGEN SATURATION: 97 % | SYSTOLIC BLOOD PRESSURE: 110 MMHG | WEIGHT: 157.4 LBS

## 2024-08-01 DIAGNOSIS — S20.212A CONTUSION OF RIB ON LEFT SIDE, INITIAL ENCOUNTER: Primary | ICD-10-CM

## 2024-08-01 DIAGNOSIS — R07.81 RIB PAIN ON LEFT SIDE: ICD-10-CM

## 2024-08-01 DIAGNOSIS — R48.2 GAIT APRAXIA: ICD-10-CM

## 2024-08-01 PROCEDURE — 71100 X-RAY EXAM RIBS UNI 2 VIEWS: CPT

## 2024-08-01 RX ORDER — LIDOCAINE 4 G/G
1 PATCH TOPICAL DAILY
Qty: 30 PATCH | Refills: 0 | Status: SHIPPED | OUTPATIENT
Start: 2024-08-01 | End: 2024-08-31

## 2024-08-01 RX ORDER — PREDNISONE 5 MG/1
5 TABLET ORAL 2 TIMES DAILY
Qty: 200 TABLET | Refills: 1 | Status: SHIPPED | OUTPATIENT
Start: 2024-08-01

## 2024-08-01 NOTE — TELEPHONE ENCOUNTER
Nolvia called asking to get official read on xray    EXAM: XR RIBS LEFT (2 VIEWS)      INDICATION: Pleurodynia      TECHNIQUE: AP view of the chest, oblique views of the left ribs, 3 total views     COMPARISON: None.      FINDINGS:  Medical Devices: None.     Heart and Mediastinum: Cardiomediastinal silhouette is within normal limits.     Lungs and Pleura: Lungs are clear with no pleural effusions or evidence of  pneumothorax.     Bones and soft tissues: No acute osseous abnormalities. No rib fractures.  Degenerative changes along the bilateral shoulders.     IMPRESSION:  No acute cardiopulmonary abnormality. No rib fractures.     Electronically signed by Marvin Ivy

## 2024-08-02 NOTE — TELEPHONE ENCOUNTER
Tell her the xray is normal; no fractures and lung and heart are normal.  Let me know if lidocaine patches are not helping.  Thanks

## 2024-08-26 ENCOUNTER — OFFICE VISIT (OUTPATIENT)
Age: 79
End: 2024-08-26
Payer: MEDICARE

## 2024-08-26 VITALS
WEIGHT: 158.9 LBS | HEIGHT: 61 IN | DIASTOLIC BLOOD PRESSURE: 68 MMHG | TEMPERATURE: 98 F | BODY MASS INDEX: 30 KG/M2 | OXYGEN SATURATION: 95 % | RESPIRATION RATE: 16 BRPM | SYSTOLIC BLOOD PRESSURE: 104 MMHG | HEART RATE: 67 BPM

## 2024-08-26 DIAGNOSIS — F02.80 ALZHEIMER DISEASE (HCC): ICD-10-CM

## 2024-08-26 DIAGNOSIS — E28.39 ESTROGEN DEFICIENCY: ICD-10-CM

## 2024-08-26 DIAGNOSIS — H90.3 SENSORINEURAL HEARING LOSS (SNHL) OF BOTH EARS: ICD-10-CM

## 2024-08-26 DIAGNOSIS — E78.00 PURE HYPERCHOLESTEROLEMIA: Chronic | ICD-10-CM

## 2024-08-26 DIAGNOSIS — Z12.31 ENCOUNTER FOR SCREENING MAMMOGRAM FOR MALIGNANT NEOPLASM OF BREAST: ICD-10-CM

## 2024-08-26 DIAGNOSIS — Z00.00 MEDICARE ANNUAL WELLNESS VISIT, SUBSEQUENT: Primary | ICD-10-CM

## 2024-08-26 DIAGNOSIS — E23.0 HYPOPITUITARISM (HCC): ICD-10-CM

## 2024-08-26 DIAGNOSIS — Z79.01 ANTICOAGULATED: ICD-10-CM

## 2024-08-26 DIAGNOSIS — R73.02 IMPAIRED GLUCOSE TOLERANCE: ICD-10-CM

## 2024-08-26 DIAGNOSIS — G30.9 ALZHEIMER DISEASE (HCC): ICD-10-CM

## 2024-08-26 PROCEDURE — 1123F ACP DISCUSS/DSCN MKR DOCD: CPT | Performed by: FAMILY MEDICINE

## 2024-08-26 PROCEDURE — G0439 PPPS, SUBSEQ VISIT: HCPCS | Performed by: FAMILY MEDICINE

## 2024-08-26 ASSESSMENT — PATIENT HEALTH QUESTIONNAIRE - PHQ9
1. LITTLE INTEREST OR PLEASURE IN DOING THINGS: NOT AT ALL
SUM OF ALL RESPONSES TO PHQ QUESTIONS 1-9: 4
2. FEELING DOWN, DEPRESSED OR HOPELESS: NOT AT ALL
10. IF YOU CHECKED OFF ANY PROBLEMS, HOW DIFFICULT HAVE THESE PROBLEMS MADE IT FOR YOU TO DO YOUR WORK, TAKE CARE OF THINGS AT HOME, OR GET ALONG WITH OTHER PEOPLE: NOT DIFFICULT AT ALL
7. TROUBLE CONCENTRATING ON THINGS, SUCH AS READING THE NEWSPAPER OR WATCHING TELEVISION: SEVERAL DAYS
6. FEELING BAD ABOUT YOURSELF - OR THAT YOU ARE A FAILURE OR HAVE LET YOURSELF OR YOUR FAMILY DOWN: NOT AT ALL
SUM OF ALL RESPONSES TO PHQ QUESTIONS 1-9: 4
4. FEELING TIRED OR HAVING LITTLE ENERGY: SEVERAL DAYS
SUM OF ALL RESPONSES TO PHQ9 QUESTIONS 1 & 2: 0
5. POOR APPETITE OR OVEREATING: SEVERAL DAYS
9. THOUGHTS THAT YOU WOULD BE BETTER OFF DEAD, OR OF HURTING YOURSELF: NOT AT ALL
3. TROUBLE FALLING OR STAYING ASLEEP: SEVERAL DAYS
8. MOVING OR SPEAKING SO SLOWLY THAT OTHER PEOPLE COULD HAVE NOTICED. OR THE OPPOSITE, BEING SO FIGETY OR RESTLESS THAT YOU HAVE BEEN MOVING AROUND A LOT MORE THAN USUAL: NOT AT ALL

## 2024-08-26 ASSESSMENT — LIFESTYLE VARIABLES
HOW MANY STANDARD DRINKS CONTAINING ALCOHOL DO YOU HAVE ON A TYPICAL DAY: PATIENT DOES NOT DRINK
HOW OFTEN DO YOU HAVE A DRINK CONTAINING ALCOHOL: NEVER

## 2024-08-26 NOTE — PROGRESS NOTES
Subjective:      Patient ID: Lexi Romero 79 y.o. female. The primary encounter diagnosis was Alzheimer disease (HCC). Diagnoses of Hypopituitarism (HCC), Impaired glucose tolerance, and Pure hypercholesterolemia were also pertinent to this visit.      HPI    Alzheimers: diagnosed by Dr. Warner.  On Exelon.  Had GI upset with Aricept.  She notes her memory is slowly declining.  Continues to be independent in ADLs.  Denies feeling confused.  Often does not recall the date.  Does not feel depressed or anxious.  Is not exercising.  Diet: does well with fish a few times a week but does eat fruit and vegetables everyday.    Does not drive.  Is compliant with medications; sometimes has to be reminded and is supervised by roommate.  Has medisets.       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  Last appt.  Labs 12/2023 reviewed on Care Everywhere.    Impaired glucose tolerance: managed with diet and exercise.      Hyperlipidemia:  managed with Lipitor, diet, exercise.  No myalgias.   Patient denies any exertional chest pain, dyspnea, palpitations, syncope, orthopnea, edema or paroxysmal nocturnal dyspnea.     Lab Results   Component Value Date     11/29/2023    K 4.8 11/29/2023     11/29/2023    CO2 28 11/29/2023    BUN 22 (H) 11/29/2023    CREATININE 0.9 11/29/2023    GLUCOSE 107 (H) 11/29/2023    CALCIUM 10.7 (H) 11/29/2023    BILITOT 0.3 11/29/2023    ALKPHOS 82 11/29/2023    AST 16 11/29/2023    ALT 11 11/29/2023    LABGLOM >60 11/29/2023    GFRAA >60 10/11/2021    AGRATIO 1.4 11/29/2023    GLOB 3.7 10/11/2021        Lab Results   Component Value Date    WBC 12.8 (H) 11/29/2023    HGB 12.8 11/29/2023    HCT 40.2 11/29/2023    MCV 84.9 11/29/2023     11/29/2023     Hemoglobin A1C   Date Value Ref Range Status   05/08/2023 5.8 See comment % Final     Comment:     Comment:  Diagnosis of Diabetes: > or = 6.5%  Increased risk of diabetes (Prediabetes): 5.7-6.4%  Glycemic Control:  unspecified obesity type    [place] (obstructive sleep apnea)    Plantar fasciitis    Tension headache       Past Medical History:   Diagnosis Date    A-fib (HCC)     Allergic rhinitis     Anxiety     COVID-19 virus infection 11/21/2022    Depression     GERD (gastroesophageal reflux disease)     Hearing loss     History of blood transfusion     Hypothyroidism     Moderate episode of recurrent major depressive disorder (HCC) 01/06/2023    Osteoporosis     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    Restless legs syndrome (RLS)     Sleep apnea     Wears glasses        Past Surgical History:   Procedure Laterality Date    CARPAL TUNNEL RELEASE Bilateral     CHOLECYSTECTOMY  05/13/2013    DIAGNOSTIC LAPAROSCOPY,LIGATION OF RIGHT ROUND    HERNIA REPAIR N/A 08/11/2020    ROBOTIC INCISIONAL INCARCERATED HERNIA REPAIR WITH MESH performed by Saúl Green DO at [facility] OR    JOINT REPLACEMENT Bilateral     2006 & 2007 TKR    LARYNGOSCOPY N/A 8/28/2023    DRUG INDUCED SLEEP ENDOSCOPY performed by Robyn Roper DO at [facility] OR    PITUITARY EXCISION  01/01/1990 5/6 removed - Dr Momo Fatima        Family History   Problem Relation Age of Onset    Diabetes Mother     Pancreatic Cancer Father     No Known Problems Sister     No Known Problems Brother     No Known Problems Maternal Aunt     No Known Problems Maternal Uncle     No Known Problems Paternal Aunt     No Known Problems Paternal Uncle     No Known Problems Maternal Grandmother     No Known Problems Maternal Grandfather     No Known Problems Paternal Grandmother     No Known Problems Paternal Grandfather     No Known Problems Other     Anesth Problems Neg Hx     Malig Hyperten Neg Hx     Hypotension Neg Hx     Malig Hypertherm Neg Hx     Pseudochol. Deficiency Neg Hx        Social History     Tobacco Use    Smoking status: Never    Smokeless tobacco: Never   Vaping Use    Vaping status: Never Used   Substance Use Topics    Alcohol use: No    Drug use: No

## 2024-08-26 NOTE — PATIENT INSTRUCTIONS
apply for a job where recognizing different colors is important, such as , electronics, or the .  How are vision tests done?  Visual acuity test   You cover one eye at a time.  You read aloud from a wall chart across the room.  You read aloud from a small card that you hold in your hand.  Refraction   You look into a special device.  The device puts lenses of different strengths in front of each eye to see how strong your glasses or contact lenses need to be.  Visual field tests   Your doctor may have you look through special machines.  Or your doctor may simply have you stare straight ahead while they move a finger into and out of your field of vision.  Color vision test   You look at pieces of printed test patterns in various colors. You say what number or symbol you see.  Your doctor may have you trace the number or symbol using a pointer.  How do these tests feel?  There is very little chance of having a problem from this test. If dilating drops are used for a vision test, they may make the eyes sting and cause a medicine taste in the mouth.  Follow-up care is a key part of your treatment and safety. Be sure to make and go to all appointments, and call your doctor if you are having problems. It's also a good idea to know your test results and keep a list of the medicines you take.  Where can you learn more?  Go to https://www.[employer].net/patientEd and enter G551 to learn more about \"Learning About Vision Tests.\"  Current as of: June 5, 2023  Content Version: 14.1  © 4057-6602 [employer].   Care instructions adapted under license by [employer]. If you have questions about a medical condition or this instruction, always ask your healthcare professional. [employer] disclaims any warranty or liability for your use of this information.           Eating Healthy Foods: Care Instructions  With every meal, you can make healthy food choices. Try to eat a variety of  are paid in full while other may be subject to a deductible, co-insurance, and/or copay.    Some of these benefits include a comprehensive review of your medical history including lifestyle, illnesses that may run in your family, and various assessments and screenings as appropriate.    After reviewing your medical record and screening and assessments performed today your provider may have ordered immunizations, labs, imaging, and/or referrals for you.  A list of these orders (if applicable) as well as your Preventive Care list are included within your After Visit Summary for your review.    Other Preventive Recommendations:    A preventive eye exam performed by an eye specialist is recommended every 1-2 years to screen for glaucoma; cataracts, macular degeneration, and other eye disorders.  A preventive dental visit is recommended every 6 months.  Try to get at least 150 minutes of exercise per week or 10,000 steps per day on a pedometer .  Order or download the FREE \"Exercise & Physical Activity: Your Everyday Guide\" from [employer]. Call 1-847.191.2526 or search [employer] online.  You need 1513-0113 mg of calcium and 0812-6774 IU of vitamin D per day. It is possible to meet your calcium requirement with diet alone, but a vitamin D supplement is usually necessary to meet this goal.  When exposed to the sun, use a sunscreen that protects against both UVA and UVB radiation with an SPF of 30 or greater. Reapply every 2 to 3 hours or after sweating, drying off with a towel, or swimming.  Always wear a seat belt when traveling in a car. Always wear a helmet when riding a bicycle or motorcycle.

## 2024-08-28 ENCOUNTER — PROCEDURE VISIT (OUTPATIENT)
Dept: AUDIOLOGY | Age: 79
End: 2024-08-28
Payer: MEDICARE

## 2024-08-28 DIAGNOSIS — H90.3 SENSORINEURAL HEARING LOSS, BILATERAL: Primary | ICD-10-CM

## 2024-08-28 DIAGNOSIS — H91.8X3 ASYMMETRICAL HEARING LOSS: ICD-10-CM

## 2024-08-28 DIAGNOSIS — H93.13 TINNITUS, BILATERAL: ICD-10-CM

## 2024-08-28 PROCEDURE — 92557 COMPREHENSIVE HEARING TEST: CPT | Performed by: AUDIOLOGIST

## 2024-08-28 NOTE — PATIENT INSTRUCTIONS
Good Communication Strategies    Communication can be challenging for anyone, but can be especially difficult for those with some degree of hearing loss.  While we may not be able to control every factor that may lead to difficulty with communication, there are Good Communication Strategies that we can all use in our day-to-day lives.  Communication takes both parties working together for it to be successful.    Tips as a Listener:   Control your environment.  It is important to limit the amount of background noise in the room when possible.  You should also consider having a good light source in the room to best see the other person.  Ask for clarification.  Instead of saying \"What?\", you can use parts of what you heard to make a new question.  For example, if you heard the word \"Thursday\" but not the rest of the week, you may ask \"What was that about Thursday?\" or \"What did you want to do Thursday?\".  This shows the person talking that you are listening and will help them better explain what they are saying.  Be an advocate for yourself.  If you are hearing but not understanding, tell the other person \"I can hear you, but I need you to slow down when you speak.\"  Or if someone is facing the other direction, say \"I cannot hear you when you are not looking at me when we talk.\"       Tips as a Talker:   - Sit or stand 3 to 6 feet away to maximize audibility         -- It is unrealistic to believe someone else will fully hear your message if you are speaking from across the room or in a different room in the house   - Stay at eye level to help with visual cues   - Make sure you have the person’s attention before speaking   - Use facial expressions and gestures to accentuate your message   - Raise your voice slightly (do not scream)   - Speak slowly and distinctly   - Use short, simple sentences   - Rephrase your words if the person is having a hard time understanding you    - To avoid distortion, don’t speak directly  to the sounds it is missing.  There are some hearing aids with built-in noise generator programs, which may help when amplification alone is not enough.        Additional resources may be found through the [employer] at www.betty.org    When should you call for help?  Call 911 anytime you think you may need emergency care. For example, call if:    You have symptoms of a stroke. These may include:  Sudden numbness, tingling, weakness, or loss of movement in your face, arm, or leg, especially on only one side of your body.  Sudden vision changes.  Sudden trouble speaking.  Sudden confusion or trouble understanding simple statements.  Sudden problems with walking or balance.  A sudden, severe headache that is different from past headaches.    Call your doctor now or seek immediate medical care if:    You develop other symptoms. These may include hearing loss (or worse hearing loss), balance problems, dizziness, nausea, or vomiting.    Watch closely for changes in your health, and be sure to contact your doctor if:    Your tinnitus moves from both ears to one ear.     Your hearing loss gets worse within 1 day after an ear injury.     Your tinnitus or hearing loss does not get better within 1 week after an ear injury.     Your tinnitus bothers you enough that you want to take medicines to help you cope with it.      If you notice changes in your tinnitus and/or your hearing, it is recommended that you have your hearing tested by your audiologist and to follow-up with your physician that manages your hearing loss (such as your ENT or Primary Care doctor). Hearing Loss: Care Instructions  Your Care Instructions      Hearing loss is a sudden or slow decrease in how well you hear. It can range from mild to profound. Permanent hearing loss can occur with aging, and it can happen when you are exposed long-term to loud noise. Examples include listening to loud music, riding motorcycles, or being around other

## 2024-08-28 NOTE — PROGRESS NOTES
Lexi Romero   1945, 79 y.o. female   [id number]       Referring Provider: Alison Hi MD   Referral Type: In an order in Epic    Reason for Visit: Evaluation of suspected change in hearing and tinnitus     ADULT AUDIOLOGIC EVALUATION      Lexi Romero is a 79 y.o. female seen today, 8/28/2024 , for a recheck audiologic evaluation.  Patient was accompanied by her sister. Previous audiograms from 3/19/24, 6/9/23, and 12/7/20 viewable in medical record.     AUDIOLOGIC AND OTHER PERTINENT MEDICAL HISTORY:      Lexi Romero noted a perceived decline in hearing of the left ear since previous evaluation. Per previous history reports, patient notes history of left sided hearing loss in 2020, bilateral tinnitus, and family history of hearing loss in father secondary to noise exposure.      Lexi Romero denied otalgia, otorrhea, history of occupational/recreational noise exposure, history of head trauma, and history of ear surgery.    Date: 8/28/2024     IMPRESSIONS:      Today's results revealed asymmetric sensorineural hearing loss with left ear worse than right. Hearing stable compared to previous audiograms.Hearing loss significant enough to create hearing difficulty in at least some listening situations . Discussed benefits of amplification (in left ear) including medical benefits such as balance, decreased falls risk, and memory/cognition.  Patient is scheduled to return for hearing aid evaluation on 9/10/24 with Festus Acosta.     ASSESSMENT AND FINDINGS:     Otoscopy revealed: Clear ear canals bilaterally    RIGHT EAR:  Hearing Sensitivity: Within normal limits through 3kHz sloping to a mild sensorineural hearing loss.   Speech Recognition Threshold: 15 dB HL  Word Recognition: Excellent 96%, based on NU-6 25-word list at 55 dBHL using recorded speech stimuli.        LEFT EAR:  Hearing Sensitivity: Mild sloping to moderate to severe to profound sensorineural hearing loss.   Speech  Recognition Threshold: 50 dB HL  Word Recognition: Fair 76%, based on NU-6 25-word list at 90 dBHL masked using recorded speech stimuli.        Reliability: Good   Transducer: Inserts    See scanned audiogram dated 8/28/2024  for results.        PATIENT EDUCATION:       The following items were discussed with the patient:   - Good Communication Strategies  - Hearing Loss and Hearing Aids  - Tinnitus Management Strategies      Educational information was shared in the After Visit Summary.                                                RECOMMENDATIONS:                                                                                                                                                                                                                                                            The following items are recommended based on patient report and results from today's appointment:   - Continue medical follow-up with Alison Hi MD .    - Retest hearing as medically indicated and/or sooner if a change in hearing is noted.  - If desired, schedule a Hearing Aid Evaluation (HAE) appointment to discuss hearing aid options.  - Utilize \"Good Communication Strategies\" as discussed to assist in speech understanding with communication partners.  - Maintain a sound enriched environment to assist in the management of tinnitus symptoms.       Mila Julio, Festus  Audiologist    Chart CC'd to: Alison Hi MD       Degree of   Hearing Sensitivity dB Range   Within Normal Limits (WNL) 0 - 20   Mild 20 - 40   Moderate 40 - 55   Moderately-Severe 55 - 70   Severe 70 - 90   Profound 90 +

## 2024-10-09 ENCOUNTER — TELEPHONE (OUTPATIENT)
Age: 79
End: 2024-10-09

## 2024-10-09 DIAGNOSIS — R11.0 NAUSEA: ICD-10-CM

## 2024-10-09 DIAGNOSIS — K21.9 GASTROESOPHAGEAL REFLUX DISEASE, UNSPECIFIED WHETHER ESOPHAGITIS PRESENT: ICD-10-CM

## 2024-10-09 RX ORDER — ONDANSETRON 4 MG/1
4 TABLET, FILM COATED ORAL 3 TIMES DAILY PRN
Qty: 15 TABLET | Refills: 0 | Status: SHIPPED | OUTPATIENT
Start: 2024-10-09

## 2024-10-09 RX ORDER — PANTOPRAZOLE SODIUM 40 MG/1
40 TABLET, DELAYED RELEASE ORAL DAILY
Qty: 90 TABLET | Refills: 3 | Status: SHIPPED | OUTPATIENT
Start: 2024-10-09

## 2024-10-09 NOTE — TELEPHONE ENCOUNTER
Requested Prescriptions     Pending Prescriptions Disp Refills    pantoprazole (PROTONIX) 40 MG tablet 90 tablet 3     Sig: Take 1 tablet by mouth daily    ondansetron (ZOFRAN) 4 MG tablet 15 tablet 0     Sig: Take 1 tablet by mouth 3 times daily as needed for Nausea or Vomiting         Last OV: 8/26/2024    Last labs: 11/29/2023     F/u: 11/26/2024    No vomiting, fever, abdominal cramping

## 2024-10-09 NOTE — TELEPHONE ENCOUNTER
Radha called in for Lexi    She stated Lexi needs a refill on medication   pantoprazole (PROTONIX) 40 MG tablet   And when she called it into the pharmacy they stated there are no more refills on medication     Lexi has been sick for 3 weeks now    Upset stomach, nausea, and some diarrhea    Radha stated  pantoprazole is suppose to help with the current sxs Lexi is having

## 2024-10-09 NOTE — TELEPHONE ENCOUNTER
Has been out of medication for 3 weeks.     Radha has been unable to get them from pharmacy. No request from pharmacy have come for these either.

## 2024-10-09 NOTE — TELEPHONE ENCOUNTER
Is she out of these medications or is she having recurrent sxs while still taking them?  If having sxs in spite of medications needs follow up with me or GI.  Thanks

## 2024-10-31 ENCOUNTER — TELEPHONE (OUTPATIENT)
Age: 79
End: 2024-10-31

## 2024-10-31 NOTE — TELEPHONE ENCOUNTER
Pt called stating she needed to speak with Dr Kolton zelaya. I put pt on hold to check if there was a nurse available. Both nurses were in a room assisting other patients. I took pt off hold to let her know both nurses are in a room at the moment but is there a message I can leave for them. Pt stated \"What am I paying 4000 dollars for? Bye!\" And hung up the phone.     Please advise.

## 2024-10-31 NOTE — TELEPHONE ENCOUNTER
4 teeth extracted on Tuesday. They would like to know if she is allowed to stop taking xarelto 2 days before procedure?     Allowed to have COVID vaccine today. Or should she wait until after the procedure.     Okay to lvm if she does not answer.

## 2024-11-22 PROBLEM — F02.80 ALZHEIMER DISEASE (HCC): Status: ACTIVE | Noted: 2024-11-22

## 2024-11-22 PROBLEM — G30.9 ALZHEIMER DISEASE (HCC): Status: ACTIVE | Noted: 2024-11-22

## 2024-11-22 PROBLEM — M72.2 PLANTAR FASCIITIS: Status: RESOLVED | Noted: 2024-05-22 | Resolved: 2024-11-22

## 2024-11-25 DIAGNOSIS — E03.9 ACQUIRED HYPOTHYROIDISM: Chronic | ICD-10-CM

## 2024-11-25 DIAGNOSIS — E23.0 HYPOPITUITARISM (HCC): ICD-10-CM

## 2024-11-25 RX ORDER — LEVOTHYROXINE SODIUM 75 UG/1
75 TABLET ORAL DAILY
Qty: 90 TABLET | Refills: 0 | Status: SHIPPED | OUTPATIENT
Start: 2024-11-25

## 2024-11-25 NOTE — TELEPHONE ENCOUNTER
Requested Prescriptions     Pending Prescriptions Disp Refills    levothyroxine (SYNTHROID) 75 MCG tablet [Pharmacy Med Name: LEVOTHYROXINE 0.075MG (75MCG) TABS] 90 tablet 3     Sig: TAKE 1 TABLET BY MOUTH DAILY         Last OV: 8/26/2024    Last labs: 11/29/24     F/u: 11/26/2024

## 2024-11-26 ENCOUNTER — OFFICE VISIT (OUTPATIENT)
Age: 79
End: 2024-11-26

## 2024-11-26 VITALS
HEART RATE: 70 BPM | TEMPERATURE: 97.5 F | RESPIRATION RATE: 16 BRPM | BODY MASS INDEX: 29.85 KG/M2 | WEIGHT: 158 LBS | DIASTOLIC BLOOD PRESSURE: 60 MMHG | OXYGEN SATURATION: 98 % | SYSTOLIC BLOOD PRESSURE: 110 MMHG

## 2024-11-26 DIAGNOSIS — F02.80 ALZHEIMER DISEASE (HCC): Primary | ICD-10-CM

## 2024-11-26 DIAGNOSIS — E23.0 PANHYPOPITUITARISM (HCC): ICD-10-CM

## 2024-11-26 DIAGNOSIS — E78.00 PURE HYPERCHOLESTEROLEMIA: Chronic | ICD-10-CM

## 2024-11-26 DIAGNOSIS — I48.0 PAROXYSMAL ATRIAL FIBRILLATION (HCC): ICD-10-CM

## 2024-11-26 DIAGNOSIS — E55.9 VITAMIN D DEFICIENCY: ICD-10-CM

## 2024-11-26 DIAGNOSIS — Z23 NEED FOR INFLUENZA VACCINATION: ICD-10-CM

## 2024-11-26 DIAGNOSIS — G30.9 ALZHEIMER DISEASE (HCC): Primary | ICD-10-CM

## 2024-11-26 DIAGNOSIS — R73.02 IMPAIRED GLUCOSE TOLERANCE: ICD-10-CM

## 2024-11-26 DIAGNOSIS — E23.0 HYPOPITUITARISM (HCC): ICD-10-CM

## 2024-11-26 DIAGNOSIS — R11.0 NAUSEA: ICD-10-CM

## 2024-11-26 DIAGNOSIS — G47.33 OSA (OBSTRUCTIVE SLEEP APNEA): ICD-10-CM

## 2024-11-26 DIAGNOSIS — E03.9 ACQUIRED HYPOTHYROIDISM: Chronic | ICD-10-CM

## 2024-11-26 RX ORDER — ONDANSETRON 4 MG/1
4 TABLET, FILM COATED ORAL 3 TIMES DAILY PRN
Qty: 15 TABLET | Refills: 0 | Status: SHIPPED | OUTPATIENT
Start: 2024-11-26

## 2024-11-26 NOTE — PROGRESS NOTES
Immunizations Administered       Name Date Dose Route    Influenza, FLUAD, (age 65 y+), IM, Trivalent PF, 0.5mL 11/26/2024 0.5 mL Intramuscular    Site: Deltoid- Left    Lot: [id number]    NDC: 24815-054-09            
times daily Increase per endocrinologist's instructions to TID PRN acute illness or stress. 200 tablet 1    XARELTO 20 MG TABS tablet TAKE 1 TABLET BY MOUTH DAILY WITH BREAKFAST 90 tablet 1    rivastigmine (EXELON) 13.3 MG/24HR Place 1 patch onto the skin daily 90 patch 3    atorvastatin (LIPITOR) 20 MG tablet Take 1 tablet by mouth daily 90 tablet 3    Vitamin B Complex-C CAPS Take 1 capsule by mouth daily  0    Cholecalciferol (VITAMIN D3) 50 MCG (2000 UT) CAPS Take 1,000 Units by mouth daily      CALCIUM PO Take by mouth daily          Allergies   Allergen Reactions    Sulfa Antibiotics      anaphylaxis    Codeine      vomiting    Demerol      nausea    Flurbiprofen Itching    Nsaids Itching and Dermatitis     States that all NSAIDS cause severe itching       Patient Active Problem List   Diagnosis    Hyperlipemia    Osteoporosis    Acromegaly and gigantism (HCC)    Hypothyroidism    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HCC)    Glucocorticoid deficiency (HCC)    Vitamin D deficiency    Hiatal hernia    Folate deficiency    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)    Atrial fibrillation (HCC)    Non morbid obesity, unspecified obesity type    [place]ASHINGTON (obstructive sleep apnea)    Tension headache    Alzheimer disease (HCC)       Past Medical History:   Diagnosis Date    A-fib (HCC)     Allergic rhinitis     Anxiety     COVID-19 virus infection 11/21/2022    Depression     GERD (gastroesophageal reflux disease)     Hearing loss     History of blood transfusion     Hypothyroidism     Moderate episode of recurrent major depressive disorder (HCC) 01/06/2023    Osteoporosis     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    Restless legs syndrome (RLS)     Sleep apnea     Wears glasses        Past Surgical History:   Procedure Laterality Date    CARPAL TUNNEL RELEASE Bilateral     CHOLECYSTECTOMY  05/13/2013    DIAGNOSTIC LAPAROSCOPY,LIGATION OF RIGHT ROUND    HERNIA REPAIR N/A 08/11/2020    ROBOTIC INCISIONAL

## 2024-11-27 DIAGNOSIS — E23.0 HYPOPITUITARISM (HCC): ICD-10-CM

## 2024-11-27 DIAGNOSIS — R73.02 IMPAIRED GLUCOSE TOLERANCE: ICD-10-CM

## 2024-11-27 DIAGNOSIS — E78.00 PURE HYPERCHOLESTEROLEMIA: Chronic | ICD-10-CM

## 2024-11-27 DIAGNOSIS — E55.9 VITAMIN D DEFICIENCY: ICD-10-CM

## 2024-11-27 DIAGNOSIS — I48.0 PAROXYSMAL ATRIAL FIBRILLATION (HCC): ICD-10-CM

## 2024-11-27 LAB
25(OH)D3 SERPL-MCNC: 40.8 NG/ML
ALBUMIN SERPL-MCNC: 4.2 G/DL (ref 3.4–5)
ALBUMIN/GLOB SERPL: 1.5 {RATIO} (ref 1.1–2.2)
ALP SERPL-CCNC: 68 U/L (ref 40–129)
ALT SERPL-CCNC: 11 U/L (ref 10–40)
ANION GAP SERPL CALCULATED.3IONS-SCNC: 13 MMOL/L (ref 3–16)
AST SERPL-CCNC: 17 U/L (ref 15–37)
BASOPHILS # BLD: 0.1 K/UL (ref 0–0.2)
BASOPHILS NFR BLD: 1 %
BILIRUB SERPL-MCNC: 0.3 MG/DL (ref 0–1)
BUN SERPL-MCNC: 17 MG/DL (ref 7–20)
CALCIUM SERPL-MCNC: 9.9 MG/DL (ref 8.3–10.6)
CHLORIDE SERPL-SCNC: 104 MMOL/L (ref 99–110)
CHOLEST SERPL-MCNC: 177 MG/DL (ref 0–199)
CO2 SERPL-SCNC: 23 MMOL/L (ref 21–32)
CREAT SERPL-MCNC: 0.9 MG/DL (ref 0.6–1.2)
DEPRECATED RDW RBC AUTO: 17.4 % (ref 12.4–15.4)
EOSINOPHIL # BLD: 0 K/UL (ref 0–0.6)
EOSINOPHIL NFR BLD: 0.5 %
FOLATE SERPL-MCNC: >40 NG/ML (ref 4.78–24.2)
GFR SERPLBLD CREATININE-BSD FMLA CKD-EPI: 65 ML/MIN/{1.73_M2}
GLUCOSE SERPL-MCNC: 126 MG/DL (ref 70–99)
HCT VFR BLD AUTO: 36.2 % (ref 36–48)
HDLC SERPL-MCNC: 69 MG/DL (ref 40–60)
HGB BLD-MCNC: 12 G/DL (ref 12–16)
IRON SATN MFR SERPL: 7 % (ref 15–50)
IRON SERPL-MCNC: 23 UG/DL (ref 37–145)
LDLC SERPL CALC-MCNC: 76 MG/DL
LYMPHOCYTES # BLD: 1.1 K/UL (ref 1–5.1)
LYMPHOCYTES NFR BLD: 13.6 %
MCH RBC QN AUTO: 27 PG (ref 26–34)
MCHC RBC AUTO-ENTMCNC: 33.1 G/DL (ref 31–36)
MCV RBC AUTO: 81.6 FL (ref 80–100)
MONOCYTES # BLD: 0.4 K/UL (ref 0–1.3)
MONOCYTES NFR BLD: 4.9 %
NEUTROPHILS # BLD: 6.4 K/UL (ref 1.7–7.7)
NEUTROPHILS NFR BLD: 80 %
PLATELET # BLD AUTO: 238 K/UL (ref 135–450)
PMV BLD AUTO: 10.7 FL (ref 5–10.5)
POTASSIUM SERPL-SCNC: 4.7 MMOL/L (ref 3.5–5.1)
PROLACTIN SERPL IA-MCNC: 1.1 NG/ML
PROT SERPL-MCNC: 7 G/DL (ref 6.4–8.2)
RBC # BLD AUTO: 4.44 M/UL (ref 4–5.2)
SODIUM SERPL-SCNC: 140 MMOL/L (ref 136–145)
T3FREE SERPL-MCNC: 2.8 PG/ML (ref 2.3–4.2)
T4 FREE SERPL-MCNC: 1.7 NG/DL (ref 0.9–1.8)
TIBC SERPL-MCNC: 347 UG/DL (ref 260–445)
TRIGL SERPL-MCNC: 162 MG/DL (ref 0–150)
TSH SERPL DL<=0.005 MIU/L-ACNC: <0.01 UIU/ML (ref 0.27–4.2)
VLDLC SERPL CALC-MCNC: 32 MG/DL
WBC # BLD AUTO: 8 K/UL (ref 4–11)

## 2024-11-28 LAB
EST. AVERAGE GLUCOSE BLD GHB EST-MCNC: 119.8 MG/DL
HBA1C MFR BLD: 5.8 %

## 2024-11-29 LAB
GHRH SERPL-MCNC: 0.47 NG/ML (ref 0.05–8)
IGF-I SERPL-MCNC: 164 NG/ML (ref 19–210)
IGF-I Z-SCORE SERPL: 1.3

## 2024-12-16 ENCOUNTER — TELEPHONE (OUTPATIENT)
Age: 79
End: 2024-12-16

## 2024-12-16 DIAGNOSIS — R30.0 DYSURIA: ICD-10-CM

## 2024-12-16 DIAGNOSIS — N30.00 ACUTE CYSTITIS WITHOUT HEMATURIA: Primary | ICD-10-CM

## 2024-12-16 RX ORDER — CEFUROXIME AXETIL 500 MG/1
500 TABLET ORAL 2 TIMES DAILY
Qty: 10 TABLET | Refills: 0 | Status: SHIPPED | OUTPATIENT
Start: 2024-12-16 | End: 2024-12-21

## 2024-12-16 NOTE — TELEPHONE ENCOUNTER
Onset this am urinary frequency, urgency, dysuria.  No hematuria, flank pain, fever, nausea or vomiting.  Last UTI years ago.   Rx: Ceftin.   Side effects of current medications reviewed and questions answered.     Call or return to clinic prn if these symptoms worsen or fail to improve as anticipated in 48 hours. .

## 2025-02-18 NOTE — PROGRESS NOTES
Hemoglobin A1C   Date Value Ref Range Status   11/27/2024 5.8 See comment % Final     Comment:     Comment:  Diagnosis of Diabetes: > or = 6.5%  Increased risk of diabetes (Prediabetes): 5.7-6.4%  Glycemic Control: Nonpregnant Adults: <7.0%                    Pregnant: <6.0%         Lab Results   Component Value Date    CHOL 177 11/27/2024    CHOL 159 05/08/2023    CHOL 202 (H) 10/11/2021     Lab Results   Component Value Date    TRIG 162 (H) 11/27/2024    TRIG 99 05/08/2023    TRIG 197 (H) 10/11/2021     Lab Results   Component Value Date    HDL 69 (H) 11/27/2024    HDL 57 05/08/2023    HDL 56 10/11/2021     Lab Results   Component Value Date    LDL 76 11/27/2024    LDL 82 05/08/2023     (H) 10/11/2021     Lab Results   Component Value Date    VLDL 32 11/27/2024    VLDL 20 05/08/2023    VLDL 39 10/11/2021     Lab Results   Component Value Date    CHOLHDLRATIO 4.1 12/02/2010     Lab Results   Component Value Date    TSH <0.01 (L) 11/27/2024    [id number] 1.47 08/28/2013    FT3 2.8 11/27/2024    T4FREE 1.7 11/27/2024     Lab Results   Component Value Date    CHOL 177 11/27/2024    CHOL 159 05/08/2023    CHOL 202 (H) 10/11/2021     Lab Results   Component Value Date    TRIG 162 (H) 11/27/2024    TRIG 99 05/08/2023    TRIG 197 (H) 10/11/2021     Lab Results   Component Value Date    HDL 69 (H) 11/27/2024    HDL 57 05/08/2023    HDL 56 10/11/2021     Lab Results   Component Value Date    LDL 76 11/27/2024    LDL 82 05/08/2023     (H) 10/11/2021     Lab Results   Component Value Date    VLDL 32 11/27/2024    VLDL 20 05/08/2023    VLDL 39 10/11/2021     Lab Results   Component Value Date    CHOLHDLRATIO 4.1 12/02/2010        Outpatient Medications Marked as Taking for the 3/3/25 encounter (Office Visit) with Alison Hi MD   Medication Sig Dispense Refill    atorvastatin (LIPITOR) 20 MG tablet Take 1 tablet by mouth daily 90 tablet 2    rivaroxaban (XARELTO) 20 MG TABS tablet Take 1 tablet by mouth

## 2025-03-03 ENCOUNTER — OFFICE VISIT (OUTPATIENT)
Age: 80
End: 2025-03-03

## 2025-03-03 VITALS
HEIGHT: 61 IN | SYSTOLIC BLOOD PRESSURE: 108 MMHG | DIASTOLIC BLOOD PRESSURE: 66 MMHG | BODY MASS INDEX: 28.7 KG/M2 | HEART RATE: 68 BPM | OXYGEN SATURATION: 97 % | WEIGHT: 152 LBS | RESPIRATION RATE: 16 BRPM | TEMPERATURE: 98.1 F

## 2025-03-03 DIAGNOSIS — E22.0 ACROMEGALY AND GIGANTISM (HCC): ICD-10-CM

## 2025-03-03 DIAGNOSIS — I48.0 PAROXYSMAL ATRIAL FIBRILLATION (HCC): ICD-10-CM

## 2025-03-03 DIAGNOSIS — G47.33 OSA (OBSTRUCTIVE SLEEP APNEA): ICD-10-CM

## 2025-03-03 DIAGNOSIS — G30.9 ALZHEIMER DISEASE (HCC): Primary | ICD-10-CM

## 2025-03-03 DIAGNOSIS — E27.49 GLUCOCORTICOID DEFICIENCY: ICD-10-CM

## 2025-03-03 DIAGNOSIS — E78.00 PURE HYPERCHOLESTEROLEMIA: ICD-10-CM

## 2025-03-03 DIAGNOSIS — F02.80 ALZHEIMER DISEASE (HCC): Primary | ICD-10-CM

## 2025-03-03 RX ORDER — ATORVASTATIN CALCIUM 20 MG/1
20 TABLET, FILM COATED ORAL DAILY
Qty: 90 TABLET | Refills: 2 | Status: SHIPPED | OUTPATIENT
Start: 2025-03-03

## 2025-03-03 SDOH — ECONOMIC STABILITY: FOOD INSECURITY: WITHIN THE PAST 12 MONTHS, YOU WORRIED THAT YOUR FOOD WOULD RUN OUT BEFORE YOU GOT MONEY TO BUY MORE.: NEVER TRUE

## 2025-03-03 SDOH — ECONOMIC STABILITY: FOOD INSECURITY: WITHIN THE PAST 12 MONTHS, THE FOOD YOU BOUGHT JUST DIDN'T LAST AND YOU DIDN'T HAVE MONEY TO GET MORE.: NEVER TRUE

## 2025-03-03 ASSESSMENT — PATIENT HEALTH QUESTIONNAIRE - PHQ9: DEPRESSION UNABLE TO ASSESS: FUNCTIONAL CAPACITY MOTIVATION LIMITS ACCURACY

## (undated) DEVICE — GARMENT,MEDLINE,DVT,INT,CALF,MED, GEN2: Brand: MEDLINE

## (undated) DEVICE — DRAPE,LAP,CHOLE,W/TROUGHS,STERILE: Brand: MEDLINE

## (undated) DEVICE — SURGICAL SET UP - SURE SET: Brand: MEDLINE INDUSTRIES, INC.

## (undated) DEVICE — TRAY CATHETER 16FR F INCLUDE BARDX IC COMPLT CARE DRNGE BG

## (undated) DEVICE — TROCAR: Brand: KII OPTICAL ACCESS SYSTEM

## (undated) DEVICE — TROCAR: Brand: KII FIOS FIRST ENTRY

## (undated) DEVICE — PROGRASP FORCEPS: Brand: ENDOWRIST

## (undated) DEVICE — COVER,MAYO STAND,XL,STERILE: Brand: MEDLINE

## (undated) DEVICE — COLUMN DRAPE

## (undated) DEVICE — ADHESIVE SKIN CLSR 0.7ML TOP DERMBND ADV

## (undated) DEVICE — STANDARD HYPODERMIC NEEDLE,POLYPROPYLENE HUB: Brand: MONOJECT

## (undated) DEVICE — CODMAN® SURGICAL PATTIES 1/2" X 3" (1.27CM X 7.62CM): Brand: CODMAN®

## (undated) DEVICE — SYRINGE MED 10ML LUERLOCK TIP W/O SFTY DISP

## (undated) DEVICE — BLADELESS OBTURATOR, LONG: Brand: WECK VISTA

## (undated) DEVICE — MARKER,SKIN,WI/RULER AND LABELS: Brand: MEDLINE

## (undated) DEVICE — NEEDLE FLTR 18GA L1.5IN MEM THK5UM BLNT DISP

## (undated) DEVICE — SYRINGE MED 10ML POLYPR LUERSLIP TIP FLAT TOP W/O SFTY DISP

## (undated) DEVICE — TOWEL,OR,DSP,ST,BLUE,DLX,1/PK,80PK/CS: Brand: MEDLINE

## (undated) DEVICE — TIP COVER ACCESSORY

## (undated) DEVICE — CANNULA SEAL

## (undated) DEVICE — GLOVE ORANGE PI 7   MSG9070

## (undated) DEVICE — SUTURE VCRL SZ 0 L54IN ABSRB VLT W/O NDL POLYGLACTIN 910 J616H

## (undated) DEVICE — SPONGE,LAP,4"X18",XR,ST,5/PK,40PK/CS: Brand: MEDLINE INDUSTRIES, INC.

## (undated) DEVICE — ELECTROSURGICAL PENCIL ROCKER SWITCH NON COATED BLADE ELECTRODE 10 FT (3 M) CORD HOLSTER: Brand: MEGADYNE

## (undated) DEVICE — Device

## (undated) DEVICE — JEWISH HOSPITAL TURNOVER KIT: Brand: MEDLINE INDUSTRIES, INC.

## (undated) DEVICE — SOLUTION IV 1000ML 0.9% SOD CHL

## (undated) DEVICE — APPLICATOR MEDICATED 26 CC SOLUTION HI LT ORNG CHLORAPREP

## (undated) DEVICE — GOWN,SIRUS,POLYRNF,BRTHSLV,XL,30/CS: Brand: MEDLINE

## (undated) DEVICE — SYSTEM SMK EVAC LAP TBNG FILTER HSNG BENT STYL PNK SEE CLR

## (undated) DEVICE — 40583 XL ADVANCED TRENDELENBURG POSITIONING KIT: Brand: 40583 XL ADVANCED TRENDELENBURG POSITIONING KIT

## (undated) DEVICE — GOWN,SIRUS,POLYRNF,BRTHSLV,LG,30/CS: Brand: MEDLINE

## (undated) DEVICE — NEEDLE INSUF L120MM DIA2MM DISP FOR PNEUMOPERI ENDOPATH

## (undated) DEVICE — 3M™ IOBAN™ 2 ANTIMICROBIAL INCISE DRAPE 6648EZ: Brand: IOBAN™ 2

## (undated) DEVICE — LARGE NEEDLE DRIVER: Brand: ENDOWRIST

## (undated) DEVICE — SUTURE 1 STRATAFIX SYMMETRIC PDS + 15CM CT-1 SXPP1A420

## (undated) DEVICE — PLATE ES AD W 9FT CRD 2

## (undated) DEVICE — SUTURE ABSORBABLE BRAIDED 2-0 CT-1 27 IN UD VICRYL J259H

## (undated) DEVICE — [HIGH FLOW INSUFFLATOR,  DO NOT USE IF PACKAGE IS DAMAGED,  KEEP DRY,  KEEP AWAY FROM SUNLIGHT,  PROTECT FROM HEAT AND RADIOACTIVE SOURCES.]: Brand: PNEUMOSURE

## (undated) DEVICE — SOLUTION ANTIFOG VIS SYS CLEARIFY LAPSCP

## (undated) DEVICE — CONTAINER,SPECIMEN,OR STERILE,4OZ: Brand: MEDLINE

## (undated) DEVICE — BINDER ABD UNIV H9IN WAIST 45-62IN E SFT COT PREM 3 PNL

## (undated) DEVICE — SUTURE ABSRB L30CM 2-0 VLT SPRL PDS + STRATAFIX SXPP1B410

## (undated) DEVICE — SCISSORS SURG DIA8MM MPLR CRV ENDOWRIST

## (undated) DEVICE — SUTURE VCRL SZ 4-0 L18IN ABSRB UD L19MM PS-2 3/8 CIR PRIM J496H

## (undated) DEVICE — LAPAROSCOPIC SCISSORS: Brand: EPIX LAPAROSCOPIC SCISSORS

## (undated) DEVICE — GLOVE SURG SZ 75 L12IN THK75MIL DK GRN LTX FREE

## (undated) DEVICE — ARM DRAPE

## (undated) DEVICE — BLANKET WRM W29.9XL79.1IN UP BODY FORC AIR MISTRAL-AIR

## (undated) DEVICE — SURE SET-DOUBLE BASIN-LF: Brand: MEDLINE INDUSTRIES, INC.